# Patient Record
Sex: FEMALE | Race: WHITE | Employment: OTHER | URBAN - METROPOLITAN AREA
[De-identification: names, ages, dates, MRNs, and addresses within clinical notes are randomized per-mention and may not be internally consistent; named-entity substitution may affect disease eponyms.]

---

## 2018-10-09 ENCOUNTER — HOSPITAL ENCOUNTER (OUTPATIENT)
Dept: OPERATING ROOM | Age: 83
Setting detail: OUTPATIENT SURGERY
Discharge: HOME | End: 2018-10-09
Payer: MEDICARE

## 2018-10-09 ENCOUNTER — HOSPITAL ENCOUNTER (OUTPATIENT)
Dept: CT IMAGING | Age: 83
Discharge: HOME | End: 2018-10-09
Payer: MEDICARE

## 2018-10-09 VITALS
SYSTOLIC BLOOD PRESSURE: 195 MMHG | BODY MASS INDEX: 23.05 KG/M2 | WEIGHT: 135 LBS | DIASTOLIC BLOOD PRESSURE: 84 MMHG | HEIGHT: 64 IN | RESPIRATION RATE: 16 BRPM | HEART RATE: 60 BPM | OXYGEN SATURATION: 100 %

## 2018-10-09 DIAGNOSIS — F06.30 MOOD DISORDER DUE TO KNOWN PHYSIOLOGICAL CONDITION: ICD-10-CM

## 2018-10-09 PROCEDURE — 66821 AFTER CATARACT LASER SURGERY: CPT

## 2018-10-09 PROCEDURE — 70491 CT SOFT TISSUE NECK W/DYE: CPT

## 2018-10-09 PROCEDURE — 6360000004 HC RX CONTRAST MEDICATION: Performed by: FAMILY MEDICINE

## 2018-10-09 PROCEDURE — 70460 CT HEAD/BRAIN W/DYE: CPT

## 2018-10-09 PROCEDURE — 6370000000 HC RX 637 (ALT 250 FOR IP): Performed by: OPHTHALMOLOGY

## 2018-10-09 RX ORDER — ARIPIPRAZOLE 5 MG/1
TABLET ORAL
Status: ON HOLD | COMMUNITY
Start: 2018-07-25 | End: 2019-01-12 | Stop reason: HOSPADM

## 2018-10-09 RX ORDER — FERROUS SULFATE 325(65) MG
325 TABLET ORAL
COMMUNITY

## 2018-10-09 RX ORDER — PROPARACAINE HYDROCHLORIDE 5 MG/ML
1 SOLUTION/ DROPS OPHTHALMIC
Status: ACTIVE | OUTPATIENT
Start: 2018-10-09 | End: 2018-10-09

## 2018-10-09 RX ORDER — TROPICAMIDE 10 MG/ML
1 SOLUTION/ DROPS OPHTHALMIC
Status: COMPLETED | OUTPATIENT
Start: 2018-10-09 | End: 2018-10-09

## 2018-10-09 RX ORDER — DOCUSATE SODIUM 100 MG/1
100 CAPSULE, LIQUID FILLED ORAL DAILY
COMMUNITY

## 2018-10-09 RX ORDER — DIVALPROEX SODIUM 125 MG/1
125 CAPSULE, COATED PELLETS ORAL DAILY
Status: ON HOLD | COMMUNITY
End: 2019-01-12 | Stop reason: HOSPADM

## 2018-10-09 RX ORDER — CYANOCOBALAMIN (VITAMIN B-12) 1000 MCG
1 TABLET, EXTENDED RELEASE ORAL
COMMUNITY

## 2018-10-09 RX ORDER — POLYETHYLENE GLYCOL 3350 17 G
2 POWDER IN PACKET (EA) ORAL PRN
COMMUNITY

## 2018-10-09 RX ORDER — DESVENLAFAXINE 100 MG/1
TABLET, EXTENDED RELEASE ORAL EVERY OTHER DAY
COMMUNITY

## 2018-10-09 RX ORDER — LACTULOSE 10 G/15ML
20 SOLUTION ORAL DAILY
COMMUNITY

## 2018-10-09 RX ORDER — LANOLIN ALCOHOL/MO/W.PET/CERES
3 CREAM (GRAM) TOPICAL DAILY
COMMUNITY

## 2018-10-09 RX ORDER — FLUTICASONE PROPIONATE 50 MCG
1 SPRAY, SUSPENSION (ML) NASAL DAILY PRN
COMMUNITY

## 2018-10-09 RX ORDER — ROSUVASTATIN CALCIUM 5 MG/1
5 TABLET, COATED ORAL NIGHTLY
COMMUNITY
End: 2019-07-09

## 2018-10-09 RX ORDER — POLYETHYLENE GLYCOL 3350 17 G/17G
17 POWDER, FOR SOLUTION ORAL DAILY PRN
COMMUNITY

## 2018-10-09 RX ORDER — IBUPROFEN 400 MG/1
400 TABLET ORAL EVERY 4 HOURS PRN
Status: ON HOLD | COMMUNITY
End: 2019-09-16 | Stop reason: HOSPADM

## 2018-10-09 RX ADMIN — TROPICAMIDE 1 DROP: 10 SOLUTION/ DROPS OPHTHALMIC at 06:32

## 2018-10-09 RX ADMIN — IOPAMIDOL 80 ML: 755 INJECTION, SOLUTION INTRAVENOUS at 16:03

## 2018-11-30 ENCOUNTER — HOSPITAL ENCOUNTER (EMERGENCY)
Age: 83
Discharge: HOME | End: 2018-11-30
Attending: EMERGENCY MEDICINE
Payer: MEDICARE

## 2018-11-30 VITALS
HEIGHT: 64 IN | BODY MASS INDEX: 23.05 KG/M2 | RESPIRATION RATE: 21 BRPM | TEMPERATURE: 99.4 F | HEART RATE: 63 BPM | WEIGHT: 135 LBS | OXYGEN SATURATION: 97 % | DIASTOLIC BLOOD PRESSURE: 71 MMHG | SYSTOLIC BLOOD PRESSURE: 189 MMHG

## 2018-11-30 DIAGNOSIS — I10 HYPERTENSION, UNSPECIFIED TYPE: Primary | ICD-10-CM

## 2018-11-30 LAB
A/G RATIO: 1.2 (ref 1.1–2.2)
ALBUMIN SERPL-MCNC: 3.6 G/DL (ref 3.4–5)
ALP BLD-CCNC: 57 U/L (ref 40–129)
ALT SERPL-CCNC: 9 U/L (ref 10–40)
ANION GAP SERPL CALCULATED.3IONS-SCNC: 10 MMOL/L (ref 3–16)
AST SERPL-CCNC: 13 U/L (ref 15–37)
BACTERIA: ABNORMAL /HPF
BASOPHILS ABSOLUTE: 0 K/UL (ref 0–0.2)
BASOPHILS RELATIVE PERCENT: 0.3 %
BILIRUB SERPL-MCNC: <0.2 MG/DL (ref 0–1)
BILIRUBIN URINE: NEGATIVE
BLOOD, URINE: ABNORMAL
BUN BLDV-MCNC: 21 MG/DL (ref 7–20)
CALCIUM SERPL-MCNC: 9.2 MG/DL (ref 8.3–10.6)
CHLORIDE BLD-SCNC: 100 MMOL/L (ref 99–110)
CLARITY: CLEAR
CO2: 27 MMOL/L (ref 21–32)
COLOR: YELLOW
CREAT SERPL-MCNC: 0.7 MG/DL (ref 0.6–1.2)
EOSINOPHILS ABSOLUTE: 0.2 K/UL (ref 0–0.6)
EOSINOPHILS RELATIVE PERCENT: 2 %
EPITHELIAL CELLS, UA: ABNORMAL /HPF
GFR AFRICAN AMERICAN: >60
GFR NON-AFRICAN AMERICAN: >60
GLOBULIN: 3 G/DL
GLUCOSE BLD-MCNC: 136 MG/DL (ref 70–99)
GLUCOSE BLD-MCNC: 142 MG/DL (ref 70–99)
GLUCOSE URINE: NEGATIVE MG/DL
HCT VFR BLD CALC: 36.1 % (ref 36–48)
HEMOGLOBIN: 12 G/DL (ref 12–16)
KETONES, URINE: NEGATIVE MG/DL
LEUKOCYTE ESTERASE, URINE: ABNORMAL
LYMPHOCYTES ABSOLUTE: 2 K/UL (ref 1–5.1)
LYMPHOCYTES RELATIVE PERCENT: 23.7 %
MCH RBC QN AUTO: 32.5 PG (ref 26–34)
MCHC RBC AUTO-ENTMCNC: 33.3 G/DL (ref 31–36)
MCV RBC AUTO: 97.4 FL (ref 80–100)
MICROSCOPIC EXAMINATION: YES
MONOCYTES ABSOLUTE: 0.6 K/UL (ref 0–1.3)
MONOCYTES RELATIVE PERCENT: 7.7 %
MUCUS: ABNORMAL /LPF
NEUTROPHILS ABSOLUTE: 5.5 K/UL (ref 1.7–7.7)
NEUTROPHILS RELATIVE PERCENT: 66.3 %
NITRITE, URINE: NEGATIVE
PDW BLD-RTO: 13 % (ref 12.4–15.4)
PERFORMED ON: ABNORMAL
PH UA: 6
PLATELET # BLD: 217 K/UL (ref 135–450)
PMV BLD AUTO: 9.1 FL (ref 5–10.5)
POTASSIUM SERPL-SCNC: 4.3 MMOL/L (ref 3.5–5.1)
PROTEIN UA: NEGATIVE MG/DL
RBC # BLD: 3.7 M/UL (ref 4–5.2)
RBC UA: ABNORMAL /HPF (ref 0–2)
RENAL EPITHELIAL, UA: ABNORMAL /HPF
SODIUM BLD-SCNC: 137 MMOL/L (ref 136–145)
SPECIFIC GRAVITY UA: 1.01
TOTAL PROTEIN: 6.6 G/DL (ref 6.4–8.2)
TROPONIN: <0.01 NG/ML
URINE TYPE: ABNORMAL
UROBILINOGEN, URINE: 0.2 E.U./DL
WBC # BLD: 8.4 K/UL (ref 4–11)
WBC UA: ABNORMAL /HPF (ref 0–5)

## 2018-11-30 PROCEDURE — 81001 URINALYSIS AUTO W/SCOPE: CPT

## 2018-11-30 PROCEDURE — 99285 EMERGENCY DEPT VISIT HI MDM: CPT

## 2018-11-30 PROCEDURE — 93005 ELECTROCARDIOGRAM TRACING: CPT | Performed by: EMERGENCY MEDICINE

## 2018-11-30 PROCEDURE — 85025 COMPLETE CBC W/AUTO DIFF WBC: CPT

## 2018-11-30 PROCEDURE — 84484 ASSAY OF TROPONIN QUANT: CPT

## 2018-11-30 PROCEDURE — 80053 COMPREHEN METABOLIC PANEL: CPT

## 2018-12-01 LAB
EKG ATRIAL RATE: 63 BPM
EKG DIAGNOSIS: NORMAL
EKG P AXIS: 84 DEGREES
EKG P-R INTERVAL: 174 MS
EKG Q-T INTERVAL: 420 MS
EKG QRS DURATION: 78 MS
EKG QTC CALCULATION (BAZETT): 429 MS
EKG R AXIS: 22 DEGREES
EKG T AXIS: 54 DEGREES
EKG VENTRICULAR RATE: 63 BPM

## 2018-12-01 PROCEDURE — 93010 ELECTROCARDIOGRAM REPORT: CPT | Performed by: INTERNAL MEDICINE

## 2019-01-08 ENCOUNTER — APPOINTMENT (OUTPATIENT)
Dept: GENERAL RADIOLOGY | Age: 84
DRG: 871 | End: 2019-01-08
Payer: MEDICARE

## 2019-01-08 ENCOUNTER — HOSPITAL ENCOUNTER (INPATIENT)
Age: 84
LOS: 4 days | Discharge: NURSING, REHAB OR LONG-TERM CARE | DRG: 871 | End: 2019-01-12
Attending: EMERGENCY MEDICINE | Admitting: INTERNAL MEDICINE
Payer: MEDICARE

## 2019-01-08 DIAGNOSIS — R06.02 SHORTNESS OF BREATH: Primary | ICD-10-CM

## 2019-01-08 DIAGNOSIS — R09.02 HYPOXIA: ICD-10-CM

## 2019-01-08 DIAGNOSIS — J18.9 PNEUMONIA DUE TO ORGANISM: ICD-10-CM

## 2019-01-08 PROBLEM — R65.20 SEVERE SEPSIS (HCC): Status: ACTIVE | Noted: 2019-01-08

## 2019-01-08 PROBLEM — F32.A DEPRESSION: Status: ACTIVE | Noted: 2019-01-08

## 2019-01-08 PROBLEM — A41.9 SEVERE SEPSIS (HCC): Status: ACTIVE | Noted: 2019-01-08

## 2019-01-08 PROBLEM — E11.9 DMII (DIABETES MELLITUS, TYPE 2) (HCC): Status: ACTIVE | Noted: 2019-01-08

## 2019-01-08 LAB
A/G RATIO: 1.1 (ref 1.1–2.2)
ALBUMIN SERPL-MCNC: 3.9 G/DL (ref 3.4–5)
ALP BLD-CCNC: 63 U/L (ref 40–129)
ALT SERPL-CCNC: 9 U/L (ref 10–40)
ANION GAP SERPL CALCULATED.3IONS-SCNC: 19 MMOL/L (ref 3–16)
APTT: 27.6 SEC (ref 26–36)
AST SERPL-CCNC: 17 U/L (ref 15–37)
BASE EXCESS VENOUS: -1.9 MMOL/L (ref -3–3)
BASOPHILS ABSOLUTE: 0 K/UL (ref 0–0.2)
BASOPHILS RELATIVE PERCENT: 0.3 %
BILIRUB SERPL-MCNC: <0.2 MG/DL (ref 0–1)
BUN BLDV-MCNC: 34 MG/DL (ref 7–20)
CALCIUM SERPL-MCNC: 9.4 MG/DL (ref 8.3–10.6)
CARBOXYHEMOGLOBIN: 1.1 % (ref 0–1.5)
CHLORIDE BLD-SCNC: 93 MMOL/L (ref 99–110)
CO2: 24 MMOL/L (ref 21–32)
CREAT SERPL-MCNC: 1.2 MG/DL (ref 0.6–1.2)
EKG ATRIAL RATE: 87 BPM
EKG DIAGNOSIS: NORMAL
EKG Q-T INTERVAL: 382 MS
EKG QRS DURATION: 76 MS
EKG QTC CALCULATION (BAZETT): 462 MS
EKG R AXIS: 12 DEGREES
EKG T AXIS: 56 DEGREES
EKG VENTRICULAR RATE: 88 BPM
EOSINOPHILS ABSOLUTE: 0 K/UL (ref 0–0.6)
EOSINOPHILS RELATIVE PERCENT: 0 %
GFR AFRICAN AMERICAN: 51
GFR NON-AFRICAN AMERICAN: 42
GLOBULIN: 3.6 G/DL
GLUCOSE BLD-MCNC: 181 MG/DL (ref 70–99)
GLUCOSE BLD-MCNC: 207 MG/DL (ref 70–99)
GLUCOSE BLD-MCNC: 213 MG/DL (ref 70–99)
HCO3 VENOUS: 24.2 MMOL/L (ref 23–29)
HCT VFR BLD CALC: 35.3 % (ref 36–48)
HEMOGLOBIN: 11.9 G/DL (ref 12–16)
INR BLD: 1.04 (ref 0.86–1.14)
LACTIC ACID: 2.8 MMOL/L (ref 0.4–2)
LACTIC ACID: 4.6 MMOL/L (ref 0.4–2)
LYMPHOCYTES ABSOLUTE: 0.4 K/UL (ref 1–5.1)
LYMPHOCYTES RELATIVE PERCENT: 3.6 %
MCH RBC QN AUTO: 32.7 PG (ref 26–34)
MCHC RBC AUTO-ENTMCNC: 33.6 G/DL (ref 31–36)
MCV RBC AUTO: 97.4 FL (ref 80–100)
METHEMOGLOBIN VENOUS: 0.6 %
MONOCYTES ABSOLUTE: 0.4 K/UL (ref 0–1.3)
MONOCYTES RELATIVE PERCENT: 3.6 %
NEUTROPHILS ABSOLUTE: 10.8 K/UL (ref 1.7–7.7)
NEUTROPHILS RELATIVE PERCENT: 92.5 %
O2 CONTENT, VEN: 13 VOL %
O2 SAT, VEN: 72 %
O2 THERAPY: ABNORMAL
PCO2, VEN: 46.7 MMHG (ref 40–50)
PDW BLD-RTO: 13 % (ref 12.4–15.4)
PERFORMED ON: ABNORMAL
PERFORMED ON: ABNORMAL
PH VENOUS: 7.33 (ref 7.35–7.45)
PLATELET # BLD: 240 K/UL (ref 135–450)
PMV BLD AUTO: 8.8 FL (ref 5–10.5)
PO2, VEN: 41.8 MMHG (ref 25–40)
POTASSIUM SERPL-SCNC: 4.4 MMOL/L (ref 3.5–5.1)
PROTHROMBIN TIME: 11.8 SEC (ref 9.8–13)
RAPID INFLUENZA  B AGN: NEGATIVE
RAPID INFLUENZA A AGN: NEGATIVE
RBC # BLD: 3.63 M/UL (ref 4–5.2)
SODIUM BLD-SCNC: 136 MMOL/L (ref 136–145)
TCO2 CALC VENOUS: 26 MMOL/L
TOTAL PROTEIN: 7.5 G/DL (ref 6.4–8.2)
WBC # BLD: 11.7 K/UL (ref 4–11)

## 2019-01-08 PROCEDURE — 6370000000 HC RX 637 (ALT 250 FOR IP): Performed by: INTERNAL MEDICINE

## 2019-01-08 PROCEDURE — 36415 COLL VENOUS BLD VENIPUNCTURE: CPT

## 2019-01-08 PROCEDURE — 71045 X-RAY EXAM CHEST 1 VIEW: CPT

## 2019-01-08 PROCEDURE — 99285 EMERGENCY DEPT VISIT HI MDM: CPT

## 2019-01-08 PROCEDURE — 6370000000 HC RX 637 (ALT 250 FOR IP): Performed by: EMERGENCY MEDICINE

## 2019-01-08 PROCEDURE — 87040 BLOOD CULTURE FOR BACTERIA: CPT

## 2019-01-08 PROCEDURE — 83605 ASSAY OF LACTIC ACID: CPT

## 2019-01-08 PROCEDURE — 80053 COMPREHEN METABOLIC PANEL: CPT

## 2019-01-08 PROCEDURE — 96368 THER/DIAG CONCURRENT INF: CPT

## 2019-01-08 PROCEDURE — 2580000003 HC RX 258: Performed by: EMERGENCY MEDICINE

## 2019-01-08 PROCEDURE — 87186 SC STD MICRODIL/AGAR DIL: CPT

## 2019-01-08 PROCEDURE — 94664 DEMO&/EVAL PT USE INHALER: CPT

## 2019-01-08 PROCEDURE — 87077 CULTURE AEROBIC IDENTIFY: CPT

## 2019-01-08 PROCEDURE — 87804 INFLUENZA ASSAY W/OPTIC: CPT

## 2019-01-08 PROCEDURE — 82803 BLOOD GASES ANY COMBINATION: CPT

## 2019-01-08 PROCEDURE — 6360000002 HC RX W HCPCS: Performed by: EMERGENCY MEDICINE

## 2019-01-08 PROCEDURE — 2700000000 HC OXYGEN THERAPY PER DAY

## 2019-01-08 PROCEDURE — 94761 N-INVAS EAR/PLS OXIMETRY MLT: CPT

## 2019-01-08 PROCEDURE — 1200000000 HC SEMI PRIVATE

## 2019-01-08 PROCEDURE — 6360000002 HC RX W HCPCS: Performed by: INTERNAL MEDICINE

## 2019-01-08 PROCEDURE — 2580000003 HC RX 258: Performed by: INTERNAL MEDICINE

## 2019-01-08 PROCEDURE — 96365 THER/PROPH/DIAG IV INF INIT: CPT

## 2019-01-08 PROCEDURE — 85730 THROMBOPLASTIN TIME PARTIAL: CPT

## 2019-01-08 PROCEDURE — 85610 PROTHROMBIN TIME: CPT

## 2019-01-08 PROCEDURE — 93005 ELECTROCARDIOGRAM TRACING: CPT | Performed by: EMERGENCY MEDICINE

## 2019-01-08 PROCEDURE — 93010 ELECTROCARDIOGRAM REPORT: CPT | Performed by: INTERNAL MEDICINE

## 2019-01-08 PROCEDURE — 94640 AIRWAY INHALATION TREATMENT: CPT

## 2019-01-08 PROCEDURE — 87086 URINE CULTURE/COLONY COUNT: CPT

## 2019-01-08 PROCEDURE — 96366 THER/PROPH/DIAG IV INF ADDON: CPT

## 2019-01-08 PROCEDURE — 85025 COMPLETE CBC W/AUTO DIFF WBC: CPT

## 2019-01-08 RX ORDER — DESVENLAFAXINE 50 MG/1
100 TABLET, EXTENDED RELEASE ORAL EVERY OTHER DAY
Status: DISCONTINUED | OUTPATIENT
Start: 2019-01-09 | End: 2019-01-12 | Stop reason: HOSPADM

## 2019-01-08 RX ORDER — FERROUS SULFATE 325(65) MG
325 TABLET ORAL
Status: DISCONTINUED | OUTPATIENT
Start: 2019-01-09 | End: 2019-01-12 | Stop reason: HOSPADM

## 2019-01-08 RX ORDER — POLYETHYLENE GLYCOL 3350 17 G/17G
17 POWDER, FOR SOLUTION ORAL DAILY PRN
Status: DISCONTINUED | OUTPATIENT
Start: 2019-01-08 | End: 2019-01-12 | Stop reason: HOSPADM

## 2019-01-08 RX ORDER — ASPIRIN 81 MG/1
81 TABLET, CHEWABLE ORAL DAILY
Status: DISCONTINUED | OUTPATIENT
Start: 2019-01-09 | End: 2019-01-12 | Stop reason: HOSPADM

## 2019-01-08 RX ORDER — SODIUM CHLORIDE 9 MG/ML
INJECTION, SOLUTION INTRAVENOUS CONTINUOUS
Status: DISCONTINUED | OUTPATIENT
Start: 2019-01-08 | End: 2019-01-10

## 2019-01-08 RX ORDER — UREA 10 %
3 LOTION (ML) TOPICAL NIGHTLY
Status: DISCONTINUED | OUTPATIENT
Start: 2019-01-08 | End: 2019-01-12 | Stop reason: HOSPADM

## 2019-01-08 RX ORDER — IPRATROPIUM BROMIDE AND ALBUTEROL SULFATE 2.5; .5 MG/3ML; MG/3ML
1 SOLUTION RESPIRATORY (INHALATION) ONCE
Status: COMPLETED | OUTPATIENT
Start: 2019-01-08 | End: 2019-01-08

## 2019-01-08 RX ORDER — SODIUM CHLORIDE 0.9 % (FLUSH) 0.9 %
10 SYRINGE (ML) INJECTION PRN
Status: DISCONTINUED | OUTPATIENT
Start: 2019-01-08 | End: 2019-01-12 | Stop reason: HOSPADM

## 2019-01-08 RX ORDER — POLYETHYLENE GLYCOL 3350 17 G/17G
17 POWDER, FOR SOLUTION ORAL DAILY PRN
Status: DISCONTINUED | OUTPATIENT
Start: 2019-01-08 | End: 2019-01-08 | Stop reason: CLARIF

## 2019-01-08 RX ORDER — LACTULOSE 10 G/15ML
20 SOLUTION ORAL DAILY
Status: DISCONTINUED | OUTPATIENT
Start: 2019-01-08 | End: 2019-01-12 | Stop reason: HOSPADM

## 2019-01-08 RX ORDER — DOCUSATE SODIUM 100 MG/1
100 CAPSULE, LIQUID FILLED ORAL DAILY
Status: DISCONTINUED | OUTPATIENT
Start: 2019-01-09 | End: 2019-01-12 | Stop reason: HOSPADM

## 2019-01-08 RX ORDER — MAGNESIUM SULFATE IN WATER 40 MG/ML
2 INJECTION, SOLUTION INTRAVENOUS ONCE
Status: COMPLETED | OUTPATIENT
Start: 2019-01-08 | End: 2019-01-08

## 2019-01-08 RX ORDER — SODIUM CHLORIDE 0.9 % (FLUSH) 0.9 %
10 SYRINGE (ML) INJECTION EVERY 12 HOURS SCHEDULED
Status: DISCONTINUED | OUTPATIENT
Start: 2019-01-08 | End: 2019-01-12 | Stop reason: HOSPADM

## 2019-01-08 RX ORDER — 0.9 % SODIUM CHLORIDE 0.9 %
1000 INTRAVENOUS SOLUTION INTRAVENOUS ONCE
Status: COMPLETED | OUTPATIENT
Start: 2019-01-08 | End: 2019-01-08

## 2019-01-08 RX ORDER — IPRATROPIUM BROMIDE AND ALBUTEROL SULFATE 2.5; .5 MG/3ML; MG/3ML
1 SOLUTION RESPIRATORY (INHALATION)
Status: DISCONTINUED | OUTPATIENT
Start: 2019-01-08 | End: 2019-01-10

## 2019-01-08 RX ORDER — ONDANSETRON 2 MG/ML
4 INJECTION INTRAMUSCULAR; INTRAVENOUS EVERY 6 HOURS PRN
Status: DISCONTINUED | OUTPATIENT
Start: 2019-01-08 | End: 2019-01-12 | Stop reason: HOSPADM

## 2019-01-08 RX ORDER — NICOTINE POLACRILEX 4 MG
15 LOZENGE BUCCAL PRN
Status: DISCONTINUED | OUTPATIENT
Start: 2019-01-08 | End: 2019-01-12 | Stop reason: HOSPADM

## 2019-01-08 RX ORDER — ROSUVASTATIN CALCIUM 10 MG/1
5 TABLET, COATED ORAL NIGHTLY
Status: DISCONTINUED | OUTPATIENT
Start: 2019-01-09 | End: 2019-01-12 | Stop reason: HOSPADM

## 2019-01-08 RX ORDER — DEXTROSE MONOHYDRATE 25 G/50ML
12.5 INJECTION, SOLUTION INTRAVENOUS PRN
Status: DISCONTINUED | OUTPATIENT
Start: 2019-01-08 | End: 2019-01-12 | Stop reason: HOSPADM

## 2019-01-08 RX ORDER — PREDNISONE 20 MG/1
60 TABLET ORAL ONCE
Status: COMPLETED | OUTPATIENT
Start: 2019-01-08 | End: 2019-01-08

## 2019-01-08 RX ORDER — DEXTROSE MONOHYDRATE 50 MG/ML
100 INJECTION, SOLUTION INTRAVENOUS PRN
Status: DISCONTINUED | OUTPATIENT
Start: 2019-01-08 | End: 2019-01-12 | Stop reason: HOSPADM

## 2019-01-08 RX ADMIN — Medication 10 ML: at 20:57

## 2019-01-08 RX ADMIN — IPRATROPIUM BROMIDE AND ALBUTEROL SULFATE 1 AMPULE: .5; 3 SOLUTION RESPIRATORY (INHALATION) at 16:58

## 2019-01-08 RX ADMIN — INSULIN LISPRO 1 UNITS: 100 INJECTION, SOLUTION INTRAVENOUS; SUBCUTANEOUS at 20:45

## 2019-01-08 RX ADMIN — INSULIN LISPRO 2 UNITS: 100 INJECTION, SOLUTION INTRAVENOUS; SUBCUTANEOUS at 18:06

## 2019-01-08 RX ADMIN — Medication 3 MG: at 20:57

## 2019-01-08 RX ADMIN — AZITHROMYCIN MONOHYDRATE 500 MG: 500 INJECTION, POWDER, LYOPHILIZED, FOR SOLUTION INTRAVENOUS at 20:10

## 2019-01-08 RX ADMIN — LACTULOSE 20 G: 20 SOLUTION ORAL at 18:01

## 2019-01-08 RX ADMIN — SODIUM CHLORIDE: 9 INJECTION, SOLUTION INTRAVENOUS at 18:01

## 2019-01-08 RX ADMIN — CEFTRIAXONE SODIUM 1 G: 1 INJECTION, POWDER, FOR SOLUTION INTRAMUSCULAR; INTRAVENOUS at 14:55

## 2019-01-08 RX ADMIN — PREDNISONE 60 MG: 20 TABLET ORAL at 13:34

## 2019-01-08 RX ADMIN — SODIUM CHLORIDE 1000 ML: 9 INJECTION, SOLUTION INTRAVENOUS at 13:34

## 2019-01-08 RX ADMIN — IPRATROPIUM BROMIDE AND ALBUTEROL SULFATE 1 AMPULE: .5; 3 SOLUTION RESPIRATORY (INHALATION) at 12:29

## 2019-01-08 RX ADMIN — MAGNESIUM SULFATE HEPTAHYDRATE 2 G: 40 INJECTION, SOLUTION INTRAVENOUS at 13:34

## 2019-01-08 RX ADMIN — IPRATROPIUM BROMIDE AND ALBUTEROL SULFATE 1 AMPULE: .5; 3 SOLUTION RESPIRATORY (INHALATION) at 19:45

## 2019-01-08 ASSESSMENT — PAIN SCALES - GENERAL
PAINLEVEL_OUTOF10: 0

## 2019-01-09 PROBLEM — J96.01 ACUTE RESPIRATORY FAILURE WITH HYPOXIA (HCC): Status: ACTIVE | Noted: 2019-01-09

## 2019-01-09 LAB
ANION GAP SERPL CALCULATED.3IONS-SCNC: 11 MMOL/L (ref 3–16)
BUN BLDV-MCNC: 21 MG/DL (ref 7–20)
CALCIUM SERPL-MCNC: 8.6 MG/DL (ref 8.3–10.6)
CHLORIDE BLD-SCNC: 106 MMOL/L (ref 99–110)
CO2: 23 MMOL/L (ref 21–32)
CREAT SERPL-MCNC: 0.6 MG/DL (ref 0.6–1.2)
GFR AFRICAN AMERICAN: >60
GFR NON-AFRICAN AMERICAN: >60
GLUCOSE BLD-MCNC: 107 MG/DL (ref 70–99)
GLUCOSE BLD-MCNC: 130 MG/DL (ref 70–99)
GLUCOSE BLD-MCNC: 135 MG/DL (ref 70–99)
GLUCOSE BLD-MCNC: 152 MG/DL (ref 70–99)
GLUCOSE BLD-MCNC: 95 MG/DL (ref 70–99)
HCT VFR BLD CALC: 32.7 % (ref 36–48)
HEMOGLOBIN: 11 G/DL (ref 12–16)
LACTIC ACID: 2.5 MMOL/L (ref 0.4–2)
MCH RBC QN AUTO: 32.8 PG (ref 26–34)
MCHC RBC AUTO-ENTMCNC: 33.6 G/DL (ref 31–36)
MCV RBC AUTO: 97.7 FL (ref 80–100)
PDW BLD-RTO: 13.2 % (ref 12.4–15.4)
PERFORMED ON: ABNORMAL
PLATELET # BLD: 246 K/UL (ref 135–450)
PMV BLD AUTO: 8.8 FL (ref 5–10.5)
POTASSIUM REFLEX MAGNESIUM: 4 MMOL/L (ref 3.5–5.1)
RBC # BLD: 3.35 M/UL (ref 4–5.2)
SODIUM BLD-SCNC: 140 MMOL/L (ref 136–145)
WBC # BLD: 8.1 K/UL (ref 4–11)

## 2019-01-09 PROCEDURE — 6370000000 HC RX 637 (ALT 250 FOR IP): Performed by: INTERNAL MEDICINE

## 2019-01-09 PROCEDURE — 6360000002 HC RX W HCPCS: Performed by: INTERNAL MEDICINE

## 2019-01-09 PROCEDURE — 36415 COLL VENOUS BLD VENIPUNCTURE: CPT

## 2019-01-09 PROCEDURE — 2580000003 HC RX 258: Performed by: INTERNAL MEDICINE

## 2019-01-09 PROCEDURE — 97116 GAIT TRAINING THERAPY: CPT

## 2019-01-09 PROCEDURE — 94761 N-INVAS EAR/PLS OXIMETRY MLT: CPT

## 2019-01-09 PROCEDURE — 2700000000 HC OXYGEN THERAPY PER DAY

## 2019-01-09 PROCEDURE — 97162 PT EVAL MOD COMPLEX 30 MIN: CPT

## 2019-01-09 PROCEDURE — 85027 COMPLETE CBC AUTOMATED: CPT

## 2019-01-09 PROCEDURE — 80048 BASIC METABOLIC PNL TOTAL CA: CPT

## 2019-01-09 PROCEDURE — 97166 OT EVAL MOD COMPLEX 45 MIN: CPT

## 2019-01-09 PROCEDURE — 1200000000 HC SEMI PRIVATE

## 2019-01-09 PROCEDURE — 92610 EVALUATE SWALLOWING FUNCTION: CPT

## 2019-01-09 PROCEDURE — 83605 ASSAY OF LACTIC ACID: CPT

## 2019-01-09 PROCEDURE — 94640 AIRWAY INHALATION TREATMENT: CPT

## 2019-01-09 PROCEDURE — 97535 SELF CARE MNGMENT TRAINING: CPT

## 2019-01-09 RX ORDER — AMLODIPINE BESYLATE 5 MG/1
5 TABLET ORAL DAILY
Status: DISCONTINUED | OUTPATIENT
Start: 2019-01-09 | End: 2019-01-10

## 2019-01-09 RX ORDER — HYDRALAZINE HYDROCHLORIDE 20 MG/ML
5 INJECTION INTRAMUSCULAR; INTRAVENOUS EVERY 6 HOURS PRN
Status: DISCONTINUED | OUTPATIENT
Start: 2019-01-09 | End: 2019-01-12 | Stop reason: HOSPADM

## 2019-01-09 RX ADMIN — DESVENLAFAXINE SUCCINATE 100 MG: 50 TABLET, FILM COATED, EXTENDED RELEASE ORAL at 09:21

## 2019-01-09 RX ADMIN — IPRATROPIUM BROMIDE AND ALBUTEROL SULFATE 1 AMPULE: .5; 3 SOLUTION RESPIRATORY (INHALATION) at 19:57

## 2019-01-09 RX ADMIN — ASPIRIN 81 MG 81 MG: 81 TABLET ORAL at 09:21

## 2019-01-09 RX ADMIN — AMLODIPINE BESYLATE 5 MG: 5 TABLET ORAL at 09:21

## 2019-01-09 RX ADMIN — Medication 10 ML: at 22:21

## 2019-01-09 RX ADMIN — SODIUM CHLORIDE: 9 INJECTION, SOLUTION INTRAVENOUS at 22:21

## 2019-01-09 RX ADMIN — AZITHROMYCIN MONOHYDRATE 500 MG: 500 INJECTION, POWDER, LYOPHILIZED, FOR SOLUTION INTRAVENOUS at 16:42

## 2019-01-09 RX ADMIN — Medication 10 ML: at 09:21

## 2019-01-09 RX ADMIN — CEFTRIAXONE SODIUM 1 G: 1 INJECTION, POWDER, FOR SOLUTION INTRAMUSCULAR; INTRAVENOUS at 14:00

## 2019-01-09 RX ADMIN — FERROUS SULFATE TAB 325 MG (65 MG ELEMENTAL FE) 325 MG: 325 (65 FE) TAB at 09:21

## 2019-01-09 RX ADMIN — ROSUVASTATIN CALCIUM 5 MG: 10 TABLET, FILM COATED ORAL at 22:19

## 2019-01-09 RX ADMIN — Medication 3 MG: at 22:19

## 2019-01-09 RX ADMIN — IPRATROPIUM BROMIDE AND ALBUTEROL SULFATE 1 AMPULE: .5; 3 SOLUTION RESPIRATORY (INHALATION) at 15:50

## 2019-01-09 RX ADMIN — ENOXAPARIN SODIUM 40 MG: 40 INJECTION SUBCUTANEOUS at 09:21

## 2019-01-09 RX ADMIN — LACTULOSE 20 G: 20 SOLUTION ORAL at 09:34

## 2019-01-09 RX ADMIN — HYDRALAZINE HYDROCHLORIDE 5 MG: 20 INJECTION INTRAMUSCULAR; INTRAVENOUS at 09:21

## 2019-01-09 RX ADMIN — INSULIN LISPRO 1 UNITS: 100 INJECTION, SOLUTION INTRAVENOUS; SUBCUTANEOUS at 14:06

## 2019-01-09 RX ADMIN — IPRATROPIUM BROMIDE AND ALBUTEROL SULFATE 1 AMPULE: .5; 3 SOLUTION RESPIRATORY (INHALATION) at 11:36

## 2019-01-09 RX ADMIN — IPRATROPIUM BROMIDE AND ALBUTEROL SULFATE 1 AMPULE: .5; 3 SOLUTION RESPIRATORY (INHALATION) at 08:00

## 2019-01-09 RX ADMIN — DOCUSATE SODIUM 100 MG: 100 CAPSULE, LIQUID FILLED ORAL at 09:21

## 2019-01-09 ASSESSMENT — PAIN SCALES - GENERAL
PAINLEVEL_OUTOF10: 0
PAINLEVEL_OUTOF10: 0

## 2019-01-10 LAB
GLUCOSE BLD-MCNC: 132 MG/DL (ref 70–99)
GLUCOSE BLD-MCNC: 136 MG/DL (ref 70–99)
GLUCOSE BLD-MCNC: 99 MG/DL (ref 70–99)
ORGANISM: ABNORMAL
ORGANISM: ABNORMAL
PERFORMED ON: ABNORMAL
PERFORMED ON: ABNORMAL
PERFORMED ON: NORMAL
URINE CULTURE, ROUTINE: ABNORMAL

## 2019-01-10 PROCEDURE — 2580000003 HC RX 258: Performed by: INTERNAL MEDICINE

## 2019-01-10 PROCEDURE — 97110 THERAPEUTIC EXERCISES: CPT

## 2019-01-10 PROCEDURE — 6370000000 HC RX 637 (ALT 250 FOR IP): Performed by: INTERNAL MEDICINE

## 2019-01-10 PROCEDURE — 1200000000 HC SEMI PRIVATE

## 2019-01-10 PROCEDURE — 94640 AIRWAY INHALATION TREATMENT: CPT

## 2019-01-10 PROCEDURE — 6370000000 HC RX 637 (ALT 250 FOR IP): Performed by: NURSE PRACTITIONER

## 2019-01-10 PROCEDURE — 97116 GAIT TRAINING THERAPY: CPT

## 2019-01-10 PROCEDURE — 94761 N-INVAS EAR/PLS OXIMETRY MLT: CPT

## 2019-01-10 PROCEDURE — 2700000000 HC OXYGEN THERAPY PER DAY

## 2019-01-10 PROCEDURE — 6360000002 HC RX W HCPCS: Performed by: INTERNAL MEDICINE

## 2019-01-10 RX ORDER — IPRATROPIUM BROMIDE AND ALBUTEROL SULFATE 2.5; .5 MG/3ML; MG/3ML
1 SOLUTION RESPIRATORY (INHALATION) EVERY 4 HOURS
Status: DISCONTINUED | OUTPATIENT
Start: 2019-01-10 | End: 2019-01-12 | Stop reason: HOSPADM

## 2019-01-10 RX ORDER — LORAZEPAM 0.5 MG/1
0.5 TABLET ORAL ONCE
Status: COMPLETED | OUTPATIENT
Start: 2019-01-10 | End: 2019-01-10

## 2019-01-10 RX ORDER — AMLODIPINE BESYLATE 5 MG/1
10 TABLET ORAL DAILY
Status: DISCONTINUED | OUTPATIENT
Start: 2019-01-10 | End: 2019-01-12 | Stop reason: HOSPADM

## 2019-01-10 RX ADMIN — HYDRALAZINE HYDROCHLORIDE 5 MG: 20 INJECTION INTRAMUSCULAR; INTRAVENOUS at 14:03

## 2019-01-10 RX ADMIN — HYDRALAZINE HYDROCHLORIDE 5 MG: 20 INJECTION INTRAMUSCULAR; INTRAVENOUS at 04:09

## 2019-01-10 RX ADMIN — ENOXAPARIN SODIUM 40 MG: 40 INJECTION SUBCUTANEOUS at 09:06

## 2019-01-10 RX ADMIN — FERROUS SULFATE TAB 325 MG (65 MG ELEMENTAL FE) 325 MG: 325 (65 FE) TAB at 09:07

## 2019-01-10 RX ADMIN — IPRATROPIUM BROMIDE AND ALBUTEROL SULFATE 1 AMPULE: .5; 3 SOLUTION RESPIRATORY (INHALATION) at 08:10

## 2019-01-10 RX ADMIN — IPRATROPIUM BROMIDE AND ALBUTEROL SULFATE 1 AMPULE: .5; 3 SOLUTION RESPIRATORY (INHALATION) at 20:34

## 2019-01-10 RX ADMIN — SODIUM CHLORIDE, PRESERVATIVE FREE 10 ML: 5 INJECTION INTRAVENOUS at 17:57

## 2019-01-10 RX ADMIN — ASPIRIN 81 MG 81 MG: 81 TABLET ORAL at 09:07

## 2019-01-10 RX ADMIN — IPRATROPIUM BROMIDE AND ALBUTEROL SULFATE 1 AMPULE: .5; 3 SOLUTION RESPIRATORY (INHALATION) at 04:37

## 2019-01-10 RX ADMIN — DOCUSATE SODIUM 100 MG: 100 CAPSULE, LIQUID FILLED ORAL at 09:07

## 2019-01-10 RX ADMIN — AMLODIPINE BESYLATE 10 MG: 5 TABLET ORAL at 09:07

## 2019-01-10 RX ADMIN — AZITHROMYCIN MONOHYDRATE 500 MG: 500 INJECTION, POWDER, LYOPHILIZED, FOR SOLUTION INTRAVENOUS at 17:57

## 2019-01-10 RX ADMIN — LACTULOSE 20 G: 20 SOLUTION ORAL at 09:07

## 2019-01-10 RX ADMIN — IPRATROPIUM BROMIDE AND ALBUTEROL SULFATE 1 AMPULE: .5; 3 SOLUTION RESPIRATORY (INHALATION) at 12:20

## 2019-01-10 RX ADMIN — CEFTRIAXONE SODIUM 1 G: 1 INJECTION, POWDER, FOR SOLUTION INTRAMUSCULAR; INTRAVENOUS at 14:02

## 2019-01-10 RX ADMIN — LORAZEPAM 0.5 MG: 0.5 TABLET ORAL at 19:35

## 2019-01-10 RX ADMIN — IPRATROPIUM BROMIDE AND ALBUTEROL SULFATE 1 AMPULE: .5; 3 SOLUTION RESPIRATORY (INHALATION) at 16:18

## 2019-01-10 ASSESSMENT — PAIN SCALES - GENERAL
PAINLEVEL_OUTOF10: 0

## 2019-01-11 LAB
ANION GAP SERPL CALCULATED.3IONS-SCNC: 12 MMOL/L (ref 3–16)
BUN BLDV-MCNC: 12 MG/DL (ref 7–20)
CALCIUM SERPL-MCNC: 8.6 MG/DL (ref 8.3–10.6)
CHLORIDE BLD-SCNC: 99 MMOL/L (ref 99–110)
CO2: 26 MMOL/L (ref 21–32)
CREAT SERPL-MCNC: 0.6 MG/DL (ref 0.6–1.2)
GFR AFRICAN AMERICAN: >60
GFR NON-AFRICAN AMERICAN: >60
GLUCOSE BLD-MCNC: 114 MG/DL (ref 70–99)
GLUCOSE BLD-MCNC: 95 MG/DL (ref 70–99)
HCT VFR BLD CALC: 33.5 % (ref 36–48)
HEMOGLOBIN: 11.2 G/DL (ref 12–16)
MCH RBC QN AUTO: 32.3 PG (ref 26–34)
MCHC RBC AUTO-ENTMCNC: 33.3 G/DL (ref 31–36)
MCV RBC AUTO: 96.9 FL (ref 80–100)
PDW BLD-RTO: 12.8 % (ref 12.4–15.4)
PERFORMED ON: NORMAL
PLATELET # BLD: 269 K/UL (ref 135–450)
PMV BLD AUTO: 8.4 FL (ref 5–10.5)
POTASSIUM SERPL-SCNC: 3.9 MMOL/L (ref 3.5–5.1)
RBC # BLD: 3.46 M/UL (ref 4–5.2)
SODIUM BLD-SCNC: 137 MMOL/L (ref 136–145)
WBC # BLD: 9.4 K/UL (ref 4–11)

## 2019-01-11 PROCEDURE — 94761 N-INVAS EAR/PLS OXIMETRY MLT: CPT

## 2019-01-11 PROCEDURE — 36415 COLL VENOUS BLD VENIPUNCTURE: CPT

## 2019-01-11 PROCEDURE — 2700000000 HC OXYGEN THERAPY PER DAY

## 2019-01-11 PROCEDURE — 80048 BASIC METABOLIC PNL TOTAL CA: CPT

## 2019-01-11 PROCEDURE — 6370000000 HC RX 637 (ALT 250 FOR IP): Performed by: INTERNAL MEDICINE

## 2019-01-11 PROCEDURE — 85027 COMPLETE CBC AUTOMATED: CPT

## 2019-01-11 PROCEDURE — 97530 THERAPEUTIC ACTIVITIES: CPT

## 2019-01-11 PROCEDURE — 94640 AIRWAY INHALATION TREATMENT: CPT

## 2019-01-11 PROCEDURE — 2580000003 HC RX 258: Performed by: INTERNAL MEDICINE

## 2019-01-11 PROCEDURE — 1200000000 HC SEMI PRIVATE

## 2019-01-11 PROCEDURE — 6360000002 HC RX W HCPCS: Performed by: INTERNAL MEDICINE

## 2019-01-11 RX ORDER — HYDRALAZINE HYDROCHLORIDE 25 MG/1
25 TABLET, FILM COATED ORAL EVERY 8 HOURS SCHEDULED
Status: DISCONTINUED | OUTPATIENT
Start: 2019-01-11 | End: 2019-01-12

## 2019-01-11 RX ADMIN — HYDRALAZINE HYDROCHLORIDE 5 MG: 20 INJECTION INTRAMUSCULAR; INTRAVENOUS at 00:10

## 2019-01-11 RX ADMIN — IPRATROPIUM BROMIDE AND ALBUTEROL SULFATE 1 AMPULE: .5; 3 SOLUTION RESPIRATORY (INHALATION) at 16:07

## 2019-01-11 RX ADMIN — IPRATROPIUM BROMIDE AND ALBUTEROL SULFATE 1 AMPULE: .5; 3 SOLUTION RESPIRATORY (INHALATION) at 20:01

## 2019-01-11 RX ADMIN — Medication 10 ML: at 21:27

## 2019-01-11 RX ADMIN — ENOXAPARIN SODIUM 40 MG: 40 INJECTION SUBCUTANEOUS at 13:10

## 2019-01-11 RX ADMIN — IPRATROPIUM BROMIDE AND ALBUTEROL SULFATE 1 AMPULE: .5; 3 SOLUTION RESPIRATORY (INHALATION) at 08:49

## 2019-01-11 RX ADMIN — IPRATROPIUM BROMIDE AND ALBUTEROL SULFATE 1 AMPULE: .5; 3 SOLUTION RESPIRATORY (INHALATION) at 12:30

## 2019-01-11 RX ADMIN — CEFTRIAXONE SODIUM 1 G: 1 INJECTION, POWDER, FOR SOLUTION INTRAMUSCULAR; INTRAVENOUS at 13:09

## 2019-01-11 RX ADMIN — AZITHROMYCIN MONOHYDRATE 500 MG: 500 INJECTION, POWDER, LYOPHILIZED, FOR SOLUTION INTRAVENOUS at 14:26

## 2019-01-11 RX ADMIN — HYDRALAZINE HYDROCHLORIDE 5 MG: 20 INJECTION INTRAMUSCULAR; INTRAVENOUS at 14:24

## 2019-01-11 RX ADMIN — Medication 10 ML: at 13:09

## 2019-01-11 ASSESSMENT — PAIN SCALES - GENERAL
PAINLEVEL_OUTOF10: 0

## 2019-01-11 ASSESSMENT — PAIN SCALES - WONG BAKER: WONGBAKER_NUMERICALRESPONSE: 6

## 2019-01-12 VITALS
RESPIRATION RATE: 18 BRPM | WEIGHT: 136.69 LBS | TEMPERATURE: 99 F | OXYGEN SATURATION: 91 % | SYSTOLIC BLOOD PRESSURE: 143 MMHG | HEART RATE: 89 BPM | DIASTOLIC BLOOD PRESSURE: 78 MMHG | HEIGHT: 64 IN | BODY MASS INDEX: 23.34 KG/M2

## 2019-01-12 LAB
GLUCOSE BLD-MCNC: 119 MG/DL (ref 70–99)
GLUCOSE BLD-MCNC: 97 MG/DL (ref 70–99)
PERFORMED ON: ABNORMAL
PERFORMED ON: NORMAL

## 2019-01-12 PROCEDURE — 94761 N-INVAS EAR/PLS OXIMETRY MLT: CPT

## 2019-01-12 PROCEDURE — 94640 AIRWAY INHALATION TREATMENT: CPT

## 2019-01-12 PROCEDURE — 6370000000 HC RX 637 (ALT 250 FOR IP): Performed by: INTERNAL MEDICINE

## 2019-01-12 PROCEDURE — 94664 DEMO&/EVAL PT USE INHALER: CPT

## 2019-01-12 PROCEDURE — 2700000000 HC OXYGEN THERAPY PER DAY

## 2019-01-12 PROCEDURE — 2580000003 HC RX 258

## 2019-01-12 PROCEDURE — 6360000002 HC RX W HCPCS: Performed by: INTERNAL MEDICINE

## 2019-01-12 PROCEDURE — 2580000003 HC RX 258: Performed by: INTERNAL MEDICINE

## 2019-01-12 RX ORDER — CARVEDILOL 3.12 MG/1
3.12 TABLET ORAL 2 TIMES DAILY WITH MEALS
Qty: 60 TABLET | Refills: 0
Start: 2019-01-12

## 2019-01-12 RX ORDER — IPRATROPIUM BROMIDE AND ALBUTEROL SULFATE 2.5; .5 MG/3ML; MG/3ML
3 SOLUTION RESPIRATORY (INHALATION) EVERY 4 HOURS
Qty: 360 ML | Refills: 0
Start: 2019-01-12

## 2019-01-12 RX ORDER — SODIUM CHLORIDE 9 MG/ML
INJECTION, SOLUTION INTRAVENOUS
Status: COMPLETED
Start: 2019-01-12 | End: 2019-01-12

## 2019-01-12 RX ORDER — CETIRIZINE HYDROCHLORIDE 5 MG/1
5 TABLET ORAL DAILY
Status: DISCONTINUED | OUTPATIENT
Start: 2019-01-12 | End: 2019-01-12 | Stop reason: HOSPADM

## 2019-01-12 RX ORDER — AMLODIPINE BESYLATE 10 MG/1
10 TABLET ORAL DAILY
Qty: 30 TABLET | Refills: 3 | Status: ON HOLD
Start: 2019-01-13 | End: 2019-09-16 | Stop reason: HOSPADM

## 2019-01-12 RX ORDER — CARVEDILOL 3.12 MG/1
3.12 TABLET ORAL 2 TIMES DAILY WITH MEALS
Status: DISCONTINUED | OUTPATIENT
Start: 2019-01-12 | End: 2019-01-12 | Stop reason: HOSPADM

## 2019-01-12 RX ORDER — CEFDINIR 300 MG/1
300 CAPSULE ORAL 2 TIMES DAILY
Qty: 6 CAPSULE | Refills: 0
Start: 2019-01-12 | End: 2019-01-15

## 2019-01-12 RX ADMIN — IPRATROPIUM BROMIDE AND ALBUTEROL SULFATE 1 AMPULE: .5; 3 SOLUTION RESPIRATORY (INHALATION) at 11:40

## 2019-01-12 RX ADMIN — LACTULOSE 20 G: 20 SOLUTION ORAL at 12:09

## 2019-01-12 RX ADMIN — Medication 10 ML: at 10:19

## 2019-01-12 RX ADMIN — DOCUSATE SODIUM 100 MG: 100 CAPSULE, LIQUID FILLED ORAL at 10:19

## 2019-01-12 RX ADMIN — CARVEDILOL 3.12 MG: 3.12 TABLET, FILM COATED ORAL at 10:19

## 2019-01-12 RX ADMIN — IPRATROPIUM BROMIDE AND ALBUTEROL SULFATE 1 AMPULE: .5; 3 SOLUTION RESPIRATORY (INHALATION) at 00:02

## 2019-01-12 RX ADMIN — CEFTRIAXONE SODIUM 1 G: 1 INJECTION, POWDER, FOR SOLUTION INTRAMUSCULAR; INTRAVENOUS at 12:10

## 2019-01-12 RX ADMIN — CETIRIZINE HYDROCHLORIDE 5 MG: 5 TABLET ORAL at 12:09

## 2019-01-12 RX ADMIN — IPRATROPIUM BROMIDE AND ALBUTEROL SULFATE 1 AMPULE: .5; 3 SOLUTION RESPIRATORY (INHALATION) at 07:54

## 2019-01-12 RX ADMIN — FERROUS SULFATE TAB 325 MG (65 MG ELEMENTAL FE) 325 MG: 325 (65 FE) TAB at 10:19

## 2019-01-12 RX ADMIN — ENOXAPARIN SODIUM 40 MG: 40 INJECTION SUBCUTANEOUS at 10:19

## 2019-01-12 RX ADMIN — AMLODIPINE BESYLATE 10 MG: 5 TABLET ORAL at 10:20

## 2019-01-12 RX ADMIN — ASPIRIN 81 MG 81 MG: 81 TABLET ORAL at 10:19

## 2019-01-12 RX ADMIN — SODIUM CHLORIDE 250 ML: 9 INJECTION, SOLUTION INTRAVENOUS at 12:11

## 2019-01-12 ASSESSMENT — PAIN SCALES - GENERAL
PAINLEVEL_OUTOF10: 0

## 2019-01-13 LAB
BLOOD CULTURE, ROUTINE: NORMAL
BLOOD CULTURE, ROUTINE: NORMAL

## 2019-04-23 ENCOUNTER — APPOINTMENT (OUTPATIENT)
Dept: GENERAL RADIOLOGY | Age: 84
End: 2019-04-23
Payer: MEDICARE

## 2019-04-23 ENCOUNTER — HOSPITAL ENCOUNTER (EMERGENCY)
Age: 84
Discharge: HOME | End: 2019-04-24
Attending: EMERGENCY MEDICINE
Payer: MEDICARE

## 2019-04-23 ENCOUNTER — APPOINTMENT (OUTPATIENT)
Dept: CT IMAGING | Age: 84
End: 2019-04-23
Payer: MEDICARE

## 2019-04-23 VITALS
BODY MASS INDEX: 21.63 KG/M2 | HEART RATE: 59 BPM | SYSTOLIC BLOOD PRESSURE: 152 MMHG | RESPIRATION RATE: 16 BRPM | DIASTOLIC BLOOD PRESSURE: 56 MMHG | WEIGHT: 126 LBS | OXYGEN SATURATION: 98 % | TEMPERATURE: 98.3 F

## 2019-04-23 DIAGNOSIS — S09.90XA INJURY OF HEAD, INITIAL ENCOUNTER: ICD-10-CM

## 2019-04-23 DIAGNOSIS — S40.012A CONTUSION OF LEFT SHOULDER, INITIAL ENCOUNTER: ICD-10-CM

## 2019-04-23 DIAGNOSIS — N28.9 RENAL INSUFFICIENCY: ICD-10-CM

## 2019-04-23 DIAGNOSIS — S82.002A CLOSED NONDISPLACED FRACTURE OF LEFT PATELLA, UNSPECIFIED FRACTURE MORPHOLOGY, INITIAL ENCOUNTER: ICD-10-CM
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
## 2019-04-23 LAB
ANION GAP SERPL CALCULATED.3IONS-SCNC: 11 MMOL/L (ref 3–16)
BASOPHILS ABSOLUTE: 0 K/UL (ref 0–0.2)
BASOPHILS RELATIVE PERCENT: 0.5 %
BUN BLDV-MCNC: 35 MG/DL (ref 7–20)
CALCIUM SERPL-MCNC: 8.8 MG/DL (ref 8.3–10.6)
CHLORIDE BLD-SCNC: 101 MMOL/L (ref 99–110)
CO2: 26 MMOL/L (ref 21–32)
CREAT SERPL-MCNC: 1.3 MG/DL (ref 0.6–1.2)
EOSINOPHILS ABSOLUTE: 0.2 K/UL (ref 0–0.6)
EOSINOPHILS RELATIVE PERCENT: 2.5 %
GFR AFRICAN AMERICAN: 47
GFR NON-AFRICAN AMERICAN: 39
GLUCOSE BLD-MCNC: 186 MG/DL (ref 70–99)
HCT VFR BLD CALC: 31.9 % (ref 36–48)
HEMOGLOBIN: 10.9 G/DL (ref 12–16)
LYMPHOCYTES ABSOLUTE: 2.1 K/UL (ref 1–5.1)
LYMPHOCYTES RELATIVE PERCENT: 23.9 %
MCH RBC QN AUTO: 33.6 PG (ref 26–34)
MCHC RBC AUTO-ENTMCNC: 34.1 G/DL (ref 31–36)
MCV RBC AUTO: 98.4 FL (ref 80–100)
MONOCYTES ABSOLUTE: 0.8 K/UL (ref 0–1.3)
MONOCYTES RELATIVE PERCENT: 8.8 %
NEUTROPHILS ABSOLUTE: 5.5 K/UL (ref 1.7–7.7)
NEUTROPHILS RELATIVE PERCENT: 64.3 %
PDW BLD-RTO: 13.2 % (ref 12.4–15.4)
PLATELET # BLD: 177 K/UL (ref 135–450)
PMV BLD AUTO: 9.3 FL (ref 5–10.5)
POTASSIUM REFLEX MAGNESIUM: 4 MMOL/L (ref 3.5–5.1)
RBC # BLD: 3.24 M/UL (ref 4–5.2)
SODIUM BLD-SCNC: 138 MMOL/L (ref 136–145)
WBC # BLD: 8.6 K/UL (ref 4–11)

## 2019-04-23 PROCEDURE — 73560 X-RAY EXAM OF KNEE 1 OR 2: CPT

## 2019-04-23 PROCEDURE — 85025 COMPLETE CBC W/AUTO DIFF WBC: CPT

## 2019-04-23 PROCEDURE — 72125 CT NECK SPINE W/O DYE: CPT

## 2019-04-23 PROCEDURE — 99284 EMERGENCY DEPT VISIT MOD MDM: CPT

## 2019-04-23 PROCEDURE — 73030 X-RAY EXAM OF SHOULDER: CPT

## 2019-04-23 PROCEDURE — 2580000003 HC RX 258: Performed by: PHYSICIAN ASSISTANT

## 2019-04-23 PROCEDURE — 80048 BASIC METABOLIC PNL TOTAL CA: CPT

## 2019-04-23 PROCEDURE — 96360 HYDRATION IV INFUSION INIT: CPT

## 2019-04-23 PROCEDURE — 70450 CT HEAD/BRAIN W/O DYE: CPT

## 2019-04-23 RX ORDER — 0.9 % SODIUM CHLORIDE 0.9 %
1000 INTRAVENOUS SOLUTION INTRAVENOUS ONCE
Status: COMPLETED | OUTPATIENT
Start: 2019-04-23 | End: 2019-04-23

## 2019-04-23 RX ADMIN — SODIUM CHLORIDE 1000 ML: 9 INJECTION, SOLUTION INTRAVENOUS at 22:22

## 2019-04-23 ASSESSMENT — PAIN DESCRIPTION - PAIN TYPE: TYPE: ACUTE PAIN

## 2019-04-23 ASSESSMENT — PAIN DESCRIPTION - LOCATION: LOCATION: KNEE

## 2019-04-23 ASSESSMENT — PAIN SCALES - GENERAL: PAINLEVEL_OUTOF10: 3

## 2019-04-23 ASSESSMENT — PAIN DESCRIPTION - ORIENTATION: ORIENTATION: LEFT

## 2019-04-24 ASSESSMENT — ENCOUNTER SYMPTOMS
ABDOMINAL PAIN: 0
VOMITING: 0
BACK PAIN: 0
COUGH: 0
COLOR CHANGE: 1
SHORTNESS OF BREATH: 0
NAUSEA: 0
FACIAL SWELLING: 0

## 2019-04-24 NOTE — ED NOTES
Patient being discharge to [facility] with son providing transport. Discharge instructions given to [facility] and son for patient. Verbalized understanding.  Patient is alert and has knee immobilizer in place     [facility], [place]  04/24/19 0003

## 2019-04-24 NOTE — ED NOTES
Patient ambulated after knee brace applied. Patient ambulated with walker and two person assist. Gait steady once up and walking. Patient required constant verbal instructions from RN while ambulating. Son at bedside reports that was baseline for patient. Conception Luis BOATENG notified.       Catalina Gold RN  04/23/19 0056

## 2019-04-24 NOTE — ED NOTES
Called report to [facility], spoke with care provider Jcarlos Aguirre at this time. Gave her recommendations for follow up care.       [facility]  04/24/19 0002

## 2019-04-24 NOTE — ED PROVIDER NOTES
Emergency Department Provider Note     Location: [facility]  ED  4/23/2019    I independently performed a history and physical on Lorraine Haddad. All diagnostic, treatment, and disposition decisions were made by myself in conjunction with the mid-level provider. Briefly, this is a 80 y.o. female here for chief complaint of fall. Patient has presents today from her nursing home where she had a mechanical fall, landing on her left side. She is complained primarily of left knee pain as well as left shoulder pain. Denies hitting her head or loss of consciousness. Denies any other significant symptoms such as chest pains, shortness of breath, abdominal pain, changes in bowel movements or urinary symptoms, neck pain, numbness or tingling. .      ED Triage Vitals   BP Temp Temp Source Pulse Resp SpO2 Height Weight   04/23/19 2059 04/23/19 2058 04/23/19 2058 04/23/19 2058 04/23/19 2058 04/23/19 2058 -- 04/23/19 2057   (!) 146/55 98.3 °F (36.8 °C) Oral 55 16 100 %  126 lb (57.2 kg)        Patient is a 80 y.o. female laying in bed, talking normally appropriate. She does not appear to be in acute discomfort or distress. Patient does have some bruising to her left shoulder as well as tenderness to palpation of her left patella. There is no high riding patella or signs of tendon rupture that I can see on exam.  She is able to still ambulate, however with some discomfort. Workup here shows patella fracture and negative CT scans of her head and neck. Patient will be placed in a knee immobilizer with plan to follow up closely with orthopedics as an outpatient. She was able to ambulate with a knee immobilizer in place and her walker that she typically uses at home. At this point we do feel that she is safe for discharge back to her nursing home however we did discuss strict return precautions and close PCP follow-up.   Patient agrees with the plan at this time as no further concerns or questions. Xr Knee Left (1-2 Views)    Result Date: 4/23/2019  EXAMINATION: 2 XRAY VIEWS OF THE LEFT KNEE 4/23/2019 9:13 pm COMPARISON: None. HISTORY: ORDERING SYSTEM PROVIDED HISTORY: fall, pain TECHNOLOGIST PROVIDED HISTORY: Reason for exam:->fall, pain Ordering Physician Provided Reason for Exam: Fall (fall last night at [facility], xray done today stating fracture of L patella, bruising also to pt L shoulder ) Acuity: Acute Type of Exam: Initial FINDINGS: A vascular stent projects over posterior soft tissues left thigh. Arterial vascular calcifications are present within the soft tissues. There is mild medial patellofemoral compartmental osteoarthrosis with joint space loss and articular sclerosis. There is an acute transverse fracture the midpole of the patella. Anterior soft tissue swelling is identified. Fracture of the patella. Ct Head Wo Contrast    Result Date: 4/23/2019  EXAMINATION: CT OF THE HEAD WITHOUT CONTRAST  4/23/2019 9:31 pm TECHNIQUE: CT of the head was performed without the administration of intravenous contrast. Dose modulation, iterative reconstruction, and/or weight based adjustment of the mA/kV was utilized to reduce the radiation dose to as low as reasonably achievable. COMPARISON: October 9, 2018 HISTORY: ORDERING SYSTEM PROVIDED HISTORY: fall TECHNOLOGIST PROVIDED HISTORY: Has a \"code stroke\" or \"stroke alert\" been called? ->No Ordering Physician Provided Reason for Exam: Fall yesterday, hit head but did not experience LOC Acuity: Acute Type of Exam: Initial FINDINGS: BRAIN/VENTRICLES: Ventricles and sulci are stable. Multiple vascular calcifications are noted. A large amount of low-density in the periventricular and subcortical white matter is noted bilaterally. More focal low density is noted in the left posterior frontal parasagittal region just above the ventricle margin. Small prior lacunar infarct in the right lentiform nucleus is noted.   Multiple perivascular C4-5 and C5-6 due to posterior spurring. Multilevel neural foraminal narrowing is noted. SOFT TISSUES: No acute abnormality of the paraspinal soft tissues. Arterial vascular calcifications are present within the soft tissues. Biapical pleural and parenchymal scarring is seen. No acute abnormality of the cervical spine. Xr Shoulder Left (min 2 Views)    Result Date: 4/23/2019  EXAMINATION: 3 XRAY VIEWS OF THE LEFT SHOULDER 4/23/2019 9:13 pm COMPARISON: None. HISTORY: ORDERING SYSTEM PROVIDED HISTORY: pain s/p fall TECHNOLOGIST PROVIDED HISTORY: Reason for exam:->pain s/p fall Ordering Physician Provided Reason for Exam: Fall (fall last night at the [facility], xray done today stating fracture of L patella, bruising also to pt L shoulder ) Acuity: Acute Type of Exam: Initial FINDINGS: Mild left acromioclavicular and glenohumeral joint osteoarthrosis is identified with articular sclerosis and spurring. No acute fracture or dislocation is identified. Calcific densities adjacent to the greater tuberosity suggests rotator cuff calcific tendinosis. Moderate degenerative changes of the visualized lower left cervical spine are present. The visualized lungs are clear. No acute bony abnormality. For further details of Nataly Dominguez's emergency department encounter, please see Rafy Castano's documentation. This chart was generated in part by using Dragon Dictation system and may contain errors related to that system including errors in grammar, punctuation, and spelling, as well as words and phrases that may be inappropriate. If there are any questions or concerns please feel free to contact the dictating provider for clarification.              Lacy Harada, DO  04/24/19 0030

## 2019-04-24 NOTE — ED PROVIDER NOTES
[address] [facility]  ED  eMERGENCY dEPARTMENT eNCOUnter        Pt Name: Arnaldo Johnson  MRN: [id number]  [place] 11/30/1930  Date of evaluation: 4/23/2019  Provider: Tang Bedoya PA-C  PCP: King Graves  ED Attending: Ulysses Nap, DO    History provided by the patient    CHIEF COMPLAINT:     Chief Complaint   Patient presents with    Fall     fall last night at the [address] [place] [address], xray done today stating fracture of L patella, bruising also to pt L shoulder        HISTORY OF PRESENT ILLNESS:      Arnaldo Johnson is a 80 y.o. female who arrives to the ED by private vehicle. Her son brought her. The patient is a resident at [facility]. She suffered a mechanical fall last night. Upon falling she injured her left knee, shoulder and bumped her head. She complained of left knee pain today and had an outpatient x-ray done ultimately revealed a left patellar fracture. Given the positive finding, she was sent to the emergency department for evaluation. In talking to her she also describes her left shoulder pain and has some bruising over the proximal humerus and over the left clavicle. The patient bumped her head she states it is a small bump and she is not drinking any headache as a result. She denies neck pain or back pain. She has had pain with ambulation secondary to her left knee injury. She admitted to the walker at baseline. Outside of this the patient is without complaints. She has had a good appetite. She is not been sick otherwise. Nursing Notes were reviewed     REVIEW OF SYSTEMS:     Review of Systems   Constitutional: Negative for appetite change and fever. HENT: Negative for facial swelling. Eyes: Negative for visual disturbance. Respiratory: Negative for cough and shortness of breath. Cardiovascular: Negative for chest pain. Gastrointestinal: Negative for abdominal pain, nausea and vomiting. Genitourinary: Negative.     Musculoskeletal: Positive for arthralgias (left knee, left shoulder) and gait problem. Negative for back pain and neck pain. Skin: Positive for color change (bruising left shoulder). Neurological: Negative for dizziness, weakness and numbness. All other systems reviewed and are negative. Exceptas noted above in the ROS, all other systems were reviewed and negative.          PAST MEDICAL HISTORY:     Past Medical History:   Diagnosis Date    CAD (coronary artery disease)     COPD (chronic obstructive pulmonary disease) ([email or web address]     Depression     Diabetes mellitus (HCC)     GERD (gastroesophageal reflux disease)     Hyperlipidemia     Hypertension     Mood disorder ([facility].)     Osteoarthritis     Osteoporosis     Spondylolysis          SURGICAL HISTORY:      Past Surgical History:   Procedure Laterality Date    BLEPHAROPLASTY Bilateral     EYE SURGERY Bilateral     cataracts         CURRENT MEDICATIONS:       Discharge Medication List as of 4/23/2019 11:51 PM      CONTINUE these medications which have NOT CHANGED    Details   ipratropium-albuterol (DUONEB) 0.5-2.5 (3) MG/3ML SOLN nebulizer solution Inhale 3 mLs into the lungs every 4 hours, Disp-360 mL, R-0NO PRINT      carvedilol (COREG) 3.125 MG tablet Take 1 tablet by mouth 2 times daily (with meals), Disp-60 tablet, R-0NO PRINT      amLODIPine (NORVASC) 10 MG tablet Take 1 tablet by mouth daily, Disp-30 tablet, R-3NO PRINT      aspirin 81 MG tablet Take 81 mg by mouth dailyHistorical Med      calcium citrate-vitamin D (CITRICAL + D) 315-250 MG-UNIT TABS per tablet Take 1 tablet by mouth daily (with breakfast)Historical Med      docusate sodium (COLACE) 100 MG capsule Take 100 mg by mouth dailyHistorical Med      ferrous sulfate 325 (65 Fe) MG tablet Take 325 mg by mouth daily (with breakfast)Historical Med      fluticasone (FLONASE) 50 MCG/ACT nasal spray 1 spray by Each Nare route daily as needed for RhinitisHistorical Med      ibuprofen (ADVIL;MOTRIN) 400 MG tablet Take 400 mg by mouth every 4 hours as needed for PainHistorical Med      lactulose (CHRONULAC) 10 GM/15ML solution Take 20 g by mouth dailyHistorical Med      melatonin 3 MG TABS tablet Take 3 mg by mouth daily Give 1 and 1/2 tabletHistorical Med      polyethylene glycol (GLYCOLAX) powder Take 17 g by mouth daily as neededHistorical Med      nicotine polacrilex (NICORETTE) 2 MG lozenge Take 2 mg by mouth as needed for Smoking cessationHistorical Med      Multiple Vitamins-Minerals (PRESERVISION AREDS PO) Take by mouth dailyHistorical Med      desvenlafaxine succinate (PRISTIQ) 100 MG TB24 extended release tablet Take by mouth every other dayHistorical Med      rosuvastatin (CRESTOR) 5 MG tablet Take 5 mg by mouth nightlyHistorical Med               ALLERGIES:    Amoxicillin-pot clavulanate    FAMILY HISTORY:       Family History   Problem Relation Age of Onset    Diabetes Mother     Elevated Lipids Father     Diabetes Son           SOCIAL HISTORY:       Social History     Socioeconomic History    Marital status:       Spouse name: None    Number of children: None    Years of education: None    Highest education level: None   Occupational History    None   Social Needs    Financial resource strain: None    Food insecurity:     Worry: None     Inability: None    Transportation needs:     Medical: None     Non-medical: None   Tobacco Use    Smoking status: Former Smoker   Substance and Sexual Activity    Alcohol use: No    Drug use: None    Sexual activity: None   Lifestyle    Physical activity:     Days per week: None     Minutes per session: None    Stress: None   Relationships    Social connections:     Talks on phone: None     Gets together: None     Attends Gnosticism service: None     Active member of club or organization: None     Attends meetings of clubs or organizations: None     Relationship status: None    Intimate partner violence:     Fear of current or ex partner: None     Emotionally abused: None Physically abused: None     Forced sexual activity: None   Other Topics Concern    None   Social History Narrative    None       SCREENINGS:    Loren Coma Scale  Eye Opening: Spontaneous  Best Verbal Response: Oriented  Best Motor Response: Obeys commands  [place] Coma Scale Score: 15        PHYSICAL EXAM:       ED Triage Vitals   BP Temp Temp Source Pulse Resp SpO2 Height Weight   04/23/19 2059 04/23/19 2058 04/23/19 2058 04/23/19 2058 04/23/19 2058 04/23/19 2058 -- 04/23/19 2057   (!) 146/55 98.3 °F (36.8 °C) Oral 55 16 100 %  126 lb (57.2 kg)       Physical Exam    CONSTITUTIONAL: Awake and alert. Cooperative. Well-developed. Well-nourished. Non-toxic. No acute distress. HENT: Normocephalic. Atraumatic--no palpable contusion or swelling. External ears normal, without discharge. No nasal discharge. Oropharynx clear. Mucous membranes moist.  EYES: Conjunctiva non-injected. No scleral icterus. PERRL. EOM's grossly intact. NECK: Supple. Normal ROM. No pain to palpate the posterior cervical spine. CARDIOVASCULAR: RRR. No Murmer. Intact distal pulses. PULMONARY/CHEST WALL: Effort normal. No tachypnea. Lungs clear to ausculation. ABDOMEN: Soft. Nondistended. No tenderness to palpate. No guarding. /ANORECTAL: Not assessed  MUSKULOSKELETAL: Left knee: Mild swelling and bruising anteriorly with superficial abrasion. Tenderness to palpation anteriorly. Decreased ROM. No acute deformities. No edema. Left shoulder: Point tenderness to the proximal humerus with associated bruising. Mild pain over the anterior aspect of the joint and clavicle with subtle bruising. No bony step-off. No deformity. Slight decreased ROM of joint. SKIN: Warm and dry. No rash. NEUROLOGICAL: Alert and oriented x 3. GCS 15. CN II-XII grossly intact. Strength is 5/5 in all extremities and sensation is intact. Slow gait. Patient walks with the assistance of a walker.   PSYCHIATRIC: Normal affect        DIAGNOSTICRESULTS: LABS:    Results for orders placed or performed during the hospital encounter of 04/23/19   CBC Auto Differential   Result Value Ref Range    WBC 8.6 4.0 - 11.0 K/uL    RBC 3.24 (L) 4.00 - 5.20 M/uL    Hemoglobin 10.9 (L) 12.0 - 16.0 g/dL    Hematocrit 31.9 (L) 36.0 - 48.0 %    MCV 98.4 80.0 - 100.0 fL    MCH 33.6 26.0 - 34.0 pg    MCHC 34.1 31.0 - 36.0 g/dL    RDW 13.2 12.4 - 15.4 %    Platelets 565 460 - 028 K/uL    MPV 9.3 5.0 - 10.5 fL    Neutrophils % 64.3 %    Lymphocytes % 23.9 %    Monocytes % 8.8 %    Eosinophils % 2.5 %    Basophils % 0.5 %    Neutrophils # 5.5 1.7 - 7.7 K/uL    Lymphocytes # 2.1 1.0 - 5.1 K/uL    Monocytes # 0.8 0.0 - 1.3 K/uL    Eosinophils # 0.2 0.0 - 0.6 K/uL    Basophils # 0.0 0.0 - 0.2 K/uL   Basic Metabolic Panel w/ Reflex to MG   Result Value Ref Range    Sodium 138 136 - 145 mmol/L    Potassium reflex Magnesium 4.0 3.5 - 5.1 mmol/L    Chloride 101 99 - 110 mmol/L    CO2 26 21 - 32 mmol/L    Anion Gap 11 3 - 16    Glucose 186 (H) 70 - 99 mg/dL    BUN 35 (H) 7 - 20 mg/dL    CREATININE 1.3 (H) 0.6 - 1.2 mg/dL    GFR Non-African American 39 (A) >60    GFR  47 (A) >60    Calcium 8.8 8.3 - 10.6 mg/dL         RADIOLOGY:  All x-ray studies areviewed/reviewed by me. Formal interpretations per the radiologist are as follows:      Xr Knee Left (1-2 Views)    Result Date: 4/23/2019  EXAMINATION: 2 XRAY VIEWS OF THE LEFT KNEE 4/23/2019 9:13 pm COMPARISON: None. HISTORY: ORDERING SYSTEM PROVIDED HISTORY: fall, pain TECHNOLOGIST PROVIDED HISTORY: Reason for exam:->fall, pain Ordering Physician Provided Reason for Exam: Fall (fall last night at the [facility], xray done today stating fracture of L patella, bruising also to pt L shoulder ) Acuity: Acute Type of Exam: Initial FINDINGS: A vascular stent projects over posterior soft tissues left thigh. Arterial vascular calcifications are present within the soft tissues.  There is mild medial patellofemoral compartmental osteoarthrosis with joint space loss and articular sclerosis. There is an acute transverse fracture the midpole of the patella. Anterior soft tissue swelling is identified. Fracture of the patella. Ct Head Wo Contrast    Result Date: 4/23/2019  EXAMINATION: CT OF THE HEAD WITHOUT CONTRAST  4/23/2019 9:31 pm TECHNIQUE: CT of the head was performed without the administration of intravenous contrast. Dose modulation, iterative reconstruction, and/or weight based adjustment of the mA/kV was utilized to reduce the radiation dose to as low as reasonably achievable. COMPARISON: October 9, 2018 HISTORY: ORDERING SYSTEM PROVIDED HISTORY: fall TECHNOLOGIST PROVIDED HISTORY: Has a \"code stroke\" or \"stroke alert\" been called? ->No Ordering Physician Provided Reason for Exam: Fall yesterday, hit head but did not experience LOC Acuity: Acute Type of Exam: Initial FINDINGS: BRAIN/VENTRICLES: Ventricles and sulci are stable. Multiple vascular calcifications are noted. A large amount of low-density in the periventricular and subcortical white matter is noted bilaterally. More focal low density is noted in the left posterior frontal parasagittal region just above the ventricle margin. Small prior lacunar infarct in the right lentiform nucleus is noted. Multiple perivascular spaces are suggested. There is no parenchymal or extra-axial hematoma. ORBITS: Curvilinear calcifications along the posterior aspect of the right globe are again noted. Correlate with ophthalmologic exam. SINUSES: A small amount of fluid in the sphenoid sinus is noted. Otherwise, minimal paranasal sinus mucosal thickening is noted. Focal high density near the right sided ossicles is noted raising the question of prior surgery. SOFT TISSUES/SKULL:  No acute abnormality of the visualized skull or soft tissues. No acute hemorrhage Multifocal small-vessel ischemic change and small prior infarcts as described.  Calcifications along the posterior aspect of the right globe are again noted. Correlate with ophthalmologic exam     Ct Cervical Spine Wo Contrast    Result Date: 4/23/2019  EXAMINATION: CT OF THE CERVICAL SPINE WITHOUT CONTRAST 4/23/2019 9:31 pm TECHNIQUE: CT of the cervical spine was performed without the administration of intravenous contrast. Multiplanar reformatted images are provided for review. Dose modulation, iterative reconstruction, and/or weight based adjustment of the mA/kV was utilized to reduce the radiation dose to as low as reasonably achievable. COMPARISON: October 2018 neck CT HISTORY: ORDERING SYSTEM PROVIDED HISTORY: fall TECHNOLOGIST PROVIDED HISTORY: Ordering Physician Provided Reason for Exam: Fall yesterday, hit head but did not experience LOC Acuity: Acute Type of Exam: Initial FINDINGS: BONES/ALIGNMENT: There is no evidence of an acute cervical spine fracture. There is mild 3 mm anterolisthesis at C7-T1, likely degenerative; the posterior elements remain aligned. No acute fracture. . DEGENERATIVE CHANGES: Moderate multilevel degenerative disc disease is present, primarily from C3-4 through C6-7, with disc space narrowing, endplate sclerosis and spurring. Moderate facet hypertrophy changes are also present throughout the cervical spine. Mild central canal narrowing is present at C4-5 and C5-6 due to posterior spurring. Multilevel neural foraminal narrowing is noted. SOFT TISSUES: No acute abnormality of the paraspinal soft tissues. Arterial vascular calcifications are present within the soft tissues. Biapical pleural and parenchymal scarring is seen. No acute abnormality of the cervical spine. Xr Shoulder Left (min 2 Views)    Result Date: 4/23/2019  EXAMINATION: 3 XRAY VIEWS OF THE LEFT SHOULDER 4/23/2019 9:13 pm COMPARISON: None.  HISTORY: ORDERING SYSTEM PROVIDED HISTORY: pain s/p fall TECHNOLOGIST PROVIDED HISTORY: Reason for exam:->pain s/p fall Ordering Physician Provided Reason for Exam: Fall (fall last night at the [address] [place] [address], xray done today stating fracture of L patella, bruising also to pt L shoulder ) Acuity: Acute Type of Exam: Initial FINDINGS: Mild left acromioclavicular and glenohumeral joint osteoarthrosis is identified with articular sclerosis and spurring. No acute fracture or dislocation is identified. Calcific densities adjacent to the greater tuberosity suggests rotator cuff calcific tendinosis. Moderate degenerative changes of the visualized lower left cervical spine are present. The visualized lungs are clear. No acute bony abnormality. PROCEDURES:   N/A    CRITICAL CARE TIME:       None    CONSULTS:  None      EMERGENCY DEPARTMENT COURSE and DIFFERENTIAL DIAGNOSIS/MDM:   Vitals:    Vitals:    04/23/19 2224 04/23/19 2245 04/23/19 2311 04/23/19 2330   BP: (!) 96/53 (!) 98/45 (!) 98/57 (!) 152/56   Pulse: 52 55 54 59   Resp: 14 16 14 16   Temp:       TempSrc:       SpO2: 96% 96% 97% 98%   Weight:           Patient was given the following medications:  Medications   0.9 % sodium chloride bolus (0 mLs Intravenous Stopped 4/23/19 2329)         Patient was evaluated by both myself and Renetta Buenrostro DO. The patient suffered a mechanical fall at her skilled nursing facility last night. She complained of left knee pain since then and had an outpatient x-ray today revealed a left patella fracture. Talking with the patient she has a left knee pain at worst complaint/injury. However, she additionally has some left shoulder pain and reports bumping her head. Given that history and x-ray of the left shoulder, left nasal CT imaging of the head and neck are done. The only acute trauma or injury noted is a nondisplaced fracture through the left patella. While the patient was here in the ED she was found to be borderline hypotensive with multiple blood pressure readings in the 93O (systolic). Given that she was ordered 1 L of normal saline IV and screening CBC and BMP. CBC reveals hemoglobin of 10.9 and hematocrit 31.9. MD Prabhakar    Schedule an appointment as soon as possible for a visit         DISCHARGE MEDICATIONS:  Discharge Medication List as of 4/23/2019 11:51 PM                     (Please note thatportions of this note were completed with a voice recognition program.  Efforts were made to edit the dictations, but occasionally words are mis-transcribed.)    Bob Rodriguez PA-C (electronicallysigned)              KILO Romero  04/24/19 0100

## 2019-04-30 DIAGNOSIS — M25.562 LEFT KNEE PAIN, UNSPECIFIED CHRONICITY: Primary | ICD-10-CM

## 2019-05-11 NOTE — ED PROVIDER NOTES
Range    Sodium 137 136 - 145 mmol/L    Potassium 4.3 3.5 - 5.1 mmol/L    Chloride 100 99 - 110 mmol/L    CO2 27 21 - 32 mmol/L    Anion Gap 10 3 - 16    Glucose 136 (H) 70 - 99 mg/dL    BUN 21 (H) 7 - 20 mg/dL    CREATININE 0.7 0.6 - 1.2 mg/dL    GFR Non-African American >60 >60    GFR African American >60 >60    Calcium 9.2 8.3 - 10.6 mg/dL    Total Protein 6.6 6.4 - 8.2 g/dL    Alb 3.6 3.4 - 5.0 g/dL    Albumin/Globulin Ratio 1.2 1.1 - 2.2    Total Bilirubin <0.2 0.0 - 1.0 mg/dL    Alkaline Phosphatase 57 40 - 129 U/L    ALT 9 (L) 10 - 40 U/L    AST 13 (L) 15 - 37 U/L    Globulin 3.0 g/dL   Troponin   Result Value Ref Range    Troponin <0.01 <0.01 ng/mL   Urinalysis   Result Value Ref Range    Color, UA Yellow Straw/Yellow    Clarity, UA Clear Clear    Glucose, Ur Negative Negative mg/dL    Bilirubin Urine Negative Negative    Ketones, Urine Negative Negative mg/dL    Specific Gravity, UA 1.015 1.005 - 1.030    Blood, Urine SMALL (A) Negative    pH, UA 6.0 5.0 - 8.0    Protein, UA Negative Negative mg/dL    Urobilinogen, Urine 0.2 <2.0 E.U./dL    Nitrite, Urine Negative Negative    Leukocyte Esterase, Urine SMALL (A) Negative    Microscopic Examination YES     Urine Type Not Specified    Microscopic Urinalysis   Result Value Ref Range    Mucus, UA 1+ (A) /LPF    WBC, UA 6-10 (A) 0 - 5 /HPF    RBC, UA 3-5 (A) 0 - 2 /HPF    Epi Cells 5-10 /HPF    Renal Epithelial, Urine 3-5 (A) /HPF    Bacteria, UA 4+ (A) /HPF   POCT Glucose   Result Value Ref Range    POC Glucose 142 (H) 70 - 99 mg/dl    Performed on [facility] [employer]    EKG 12 Lead   Result Value Ref Range    Ventricular Rate 63 BPM    Atrial Rate 63 BPM    P-R Interval 174 ms    QRS Duration 78 ms    Q-T Interval 420 ms    QTc Calculation (Bazett) 429 ms    P Axis 84 degrees    R Axis 22 degrees    T Axis 54 degrees    Diagnosis       Normal sinus rhythmBaseline wander obscures Septal infarct , age undetermined  possiblyBorderline ECGNo previous ECGs availableConfirmed
76yoM w/ PMHx DM type 1 (for 48 yrs), HTN, HLD, ESRD on HD MWF p/w left foot pain. Found to be in septic shock on admission, and taken to OR for debridement of necrotizing fasciitis of left foot to bone. Pt monitored in the SICU, intubated and on pressors support. Renal called for pts ESRD.
performed and interpreted by Dr. Roxi Rosales. Patient denied any pain therefore no medication was administered during ED course of treatment. CBC revealed no leukocytosis. CMP unremarkable. Troponin negative at<0.01. Microscopic urinalysis revealed 6-10 WBCs, 3-5 RBCs, 5-10 epithelial cells, and negative for nitrites. Glucose within normal ranges. Patient remained and symptomatic ED course of treatment and son denies any new symptoms as well. All ED findings were discussed in detail with ER attending Dr. Roxi Rosales prior to disposition. A discussion was had with Mrs. Rad Chavez regarding ED findings. All questions were answered. Patient will follow up with  PCP in 1-2 days for further evaluation/treatment. Patient will return to ED for new/worsening symptoms. Patient comfortable upon discharge, strict return precautions discussed in detail with patient and son. Patient agreeable with plan of care, treatment, and discharge back to [facility] at this time.      MDM  Results for orders placed or performed during the hospital encounter of 11/30/18   CBC Auto Differential   Result Value Ref Range    WBC 8.4 4.0 - 11.0 K/uL    RBC 3.70 (L) 4.00 - 5.20 M/uL    Hemoglobin 12.0 12.0 - 16.0 g/dL    Hematocrit 36.1 36.0 - 48.0 %    MCV 97.4 80.0 - 100.0 fL    MCH 32.5 26.0 - 34.0 pg    MCHC 33.3 31.0 - 36.0 g/dL    RDW 13.0 12.4 - 15.4 %    Platelets 844 096 - 704 K/uL    MPV 9.1 5.0 - 10.5 fL    Neutrophils % 66.3 %    Lymphocytes % 23.7 %    Monocytes % 7.7 %    Eosinophils % 2.0 %    Basophils % 0.3 %    Neutrophils # 5.5 1.7 - 7.7 K/uL    Lymphocytes # 2.0 1.0 - 5.1 K/uL    Monocytes # 0.6 0.0 - 1.3 K/uL    Eosinophils # 0.2 0.0 - 0.6 K/uL    Basophils # 0.0 0.0 - 0.2 K/uL   Comprehensive Metabolic Panel   Result Value Ref Range    Sodium 137 136 - 145 mmol/L    Potassium 4.3 3.5 - 5.1 mmol/L    Chloride 100 99 - 110 mmol/L    CO2 27 21 - 32 mmol/L    Anion Gap 10 3 - 16    Glucose 136 (H) 70 - 99 mg/dL    BUN 21 (H) 7 - 20 mg/dL

## 2019-05-14 DIAGNOSIS — M25.562 LEFT KNEE PAIN, UNSPECIFIED CHRONICITY: Primary | ICD-10-CM

## 2019-05-28 DIAGNOSIS — M25.562 LEFT KNEE PAIN, UNSPECIFIED CHRONICITY: Primary | ICD-10-CM

## 2019-06-03 ENCOUNTER — APPOINTMENT (OUTPATIENT)
Dept: GENERAL RADIOLOGY | Age: 84
DRG: 291 | End: 2019-06-03
Payer: MEDICARE

## 2019-06-03 ENCOUNTER — HOSPITAL ENCOUNTER (INPATIENT)
Age: 84
LOS: 2 days | Discharge: NURSING, REHAB OR LONG-TERM CARE | DRG: 291 | End: 2019-06-07
Attending: EMERGENCY MEDICINE | Admitting: INTERNAL MEDICINE
Payer: MEDICARE

## 2019-06-03 ENCOUNTER — APPOINTMENT (OUTPATIENT)
Dept: CT IMAGING | Age: 84
DRG: 291 | End: 2019-06-03
Payer: MEDICARE
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
## 2019-06-03 DIAGNOSIS — R06.02 SHORTNESS OF BREATH: ICD-10-CM

## 2019-06-03 DIAGNOSIS — J18.9 PNEUMONIA OF RIGHT LOWER LOBE DUE TO INFECTIOUS ORGANISM: ICD-10-CM

## 2019-06-03 DIAGNOSIS — J18.9 HOSPITAL-ACQUIRED PNEUMONIA: ICD-10-CM

## 2019-06-03 DIAGNOSIS — I50.9 CONGESTIVE HEART FAILURE, UNSPECIFIED HF CHRONICITY, UNSPECIFIED HEART FAILURE TYPE (HCC): Primary | ICD-10-CM

## 2019-06-03 DIAGNOSIS — R05.9 COUGH: ICD-10-CM

## 2019-06-03 PROBLEM — I50.43 CHF (CONGESTIVE HEART FAILURE), NYHA CLASS I, ACUTE ON CHRONIC, COMBINED (HCC): Status: ACTIVE | Noted: 2019-06-03

## 2019-06-03 LAB
A/G RATIO: 0.9 (ref 1.1–2.2)
ALBUMIN SERPL-MCNC: 3.4 G/DL (ref 3.4–5)
ALP BLD-CCNC: 77 U/L (ref 40–129)
ALT SERPL-CCNC: 15 U/L (ref 10–40)
ANION GAP SERPL CALCULATED.3IONS-SCNC: 13 MMOL/L (ref 3–16)
AST SERPL-CCNC: 17 U/L (ref 15–37)
BASOPHILS ABSOLUTE: 0 K/UL (ref 0–0.2)
BASOPHILS RELATIVE PERCENT: 0.3 %
BILIRUB SERPL-MCNC: 0.3 MG/DL (ref 0–1)
BILIRUBIN URINE: NEGATIVE
BLOOD, URINE: NEGATIVE
BUN BLDV-MCNC: 34 MG/DL (ref 7–20)
CALCIUM SERPL-MCNC: 9.2 MG/DL (ref 8.3–10.6)
CHLORIDE BLD-SCNC: 99 MMOL/L (ref 99–110)
CLARITY: CLEAR
CO2: 25 MMOL/L (ref 21–32)
COLOR: YELLOW
CREAT SERPL-MCNC: 0.9 MG/DL (ref 0.6–1.2)
EKG ATRIAL RATE: 150 BPM
EKG DIAGNOSIS: NORMAL
EKG Q-T INTERVAL: 338 MS
EKG QRS DURATION: 82 MS
EKG QTC CALCULATION (BAZETT): 433 MS
EKG R AXIS: 34 DEGREES
EKG T AXIS: 74 DEGREES
EKG VENTRICULAR RATE: 99 BPM
EOSINOPHILS ABSOLUTE: 0.1 K/UL (ref 0–0.6)
EOSINOPHILS RELATIVE PERCENT: 0.6 %
GFR AFRICAN AMERICAN: >60
GFR NON-AFRICAN AMERICAN: 59
GLOBULIN: 3.7 G/DL
GLUCOSE BLD-MCNC: 147 MG/DL (ref 70–99)
GLUCOSE URINE: NEGATIVE MG/DL
HCT VFR BLD CALC: 33.1 % (ref 36–48)
HEMOGLOBIN: 11.1 G/DL (ref 12–16)
KETONES, URINE: ABNORMAL MG/DL
LACTIC ACID: 1.3 MMOL/L (ref 0.4–2)
LEUKOCYTE ESTERASE, URINE: NEGATIVE
LYMPHOCYTES ABSOLUTE: 1.1 K/UL (ref 1–5.1)
LYMPHOCYTES RELATIVE PERCENT: 11 %
MCH RBC QN AUTO: 33.3 PG (ref 26–34)
MCHC RBC AUTO-ENTMCNC: 33.5 G/DL (ref 31–36)
MCV RBC AUTO: 99.5 FL (ref 80–100)
MICROSCOPIC EXAMINATION: ABNORMAL
MONOCYTES ABSOLUTE: 1.4 K/UL (ref 0–1.3)
MONOCYTES RELATIVE PERCENT: 14.1 %
NEUTROPHILS ABSOLUTE: 7.1 K/UL (ref 1.7–7.7)
NEUTROPHILS RELATIVE PERCENT: 74 %
NITRITE, URINE: NEGATIVE
PDW BLD-RTO: 13.5 % (ref 12.4–15.4)
PH UA: 6 (ref 5–8)
PLATELET # BLD: 248 K/UL (ref 135–450)
PMV BLD AUTO: 8.8 FL (ref 5–10.5)
POTASSIUM SERPL-SCNC: 4 MMOL/L (ref 3.5–5.1)
PRO-BNP: 4336 PG/ML (ref 0–449)
PROCALCITONIN: 0.31 NG/ML (ref 0–0.15)
PROTEIN UA: NEGATIVE MG/DL
RBC # BLD: 3.32 M/UL (ref 4–5.2)
SODIUM BLD-SCNC: 137 MMOL/L (ref 136–145)
SPECIFIC GRAVITY UA: <=1.005 (ref 1–1.03)
TOTAL PROTEIN: 7.1 G/DL (ref 6.4–8.2)
TROPONIN: <0.01 NG/ML
URINE REFLEX TO CULTURE: ABNORMAL
URINE TYPE: ABNORMAL
UROBILINOGEN, URINE: 1 E.U./DL
WBC # BLD: 9.6 K/UL (ref 4–11)

## 2019-06-03 PROCEDURE — 96375 TX/PRO/DX INJ NEW DRUG ADDON: CPT

## 2019-06-03 PROCEDURE — 6360000002 HC RX W HCPCS: Performed by: NURSE PRACTITIONER

## 2019-06-03 PROCEDURE — 85025 COMPLETE CBC W/AUTO DIFF WBC: CPT

## 2019-06-03 PROCEDURE — 71260 CT THORAX DX C+: CPT

## 2019-06-03 PROCEDURE — 6360000004 HC RX CONTRAST MEDICATION: Performed by: EMERGENCY MEDICINE

## 2019-06-03 PROCEDURE — 2580000003 HC RX 258: Performed by: INTERNAL MEDICINE

## 2019-06-03 PROCEDURE — 94761 N-INVAS EAR/PLS OXIMETRY MLT: CPT

## 2019-06-03 PROCEDURE — 71045 X-RAY EXAM CHEST 1 VIEW: CPT

## 2019-06-03 PROCEDURE — 2580000003 HC RX 258

## 2019-06-03 PROCEDURE — 84145 PROCALCITONIN (PCT): CPT

## 2019-06-03 PROCEDURE — 93005 ELECTROCARDIOGRAM TRACING: CPT | Performed by: NURSE PRACTITIONER

## 2019-06-03 PROCEDURE — 6370000000 HC RX 637 (ALT 250 FOR IP): Performed by: EMERGENCY MEDICINE

## 2019-06-03 PROCEDURE — 2580000003 HC RX 258: Performed by: NURSE PRACTITIONER

## 2019-06-03 PROCEDURE — 83880 ASSAY OF NATRIURETIC PEPTIDE: CPT

## 2019-06-03 PROCEDURE — 94640 AIRWAY INHALATION TREATMENT: CPT

## 2019-06-03 PROCEDURE — 6370000000 HC RX 637 (ALT 250 FOR IP): Performed by: INTERNAL MEDICINE

## 2019-06-03 PROCEDURE — 83605 ASSAY OF LACTIC ACID: CPT

## 2019-06-03 PROCEDURE — 93010 ELECTROCARDIOGRAM REPORT: CPT | Performed by: INTERNAL MEDICINE

## 2019-06-03 PROCEDURE — 81003 URINALYSIS AUTO W/O SCOPE: CPT

## 2019-06-03 PROCEDURE — G0378 HOSPITAL OBSERVATION PER HR: HCPCS

## 2019-06-03 PROCEDURE — 96367 TX/PROPH/DG ADDL SEQ IV INF: CPT

## 2019-06-03 PROCEDURE — 87040 BLOOD CULTURE FOR BACTERIA: CPT

## 2019-06-03 PROCEDURE — 99285 EMERGENCY DEPT VISIT HI MDM: CPT

## 2019-06-03 PROCEDURE — 80053 COMPREHEN METABOLIC PANEL: CPT

## 2019-06-03 PROCEDURE — 84484 ASSAY OF TROPONIN QUANT: CPT

## 2019-06-03 PROCEDURE — 6370000000 HC RX 637 (ALT 250 FOR IP): Performed by: NURSE PRACTITIONER

## 2019-06-03 PROCEDURE — 51702 INSERT TEMP BLADDER CATH: CPT

## 2019-06-03 PROCEDURE — 6360000002 HC RX W HCPCS: Performed by: EMERGENCY MEDICINE

## 2019-06-03 PROCEDURE — 2700000000 HC OXYGEN THERAPY PER DAY

## 2019-06-03 PROCEDURE — 96365 THER/PROPH/DIAG IV INF INIT: CPT

## 2019-06-03 RX ORDER — ACETAMINOPHEN 325 MG/1
650 TABLET ORAL EVERY 4 HOURS PRN
Status: DISCONTINUED | OUTPATIENT
Start: 2019-06-03 | End: 2019-06-07 | Stop reason: HOSPADM

## 2019-06-03 RX ORDER — IPRATROPIUM BROMIDE AND ALBUTEROL SULFATE 2.5; .5 MG/3ML; MG/3ML
3 SOLUTION RESPIRATORY (INHALATION) EVERY 4 HOURS PRN
Status: DISCONTINUED | OUTPATIENT
Start: 2019-06-03 | End: 2019-06-03

## 2019-06-03 RX ORDER — FUROSEMIDE 10 MG/ML
40 INJECTION INTRAMUSCULAR; INTRAVENOUS ONCE
Status: COMPLETED | OUTPATIENT
Start: 2019-06-03 | End: 2019-06-03

## 2019-06-03 RX ORDER — ONDANSETRON 2 MG/ML
4 INJECTION INTRAMUSCULAR; INTRAVENOUS EVERY 6 HOURS PRN
Status: DISCONTINUED | OUTPATIENT
Start: 2019-06-03 | End: 2019-06-07 | Stop reason: HOSPADM

## 2019-06-03 RX ORDER — SODIUM CHLORIDE 0.9 % (FLUSH) 0.9 %
10 SYRINGE (ML) INJECTION EVERY 12 HOURS SCHEDULED
Status: DISCONTINUED | OUTPATIENT
Start: 2019-06-03 | End: 2019-06-07 | Stop reason: HOSPADM

## 2019-06-03 RX ORDER — SODIUM CHLORIDE 0.9 % (FLUSH) 0.9 %
10 SYRINGE (ML) INJECTION PRN
Status: DISCONTINUED | OUTPATIENT
Start: 2019-06-03 | End: 2019-06-07 | Stop reason: HOSPADM

## 2019-06-03 RX ORDER — CARVEDILOL 3.12 MG/1
3.12 TABLET ORAL 2 TIMES DAILY WITH MEALS
Status: DISCONTINUED | OUTPATIENT
Start: 2019-06-04 | End: 2019-06-07 | Stop reason: HOSPADM

## 2019-06-03 RX ORDER — 0.9 % SODIUM CHLORIDE 0.9 %
1000 INTRAVENOUS SOLUTION INTRAVENOUS ONCE
Status: COMPLETED | OUTPATIENT
Start: 2019-06-03 | End: 2019-06-03

## 2019-06-03 RX ORDER — FUROSEMIDE 10 MG/ML
40 INJECTION INTRAMUSCULAR; INTRAVENOUS 2 TIMES DAILY
Status: DISCONTINUED | OUTPATIENT
Start: 2019-06-04 | End: 2019-06-05

## 2019-06-03 RX ORDER — IPRATROPIUM BROMIDE AND ALBUTEROL SULFATE 2.5; .5 MG/3ML; MG/3ML
1 SOLUTION RESPIRATORY (INHALATION) ONCE
Status: COMPLETED | OUTPATIENT
Start: 2019-06-03 | End: 2019-06-03

## 2019-06-03 RX ORDER — METHYLPREDNISOLONE SODIUM SUCCINATE 125 MG/2ML
80 INJECTION, POWDER, LYOPHILIZED, FOR SOLUTION INTRAMUSCULAR; INTRAVENOUS ONCE
Status: COMPLETED | OUTPATIENT
Start: 2019-06-03 | End: 2019-06-03

## 2019-06-03 RX ORDER — IPRATROPIUM BROMIDE AND ALBUTEROL SULFATE 2.5; .5 MG/3ML; MG/3ML
3 SOLUTION RESPIRATORY (INHALATION)
Status: DISCONTINUED | OUTPATIENT
Start: 2019-06-04 | End: 2019-06-06

## 2019-06-03 RX ORDER — SODIUM CHLORIDE 9 MG/ML
INJECTION, SOLUTION INTRAVENOUS
Status: COMPLETED
Start: 2019-06-03 | End: 2019-06-03

## 2019-06-03 RX ORDER — ASPIRIN 81 MG/1
81 TABLET ORAL DAILY
Status: DISCONTINUED | OUTPATIENT
Start: 2019-06-04 | End: 2019-06-07 | Stop reason: HOSPADM

## 2019-06-03 RX ADMIN — Medication 10 ML: at 23:28

## 2019-06-03 RX ADMIN — CEFTRIAXONE SODIUM 1 G: 1 INJECTION, POWDER, FOR SOLUTION INTRAMUSCULAR; INTRAVENOUS at 21:10

## 2019-06-03 RX ADMIN — IOPAMIDOL 75 ML: 755 INJECTION, SOLUTION INTRAVENOUS at 19:24

## 2019-06-03 RX ADMIN — IPRATROPIUM BROMIDE AND ALBUTEROL SULFATE 1 AMPULE: .5; 3 SOLUTION RESPIRATORY (INHALATION) at 19:33

## 2019-06-03 RX ADMIN — SODIUM CHLORIDE 1000 ML: 9 INJECTION, SOLUTION INTRAVENOUS at 18:57

## 2019-06-03 RX ADMIN — NITROGLYCERIN 0.5 INCH: 20 OINTMENT TOPICAL at 21:11

## 2019-06-03 RX ADMIN — VANCOMYCIN HYDROCHLORIDE 750 MG: 10 INJECTION, POWDER, LYOPHILIZED, FOR SOLUTION INTRAVENOUS at 23:20

## 2019-06-03 RX ADMIN — FUROSEMIDE 40 MG: 10 INJECTION, SOLUTION INTRAMUSCULAR; INTRAVENOUS at 21:10

## 2019-06-03 RX ADMIN — SODIUM CHLORIDE 250 ML: 9 INJECTION, SOLUTION INTRAVENOUS at 23:27

## 2019-06-03 RX ADMIN — ACETAMINOPHEN 650 MG: 325 TABLET ORAL at 23:28

## 2019-06-03 RX ADMIN — METHYLPREDNISOLONE SODIUM SUCCINATE 80 MG: 125 INJECTION, POWDER, FOR SOLUTION INTRAMUSCULAR; INTRAVENOUS at 19:00

## 2019-06-03 ASSESSMENT — ENCOUNTER SYMPTOMS
SHORTNESS OF BREATH: 1
ABDOMINAL PAIN: 0
BACK PAIN: 0
WHEEZING: 0
NAUSEA: 0
VOMITING: 0
COUGH: 1
DIARRHEA: 0
COLOR CHANGE: 0

## 2019-06-03 ASSESSMENT — PAIN SCALES - GENERAL
PAINLEVEL_OUTOF10: 5
PAINLEVEL_OUTOF10: 0

## 2019-06-03 NOTE — ED PROVIDER NOTES
complains of cough, congestion and shortness of breath for the past week. Cardiovascular: Negative for chest pain. Gastrointestinal: Negative for abdominal pain, diarrhea, nausea and vomiting. Genitourinary: Negative for difficulty urinating and dysuria. Musculoskeletal: Negative for back pain. Skin: Negative for color change. Neurological: Negative for weakness, numbness and headaches. Positives and Pertinent negatives as per HPI. Except as noted abovein the ROS, all other systems were reviewed and negative.        PAST MEDICAL HISTORY     Past Medical History:   Diagnosis Date    CAD (coronary artery disease)     [facility]     Depression     [facility]     GERD (gastroesophageal reflux disease)     Hyperlipidemia     Hypertension     Mood disorder [facility]     Osteoarthritis     Osteoporosis     Spondylolysis          SURGICAL HISTORY     Past Surgical History:   Procedure Laterality Date    BLEPHAROPLASTY Bilateral     EYE SURGERY Bilateral     cataracts         CURRENTMEDICATIONS       Previous Medications    AMLODIPINE (NORVASC) 10 MG TABLET    Take 1 tablet by mouth daily    ASPIRIN 81 MG TABLET    Take 81 mg by mouth daily    CALCIUM CITRATE-VITAMIN D (CITRICAL + D) 315-250 MG-UNIT TABS PER TABLET    Take 1 tablet by mouth daily (with breakfast)    CARVEDILOL (COREG) 3.125 MG TABLET    Take 1 tablet by mouth 2 times daily (with meals)    DESVENLAFAXINE SUCCINATE (PRISTIQ) 100 MG TB24 EXTENDED RELEASE TABLET    Take by mouth every other day    DOCUSATE SODIUM (COLACE) 100 MG CAPSULE    Take 100 mg by mouth daily    FERROUS SULFATE 325 (65 FE) MG TABLET    Take 325 mg by mouth daily (with breakfast)    FLUTICASONE (FLONASE) 50 MCG/ACT NASAL SPRAY    1 spray by Each Nare route daily as needed for Rhinitis    IBUPROFEN (ADVIL;MOTRIN) 400 MG TABLET    Take 400 mg by mouth every 4 hours as needed for Pain    IPRATROPIUM-ALBUTEROL (DUONEB) 0.5-2.5 (3) MG/3ML SOLN NEBULIZER SOLUTION    Inhale 3 mLs into the lungs every 4 hours    LACTULOSE (CHRONULAC) 10 GM/15ML SOLUTION    Take 20 g by mouth daily    MELATONIN 3 MG TABS TABLET    Take 3 mg by mouth daily Give 1 and 1/2 tablet    MULTIPLE VITAMINS-MINERALS (PRESERVISION AREDS PO)    Take by mouth daily    NICOTINE POLACRILEX (NICORETTE) 2 MG LOZENGE    Take 2 mg by mouth as needed for Smoking cessation    POLYETHYLENE GLYCOL (GLYCOLAX) POWDER    Take 17 g by mouth daily as needed    ROSUVASTATIN (CRESTOR) 5 MG TABLET    Take 5 mg by mouth nightly         ALLERGIES     Amoxicillin-pot clavulanate    FAMILYHISTORY       Family History   Problem Relation Age of Onset    Diabetes Mother     Elevated Lipids Father     Diabetes Son           SOCIAL HISTORY       Social History     Socioeconomic History    Marital status:       Spouse name: None    Number of children: None    Years of education: None    Highest education level: None   Occupational History    None   Social Needs    Financial resource strain: None    Food insecurity:     Worry: None     Inability: None    Transportation needs:     Medical: None     Non-medical: None   Tobacco Use    Smoking status: Former Smoker    Smokeless tobacco: Never Used   Substance and Sexual Activity    Alcohol use: No    Drug use: None    Sexual activity: None   Lifestyle    Physical activity:     Days per week: None     Minutes per session: None    Stress: None   Relationships    Social connections:     Talks on phone: None     Gets together: None     Attends Anglican service: None     Active member of club or organization: None     Attends meetings of clubs or organizations: None     Relationship status: None    Intimate partner violence:     Fear of current or ex partner: None     Emotionally abused: None     Physically abused: None     Forced sexual activity: None   Other Topics Concern    None   Social History Narrative    None       SCREENINGS             PHYSICAL EXAM    (up to 7 for level 4, 8 or more for level 5)     ED Triage Vitals [06/03/19 1626]   BP Temp Temp Source Pulse Resp SpO2 Height Weight   101/63 98.2 °F (36.8 °C) Oral 100 18 96 % 5' 4\" (1.626 m) 126 lb (57.2 kg)       Physical Exam   Constitutional: She is oriented to person, place, and time. She appears well-developed and well-nourished. HENT:   Head: Normocephalic. Right Ear: External ear normal.   Left Ear: External ear normal.   Mouth/Throat: Oropharynx is clear and moist.   Eyes: Right eye exhibits no discharge. Left eye exhibits no discharge. No scleral icterus. Neck: Normal range of motion. Neck supple. Pulmonary/Chest: Effort normal. No respiratory distress. She has wheezes. Airway patent with symmetric rise and fall chest, lungs are clear anteriorly, posteriorly the patient has inspiratory wheezing and diminished in the bases. She is not tachypneic or dyspneic, saturations are in the 90s, she was placed on nasal cannula oxygen improved to 94%. Abdominal: Soft. Bowel sounds are normal. There is no tenderness. Musculoskeletal: Normal range of motion. Neurological: She is alert and oriented to person, place, and time. GCS eye subscore is 4. GCS verbal subscore is 5. GCS motor subscore is 6. Skin: Skin is warm. Capillary refill takes less than 2 seconds. She is not diaphoretic. No pallor. Psychiatric: She has a normal mood and affect. Her behavior is normal.   Nursing note and vitals reviewed.       DIAGNOSTIC RESULTS   LABS:    Labs Reviewed   CBC WITH AUTO DIFFERENTIAL - Abnormal; Notable for the following components:       Result Value    RBC 3.32 (*)     Hemoglobin 11.1 (*)     Hematocrit 33.1 (*)     Monocytes # 1.4 (*)     All other components within normal limits    Narrative:     Performed at:  [address],  [place], [address] [place] [address]   Phone [phone]   COMPREHENSIVE METABOLIC PANEL - Abnormal; Notable for the following components:    Glucose 147 (*)     BUN 34 (*)     GFR Non-African American 59 (*)     Albumin/Globulin Ratio 0.9 (*)     All other components within normal limits    Narrative:     Performed at:  [place] [address] [email or web address] Ã¨â€¹Â±Ã¨â€¦Â¾Ã§Â½â€˜   Phone [phone] PEPTIDE - Abnormal; Notable for the following components:    Pro-BNP 4,336 (*)     All other components within normal limits    Narrative:     Performed at:  [place] [address] [email or web address] Ã¨â€¹Â±Ã¨â€¦Â¾Ã§Â½â€˜   Phone [phone]   CULTURE BLOOD #2   CULTURE BLOOD #1   TROPONIN    Narrative:     Performed at:  [place] [email or web address] Ã¨â€¹Â±Ã¨â€¦Â¾Ã§Â½â€˜   Phone [phone]   LACTIC ACID, PLASMA    Narrative:     Performed at:  [place] [address] [email or web address] Ã¨â€¹Â±Ã¨â€¦Â¾Ã§Â½â€˜   Phone [phone]   URINE RT REFLEX TO CULTURE       All other labs were within normal range or not returned as of this dictation. EKG: All EKG's are interpreted by the Emergency Department Physician who either signs orCo-signs this chart in the absence of a cardiologist.  Please see their note for interpretation of EKG. RADIOLOGY:   Non-plain film images such as CT, Ultrasound and MRI are read by the radiologist. Plain radiographic images are visualized andpreliminarily interpreted by the  ED Provider with the below findings:        Interpretation perthe Radiologist below, if available at the time of this note:    CT CHEST PULMONARY EMBOLISM W CONTRAST   Final Result   1. Negative CT angiogram for acute pulmonary embolism. 2. Bronchial wall thickening within the right lower lobe bronchi which could   indicate a bronchitis or sequelae of aspiration. There is however no   pneumonia evident. 3. Small bilateral pleural effusions and atelectasis within the lower lobes. 4. Age indeterminate but likely late subacute/early chronic T7 compression   fracture. XR CHEST PORTABLE   Final Result   Findings suggest congestive heart failure               PROCEDURES   Unless otherwise noted below, none     Procedures    CRITICAL CARE TIME   N/A    CONSULTS:  IP CONSULT TO HOSPITALIST  IP CONSULT TO DIETITIAN      EMERGENCY DEPARTMENT COURSE and DIFFERENTIALDIAGNOSIS/MDM:   Vitals:    Vitals:    06/03/19 1626 06/03/19 1814 06/03/19 1935 06/03/19 1936   BP: 101/63 139/71     Pulse: 100 108     Resp: 18 24  21   Temp: 98.2 °F (36.8 °C) 99.4 °F (37.4 °C)     TempSrc: Oral Oral     SpO2: 96% 96% 96%    Weight: 126 lb (57.2 kg)      Height: 5' 4\" (1.626 m)          Patient was given thefollowing medications:  Medications   azithromycin (ZITHROMAX) 500 mg in D5W 250ml addavial (has no administration in time range)     And   cefTRIAXone (ROCEPHIN) 1 g IVPB in 50 mL D5W minibag (1 g Intravenous New Bag 6/3/19 2110)   vancomycin (VANCOCIN) 750 mg in dextrose 5 % 250 mL IVPB (has no administration in time range)   0.9 % sodium chloride bolus (1,000 mLs Intravenous New Bag 6/3/19 1857)   ipratropium-albuterol (DUONEB) nebulizer solution 1 ampule (1 ampule Inhalation Given 6/3/19 1933)   methylPREDNISolone sodium (SOLU-MEDROL) injection 80 mg (80 mg Intravenous Given 6/3/19 1900)   iopamidol (ISOVUE-370) 76 % injection 75 mL (75 mLs Intravenous Given 6/3/19 1924)   furosemide (LASIX) injection 40 mg (40 mg Intravenous Given 6/3/19 2110)   nitroglycerin (NITRO-BID) 2 % ointment 0.5 inch (0.5 inches Topical Given 6/3/19 2111)       Patient presents today with shortness of breath. She is from [facility], was at the facility when the [facility] physician saw her today and was concerned about an arrhythmia and difficulty breathing.   However, the son is at the bedside, informs the patient has a cough, congestion and shortness of breath for the past week, he does not recall the patient having any fever, chills, nausea, vomiting or diarrhea. She is not tachypneic or dyspneic, saturations are in the 90s, she was placed on nasal cannula oxygen improved to 94%. After evaluation and examination patient IV access, blood work, chest x-ray, EKG were obtained, patient had an outpatient chest x-ray per the son and it was negative, I ordered a CT scan of the chest along with DuoNeb nebulizers. CBC shows no significant sepsis or acute anemia requiring blood transfusion. Metabolic panel shows no electrolyte disturbances or renal insufficiency. Liver functions are normal.  Troponin is negative. BNP is 4336. Lactic acid is 1.3. Initial chest x-ray shows consistent of congestive heart failure. The attending physician or the patient's Lasix and nitro. However, CT of the chest shows a negative angiogram for acute pulmonary emboli however the patient does have bronchial wall thickening within the right lower lobe concerning for aspiration but they are stating no pneumonia, because the patient's history of pneumonia and shortness of breath with cough and congestion I ordered empiric antibiotic treatment for hospital-acquired pneumonia as the patient resides in a nursing facility. However, because the patient's concerning for pneumonia, shortness of breath and CHF exacerbation I spoke with the attending physician about admitting the patient to hospital.  Hospitalist paged for admission. The attending physician spoke with the hospitalist on call, they discussed the patient's case at length and he agreed to accept the patient for admission. Patient to be admitted to hospital for further evaluation management of care. FINAL IMPRESSION      1. Congestive heart failure, unspecified HF chronicity, unspecified heart failure type ([address]    2. Hospital-acquired pneumonia    3. Pneumonia of right lower lobe due to infectious organism ([address]    4. Shortness of breath    5.  Cough          DISPOSITION/PLAN   DISPOSITION

## 2019-06-03 NOTE — ED PROVIDER NOTES
I independently performed a history and physical on Nataly Dominguez. All diagnostic, treatment, and disposition decisions were made by myself in conjunction with the advanced practice provider. For further details of Nataly Dominguez's emergency department encounter, please see Cari Carolina NP's documentation. Patient is an 59-year-old female presenting today due to concern for possible pneumonia from a nursing home. No history of heart failure per her son. She does not normally wear oxygen. She also had hypotension along with tachycardia at the nursing home which prompted that physician to send her to the emergency department. She denies any headache or abdominal pain. No chest pain. She did recently break her left patella and has been in a wheelchair for the last 7 weeks per her son. No other concerns at this time. Physical:   Gen: Mild acute distress. AOx3. Pysch: Depressed mood and affect  HEENT: NCAT, PERRL, MMM  Neck: supple  Cardiac: regular rate, Irregularly irregular rhythm, pulses 2+ in upper extremities  Lungs: Diminished lung sounds throughout all lung fields with scattered rhonchi throughout and decreased more at the bases   Abdomen: soft and nontender with no R/D/G  Neuro: no focal neuro deficits with strength 5/5 in all 4 extremities    The Ekg interpreted by me shows  atrial fibrillation with a rate of 99  Axis is   Normal  QTc is  normal  Intervals and Durations are unremarkable. ST Segments: nonspecific changes  Significant change from prior EKG dated - 1/8/19  No STEMI, now in afib       MDM:  Patient was evaluated due to concern for worsening shortness of breath for the past few days along with hypotension. CT scan was concerning for congestive heart failure and therefore she was treated with nitro and Lasix. No pneumonia on CT but possible infectious bronchitis or aspiration. She was treated with antibiotics for this.   Otherwise, she will need further evaluation the hospital with cardiology consultation. She is stable for the floor with telemetry. Troponin was negative and story not suggestive of acute coronary syndrome. No sign of pulmonary embolism on CT scan. Liliana Rodarte MD  06/03/19 2035    Spoke with Dr. Jazmine Chua at 2055 for admission.         Liliana Rodarte MD  06/03/19 2141

## 2019-06-03 NOTE — ED NOTES
Bed: 16  Expected date:   Expected time:   Means of arrival:   Comments:  lynnette Lisa Mc, RN  06/03/19 6942

## 2019-06-03 NOTE — LETTER
Beneficiary Notification Letter     This Elias Marcos Provider is Participating in an Innovative Payment and [address] [place] [address] [place] from Medicare     Greetings:   [address] Elias Callahan is participating in a Medicare initiative called the [facility] for [facility]. You are receiving this letter because your health care provider has identified you as a patient who is receiving care through this initiative. Health care providers participating in the [facility], including Zeke Callahan, will work with Medicare to improve care for patients. Your Medicare rights have not been changed. You still have all the same Medicare rights and protections, including the right to choose which hospital, doctor, or other health care provider you see. However, because Zeke Callahan chose to participate in the [address] [place] [address], all Medicare beneficiaries who meet the eligibility criteria of this initiative will receive care under the initiative. If you do not wish to receive care under the [employer] [facility], you must choose a health care provider that does not participate in this initiative for your care. Regardless of which health care provider you see, Medicare will continue to cover all of your medically necessary services. Bundled Payments for Care Improvement Advanced aims to help improve your care     The [employer] [facility] [address] [facility] is an innovative Medicare initiative that encourages your doctors, hospitals, and other health care providers to work more closely together so you get better care during and following certain hospital stays.  In this initiative, doctors and hospitals may work closely with certain health care providers and suppliers that help patients recover after discharge from the hospital, · To find a different doctor, visit Medicare's Physician Compare website, HD[employer], or call 1-800-MEDICARE (983 0041). TTY users should call 2-121.842.9008. · To find a different skilled nursing facility, visit [facility] Medico website, https://www.[employer]/, or call 1-800-MEDICARE (1- 738.775.1401). TTY users should call 3-815.849.4764. · To find a different long term care hospital, visit [facility] Compare website, [employer], or call 1-800- MEDICARE (285 8340). TTY users should call 9-586.611.4133. · To find a different inpatient rehabilitation facility, visit [facility] Compare website, www.medicare.gov/ inpatientrehabilitation facilitycompare, or call 1-800-MEDICARE (1-695.852.6952). TTY users should call 2- 613.896.4980. · To find a different home health agency, visit [address] [facility] website, www.medicare.gov/homehealthcompare, or call 1-800-MEDICARE (7-646- 325-5873). TTY users should call 8-291.418.5059.

## 2019-06-04 LAB
ANION GAP SERPL CALCULATED.3IONS-SCNC: 12 MMOL/L (ref 3–16)
BUN BLDV-MCNC: 29 MG/DL (ref 7–20)
CALCIUM SERPL-MCNC: 8.7 MG/DL (ref 8.3–10.6)
CHLORIDE BLD-SCNC: 99 MMOL/L (ref 99–110)
CHOLESTEROL, TOTAL: 109 MG/DL (ref 0–199)
CO2: 26 MMOL/L (ref 21–32)
CREAT SERPL-MCNC: 0.8 MG/DL (ref 0.6–1.2)
GFR AFRICAN AMERICAN: >60
GFR NON-AFRICAN AMERICAN: >60
GLUCOSE BLD-MCNC: 259 MG/DL (ref 70–99)
HDLC SERPL-MCNC: 42 MG/DL (ref 40–60)
LDL CHOLESTEROL CALCULATED: 53 MG/DL
MAGNESIUM: 2.2 MG/DL (ref 1.8–2.4)
POTASSIUM SERPL-SCNC: 3.9 MMOL/L (ref 3.5–5.1)
SODIUM BLD-SCNC: 137 MMOL/L (ref 136–145)
TRIGL SERPL-MCNC: 70 MG/DL (ref 0–150)
TSH REFLEX: 1.59 UIU/ML (ref 0.27–4.2)
VLDLC SERPL CALC-MCNC: 14 MG/DL

## 2019-06-04 PROCEDURE — 97535 SELF CARE MNGMENT TRAINING: CPT

## 2019-06-04 PROCEDURE — 6370000000 HC RX 637 (ALT 250 FOR IP): Performed by: INTERNAL MEDICINE

## 2019-06-04 PROCEDURE — 6360000002 HC RX W HCPCS: Performed by: INTERNAL MEDICINE

## 2019-06-04 PROCEDURE — 97530 THERAPEUTIC ACTIVITIES: CPT

## 2019-06-04 PROCEDURE — 92610 EVALUATE SWALLOWING FUNCTION: CPT

## 2019-06-04 PROCEDURE — 84443 ASSAY THYROID STIM HORMONE: CPT

## 2019-06-04 PROCEDURE — 80048 BASIC METABOLIC PNL TOTAL CA: CPT

## 2019-06-04 PROCEDURE — 94640 AIRWAY INHALATION TREATMENT: CPT

## 2019-06-04 PROCEDURE — 6360000002 HC RX W HCPCS: Performed by: NURSE PRACTITIONER

## 2019-06-04 PROCEDURE — 80061 LIPID PANEL: CPT

## 2019-06-04 PROCEDURE — 2700000000 HC OXYGEN THERAPY PER DAY

## 2019-06-04 PROCEDURE — 97162 PT EVAL MOD COMPLEX 30 MIN: CPT

## 2019-06-04 PROCEDURE — 2580000003 HC RX 258: Performed by: INTERNAL MEDICINE

## 2019-06-04 PROCEDURE — 96376 TX/PRO/DX INJ SAME DRUG ADON: CPT

## 2019-06-04 PROCEDURE — 92526 ORAL FUNCTION THERAPY: CPT

## 2019-06-04 PROCEDURE — 96367 TX/PROPH/DG ADDL SEQ IV INF: CPT

## 2019-06-04 PROCEDURE — 97166 OT EVAL MOD COMPLEX 45 MIN: CPT

## 2019-06-04 PROCEDURE — G0378 HOSPITAL OBSERVATION PER HR: HCPCS

## 2019-06-04 PROCEDURE — 36415 COLL VENOUS BLD VENIPUNCTURE: CPT

## 2019-06-04 PROCEDURE — 96372 THER/PROPH/DIAG INJ SC/IM: CPT

## 2019-06-04 PROCEDURE — 94761 N-INVAS EAR/PLS OXIMETRY MLT: CPT

## 2019-06-04 PROCEDURE — 2580000003 HC RX 258: Performed by: NURSE PRACTITIONER

## 2019-06-04 PROCEDURE — 83735 ASSAY OF MAGNESIUM: CPT

## 2019-06-04 RX ORDER — HALOPERIDOL 5 MG/ML
5 INJECTION INTRAMUSCULAR ONCE
Status: COMPLETED | OUTPATIENT
Start: 2019-06-04 | End: 2019-06-04

## 2019-06-04 RX ADMIN — IPRATROPIUM BROMIDE AND ALBUTEROL SULFATE 3 ML: .5; 3 SOLUTION RESPIRATORY (INHALATION) at 07:19

## 2019-06-04 RX ADMIN — Medication 10 ML: at 21:20

## 2019-06-04 RX ADMIN — CARVEDILOL 3.12 MG: 3.12 TABLET, FILM COATED ORAL at 17:53

## 2019-06-04 RX ADMIN — AZITHROMYCIN MONOHYDRATE 500 MG: 500 INJECTION, POWDER, LYOPHILIZED, FOR SOLUTION INTRAVENOUS at 00:36

## 2019-06-04 RX ADMIN — IPRATROPIUM BROMIDE AND ALBUTEROL SULFATE 3 ML: .5; 3 SOLUTION RESPIRATORY (INHALATION) at 15:22

## 2019-06-04 RX ADMIN — FUROSEMIDE 40 MG: 10 INJECTION, SOLUTION INTRAMUSCULAR; INTRAVENOUS at 21:20

## 2019-06-04 RX ADMIN — IPRATROPIUM BROMIDE AND ALBUTEROL SULFATE 3 ML: .5; 3 SOLUTION RESPIRATORY (INHALATION) at 20:27

## 2019-06-04 RX ADMIN — ENOXAPARIN SODIUM 40 MG: 40 INJECTION SUBCUTANEOUS at 08:38

## 2019-06-04 RX ADMIN — Medication 10 ML: at 08:39

## 2019-06-04 RX ADMIN — IPRATROPIUM BROMIDE AND ALBUTEROL SULFATE 3 ML: .5; 3 SOLUTION RESPIRATORY (INHALATION) at 11:37

## 2019-06-04 RX ADMIN — ASPIRIN 81 MG: 81 TABLET, COATED ORAL at 09:52

## 2019-06-04 RX ADMIN — CARVEDILOL 3.12 MG: 3.12 TABLET, FILM COATED ORAL at 09:52

## 2019-06-04 RX ADMIN — Medication 6 MG: at 00:37

## 2019-06-04 RX ADMIN — HALOPERIDOL LACTATE 5 MG: 5 INJECTION INTRAMUSCULAR at 22:36

## 2019-06-04 RX ADMIN — FUROSEMIDE 40 MG: 10 INJECTION, SOLUTION INTRAMUSCULAR; INTRAVENOUS at 08:38

## 2019-06-04 NOTE — H&P
Hospital Medicine History & Physical      PCP: Mitzy Larson    Date of Admission: 6/3/2019    Date of Service: Pt seen/examined on 6.4.19 and Placed in Observation. Chief Complaint:  SOB      History Of Present Illness:    80 y.o. female who presented to [facility] with shortness of breath. She is a poor historian. PMHx sig for blindness, COPD, HTN, CAD. She comes from [facility][note 3]. She was sent for acute dyspnea, but she says she has been short of breath for months and her son says she has been short of breath for a week. She endorses a cough, chest pain, abdominal pain, and nausea, but no vomiting. She fractured her patella earlier this spring and has been in a wheelchair for several weeks. In the ED, she was reported to be hypoxic on admission, and was placed on supplemental O2. CT PE was negative for PE and negative for pneumonia, but suggested possible bronchitis or an aspiration event. Past Medical History:          Diagnosis Date    CAD (coronary artery disease)     [facility]     Depression     Diabetes mellitus ([facility]     GERD (gastroesophageal reflux disease)     Hyperlipidemia     Hypertension     Mood disorder ([facility]     Osteoarthritis     Osteoporosis     Spondylolysis        Past Surgical History:          Procedure Laterality Date    BLEPHAROPLASTY Bilateral     EYE SURGERY Bilateral     cataracts       Medications Prior to Admission:      Prior to Admission medications    Medication Sig Start Date End Date Taking?  Authorizing Provider   ipratropium-albuterol (DUONEB) 0.5-2.5 (3) MG/3ML SOLN nebulizer solution Inhale 3 mLs into the lungs every 4 hours 1/12/19  Yes Geremias Kitchen MD   aspirin 81 MG tablet Take 81 mg by mouth daily   Yes Historical Provider, MD   ibuprofen (ADVIL;MOTRIN) 400 MG tablet Take 400 mg by mouth every 4 hours as needed for Pain   Yes Historical Provider, MD   melatonin 3 MG TABS tablet Take 3 mg by mouth daily Give 1 and 1/2 tablet   Yes Historical Provider, MD   Multiple Vitamins-Minerals (PRESERVISION AREDS PO) Take by mouth daily   Yes Historical Provider, MD   carvedilol (COREG) 3.125 MG tablet Take 1 tablet by mouth 2 times daily (with meals) 1/12/19   Beryle Hickman, MD   amLODIPine (NORVASC) 10 MG tablet Take 1 tablet by mouth daily 1/13/19   Beryle Hickman, MD   calcium citrate-vitamin D (CITRICAL + D) 315-250 MG-UNIT TABS per tablet Take 1 tablet by mouth daily (with breakfast)    Historical Provider, MD   docusate sodium (COLACE) 100 MG capsule Take 100 mg by mouth daily    Historical Provider, MD   ferrous sulfate 325 (65 Fe) MG tablet Take 325 mg by mouth daily (with breakfast)    Historical Provider, MD   fluticasone (FLONASE) 50 MCG/ACT nasal spray 1 spray by Each Nare route daily as needed for Rhinitis    Historical Provider, MD   lactulose (CHRONULAC) 10 GM/15ML solution Take 20 g by mouth daily    Historical Provider, MD   polyethylene glycol (GLYCOLAX) powder Take 17 g by mouth daily as needed    Historical Provider, MD   nicotine polacrilex (NICORETTE) 2 MG lozenge Take 2 mg by mouth as needed for Smoking cessation    Historical Provider, MD   desvenlafaxine succinate (PRISTIQ) 100 MG TB24 extended release tablet Take by mouth every other day    Historical Provider, MD   rosuvastatin (CRESTOR) 5 MG tablet Take 5 mg by mouth nightly    Historical Provider, MD       Allergies:  Amoxicillin-pot clavulanate    Social History:      The patient currently lives in an [facility]    TOBACCO:   reports that she has quit smoking. She has never used smokeless tobacco.  ETOH:   reports that she does not drink alcohol. Family History:       Positive as follows: \"heart problems\"        Problem Relation Age of Onset    Diabetes Mother     Elevated Lipids Father     Diabetes Son        REVIEW OF SYSTEMS:   Pertinent positives as noted in the HPI. All other systems reviewed and negative.     PHYSICAL EXAM PERFORMED:    /87   Pulse 104   Temp 98.1 °F (36.7 °C) (Oral)   Resp 20   Ht 5' 4\" (1.626 m)   Wt 130 lb 8.2 oz (59.2 kg)   SpO2 95%   BMI 22.40 kg/m²     General appearance:  No apparent distress, resting, cooperative. HEENT:  Normal cephalic, atraumatic without obvious deformity. Conjunctivae/corneas clear. Respiratory:  Normal respiratory effort. Clear to auscultation, bilaterally without Rales/Wheezes/Rhonchi. Upper airway noises present. Cardiovascular:  Regular rate and rhythm with normal S1/S2 without murmurs, rubs or gallops. Abdomen: Soft, non-tender, non-distended with normal bowel sounds. Musculoskeletal:  +1 pitting edema BLLE  Skin: Skin color, texture, turgor normal.  Warm and dry  Neurologic:  Neurovascularly intact without any focal sensory/motor deficits. Cranial nerves: II-XII intact, grossly non-focal.  Psychiatric:  Alert, answers most questions appropriately. Somewhat confused about where she is and timelines. Capillary Refill: Brisk,< 3 seconds   Peripheral Pulses: +2 palpable, equal bilaterally       Labs:     Recent Labs     06/03/19  1750   WBC 9.6   HGB 11.1*   HCT 33.1*        Recent Labs     06/03/19  1750 06/04/19  0429    137   K 4.0 3.9   CL 99 99   CO2 25 26   BUN 34* 29*   CREATININE 0.9 0.8   CALCIUM 9.2 8.7     Recent Labs     06/03/19  1750   AST 17   ALT 15   BILITOT 0.3   ALKPHOS 77     No results for input(s): INR in the last 72 hours. Recent Labs     06/03/19  1750   TROPONINI <0.01       Urinalysis:      Lab Results   Component Value Date    NITRU Negative 06/03/2019    WBCUA 6-10 11/30/2018    BACTERIA 4+ 11/30/2018    RBCUA 3-5 11/30/2018    BLOODU Negative 06/03/2019    SPECGRAV <=1.005 06/03/2019    GLUCOSEU Negative 06/03/2019       Radiology:   EKG:  I have reviewed the EKG with the following interpretation: HR 99, Irregular rhythm with some evidence of p waves. CT CHEST PULMONARY EMBOLISM W CONTRAST   Final Result   1.  Negative CT angiogram for acute pulmonary embolism. 2. Bronchial wall thickening within the right lower lobe bronchi which could   indicate a bronchitis or sequelae of aspiration. There is however no   pneumonia evident. 3. Small bilateral pleural effusions and atelectasis within the lower lobes. 4. Age indeterminate but likely late subacute/early chronic T7 compression   fracture. XR CHEST PORTABLE   Final Result   Findings suggest congestive heart failure             ASSESSMENT/PLAN:    Active Hospital Problems    Diagnosis Date Noted    CHF (congestive heart failure), NYHA class I, acute on chronic, combined ([facility] [I50.43] 06/03/2019       Suspected CHF  -lasix BID  -Ins and Out, daily weights  -check echocardiogram  -cardiac diet when cleared by SLP    Possible aspiration, no evidence of PNA  -keep NPO  -SLP eval    Hx COPD  -continue duonebs     Hx HTN  -BP soft on admission, home meds held, continue lasix      DVT Prophylaxis: lovenox  Diet: Diet NPO Effective Now Exceptions are: Sips with Meds, Ice Chips  Code Status: Full Code    PT/OT Eval Status: pending    Dispo - anticipate DC 6/6       Joshua Sepulveda MD    Thank you Crystal Newman for the opportunity to be involved in this patient's care. If you have any questions or concerns please feel free to contact me at [phone].

## 2019-06-04 NOTE — CARE COORDINATION
CASE MANAGEMENT INITIAL ASSESSMENT      Reviewed chart and met with patient today, re: Triggered by patient lives in assisted living section of [facility], age and diagnosis  Explained Case Management role/services. To patient, although  poor historian and confused therefore spoke with son Aaron Thibodeaux over the phone    Family present: None  Primary contact information: [place]    Admit date/status: observation 6/3/19  Diagnosis: CHF    Insurance: Medicare [address] required for SNF - N        3 night stay required - Y    Living arrangements, Adls, care needs, prior to admission: Lives in assisted living section of [facility] it is not an ECF. Transportation: tbd    [employer] at home: Defer to assisted living at [facility] in the home and/or outpatient, prior to admission: Lives in assisted living    PT/OT recs: ECF with PT/OT    Hospital Exemption Notification (HEN): n/a    Barriers to discharge: Needs 3 nights as an inpatient to return to [facility] skilled but if she does not convert to in patient they will still take her back    Plan/comments: To return to [facility] assisted living verses [facility]. Will monitor and assist with getting patient back to [facility] when medically stable.      ECOC on chart for MD signature

## 2019-06-04 NOTE — PROGRESS NOTES
Speech Language Pathology  Attempt Evaluation Note    SLP reviewed pt's chart and spoke with RN in attempt to see pt for dysphagia evaluation. PCA's just entering room to change/clean pt. SLP will continue to follow and will re-attempt later this morning. RN informed. Thank you. Panda TURPIN [facility]-SLP  Speech-Language Pathologist  [phone]

## 2019-06-04 NOTE — PROGRESS NOTES
Nutrition Assessment    Type and Reason for Visit: Initial, Consult    Nutrition Recommendations:   1. Dental soft  2. Ensure at breakfast, Magic cup with lunch and dinner  3. Will monitor nutritional adequacy, nutrition-related labs, weights, BMs, and clinical progress     Nutrition Assessment: Nutritional status at risk for decline due to mild oral and pharyngeal dysphagia post swallow evaluation. Will add Ensure at breakfast and Magic cup with lunch and dinner (great for medications since required in puree). Will continue to monitor for any nutritional interventions. Malnutrition Assessment:  · Malnutrition Status: At risk for malnutrition    Nutrition Risk Level: Moderate    Nutrient Needs:  · Estimated Daily Total Kcal: 5744-3429  · Estimated Daily Protein (g): 71-82  · Estimated Daily Total Fluid (ml/day): 1 kcal/ml    Nutrition Diagnosis:   · Problem: Increased nutrient needs  · Etiology: related to Increased demand for energy/nutrients, Difficulty swallowing     Signs and symptoms:  as evidenced by Swallow study results(at risk for skin breakdown)    Objective Information:  · Nutrition-Focused Physical Findings: total assist needed with meals per speech therapy  · Wound Type: (bruising on coccyx )  · Current Nutrition Therapies:  · Oral Diet Orders: Dental Soft   · Oral Diet intake: Unable to assess  · Oral Nutrition Supplement (ONS) Orders: None  · ONS intake: Unable to assess  · Anthropometric Measures:  · Ht: 5' 4\" (162.6 cm)   · Current Body Wt: 130 lb 8 oz (59.2 kg)  · Ideal Body Wt: 120 lb (54.4 kg), % Ideal Body    · BMI Classification: BMI 35.0 - 39.9 Obese Class II    Nutrition Interventions:   Continue current diet, Start ONS  Education not appropriate at this time    Nutrition Evaluation:   · Evaluation: Goals set   · Goals: Patient will eat 50% or greater of meals and supplements without signs of aspiration.       · Monitoring: Meal Intake, Supplement Intake, Pertinent Labs, Chewing/Swallowing, Mental Status/Confusion      Electronically signed by Tha Connolly, [address] [place] [address],  on 6/4/19 at 11:56 AM    Contact Number: Office: [phone]; [address]: [phone]

## 2019-06-04 NOTE — PROGRESS NOTES
RESPIRATORY THERAPY ASSESSMENT    Name:  Russell Seip  Medical Record Number:  [id number]  Age: 80 y.o. Gender: female  : 1930  Today's Date:  6/3/2019  Room:  [address] [place] [address]-    Assessment     Is the patient being admitted for a COPD or Asthma exacerbation? No   (If yes the patient will be seen every 4 hours for the first 24 hours and then reassessed)    Patient Admission Diagnosis      Allergies  Allergies   Allergen Reactions    Amoxicillin-Pot Clavulanate        Minimum Predicted Vital Capacity:     821          Actual Vital Capacity:      750              Pulmonary History:COPD and former smoker   Home Oxygen Therapy:  room air  Home Respiratory Therapy:Albuterol/Ipratropium Bromide HHN/pt unsure of home reg, duoneb per chart  Current Respiratory Therapy:  duoneb q4prn  Treatment Type: IS  Medications: Albuterol/Ipratropium    Respiratory Severity Index(RSI)   Patients with orders for inhalation medications, oxygen, or any therapeutic treatment modality will be placed on Respiratory Protocol. They will be assessed with the first treatment and at least every 72 hours thereafter. The following severity scale will be used to determine frequency of treatment intervention.     Smoking History: Pulmonary Disease or Smoking History, Greater than 15 pack year = 2    Social History  Social History     Tobacco Use    Smoking status: Former Smoker    Smokeless tobacco: Never Used   Substance Use Topics    Alcohol use: No    Drug use: Not on file       Recent Surgical History: None = 0  Past Surgical History  Past Surgical History:   Procedure Laterality Date    BLEPHAROPLASTY Bilateral     EYE SURGERY Bilateral     cataracts       Level of Consciousness: Alert, Follows Commands but Disoriented = 1    Level of Activity: Mostly sedentary, minimal walking = 2    Respiratory Pattern: Dyspnea with exertion;Irregular pattern;or RR less than 6 = 2    Breath Sounds: Diminshed bilaterally and/or crackles = 2    Sputum   ,  ,    Cough: Strong, productive = 1    Vital Signs   /71   Pulse 108   Temp 99.4 °F (37.4 °C) (Oral)   Resp 21   Ht 5' 4\" (1.626 m)   Wt 126 lb (57.2 kg)   SpO2 96%   BMI 21.63 kg/m²   SPO2 (COPD values may differ): 86-87% on room air or greater than 92% on FiO2 35- 50% = 3    Peak Flow (asthma only): not applicable = 0    RSI: 90-54 = Q6H or QID and Q4HPRN for dyspnea        Plan       Goals: medication delivery    Patient/caregiver was educated on the proper method of use for Respiratory Care Devices:  Yes      Level of patient/caregiver understanding able to:   ? Verbalize understanding   ? Demonstrate understanding       ? Teach back        ? Needs reinforcement       ? No available caregiver               ? Other:     Response to education:  Sorin Her     Is patient being placed on Home Treatment Regimen? No     Does the patient have everything they need prior to discharge? NA     Comments: pt seen and chart reviewed    Plan of Care: duoneb q4w/a    Electronically signed by Stephanie Og RCP on 6/3/2019 at 11:50 PM    Respiratory Protocol Guidelines     1. Assessment and treatment by Respiratory Therapy will be initiated for medication and therapeutic interventions upon initiation of aerosolized medication. 2. Physician will be contacted for respiratory rate (RR) greater than 35 breaths per minute. Therapy will be held for heart rate (HR) greater than 140 beats per minute, pending direction from physician. 3. Bronchodilators will be administered via Metered Dose Inhaler (MDI) with spacer when the following criteria are met:  a. Alert and cooperative     b. HR < 140 bpm  c. RR < 30 bpm                d. Can demonstrate a 2-3 second inspiratory hold  4. Bronchodilators will be administered via Hand Held Nebulizer [facility]) to patients when ANY of the following criteria are met  a. Incognizant or uncooperative          b. Patients treated with HHN at Home        c.  Unable to demonstrate proper use of MDI with spacer     d. RR > 30 bpm   5. Bronchodilators will be delivered via Metered Dose Inhaler (MDI), HHN, Aerogen to intubated patients on mechanical ventilation. 6. Inhalation medication orders will be delivered and/or substituted as outlined below. Aerosolized Medications Ordering and Administration Guidelines:    1. All Medications will be ordered by a physician, and their frequency and/or modality will be adjusted as defined by the patients Respiratory Severity Index (RSI) score. 2. If the patient does not have documented COPD, consider discontinuing anticholinergics when RSI is less than 9.  3. If the bronchospasm worsens (increased RSI), then the bronchodilator frequency can be increased to a maximum of every 4 hours. If greater than every 4 hours is required, the physician will be contacted. 4. If the bronchospasm improves, the frequency of the bronchodilator can be decreased, based on the patient's RSI, but not less than home treatment regimen frequency. 5. Bronchodilator(s) will be discontinued if patient has a RSI less than 9 and has received no scheduled or as needed treatment for 72  Hrs. Patients Ordered on a Mucolytic Agent:    1. Must always be administered with a bronchodilator. 2. Discontinue if patient experiences worsened bronchospasm, or secretions have lessened to the point that the patient is able to clear them with a cough. Anti-inflammatory and Combination Medications:    1. If the patient lacks prior history of lung disease, is not using inhaled anti-inflammatory medication at home, and lacks wheezing by examination or by history for at least 24 hours, contact physician for possible discontinuation.

## 2019-06-04 NOTE — PROGRESS NOTES
Patient admitted to room 422 from  The ER. Patient oriented to room, call light, bed rails, phone, lights and bathroom. Patient instructed about the schedule of the day including: vital sign frequency, lab draws, possible tests, frequency of MD and staff rounds, including RN/MD rounding together at bedside, daily weights, and I &O's. Patient instructed about prescribed diet, how to use 8MENU, and television. Bed alarm in place, patient aware of placement and reason. Telemetry box # 25 in place, patient aware of placement and reason. Bed locked, in lowest position, side rails up 2/4, call light within reach. Pt is blind according to pt and son. Pt combative with the nurse down in the ER. Pt also noted with dementia and will yell out \"Help Me\". Unable to complete full assessment due to patient being a poor historian and will become agitated easily. Will continue to monitor.

## 2019-06-04 NOTE — PROGRESS NOTES
Physical Therapy    Facility/Department: [facility] C4 PCU  Initial Assessment    NAME: Luis Alberto Key  : 1930  MRN: [id number]    Date of Service: 2019    Discharge Recommendations:  ECF with PT   PT Equipment Recommendations  Equipment Needed: No    Assessment   Body structures, Functions, Activity limitations: Decreased functional mobility ; Decreased strength;Decreased balance  Assessment: The pt is an 80year old female who presented to the ED yesterday 6[facility]3/19 [facility] with shortness of breath and diagnosed with exacerbation of CHF. Pt is a poor historian and not interactive with staff, unable to provide prior level of function. Per chart review, pt is a long-term resident at [facility] and has been using w/c for majority of mobility since sustaining L patellar fx on 19. She currently requires moderate assistance x2 for bed mobility, sit<>stand transfers, and bed<>chair transfer using RW. Pt is limited by impaired cognition, generalized weakness and fatigue, impaired balance, and low activity tolerance. She will benefit from continued PT services for increased safety and independence with functional mobility. Treatment Diagnosis: Generalized weaknes R53.1  Prognosis: Fair  Decision Making: Medium Complexity  Patient Education: Role and goals of PT, techniques for bed mobility, transfer techniques, POC  Barriers to Learning: Impaired cognition  REQUIRES PT FOLLOW UP: Yes  Activity Tolerance: Patient Tolerated treatment well;Patient limited by cognitive status  Activity Tolerance: SpO2 94% on 3L O2 per nc, HR 88bpm       Patient Diagnosis(es): The primary encounter diagnosis was Congestive heart failure, unspecified HF chronicity, unspecified heart failure type ([facility] [address] Diagnoses of Hospital-acquired pneumonia, Pneumonia of right lower lobe due to infectious organism [facility]), Shortness of breath, and Cough were also pertinent to this visit.    has a past medical history of CAD (coronary artery disease), COPD (chronic obstructive pulmonary disease) ([facility] [address] Depression, Diabetes mellitus ([address] GERD (gastroesophageal reflux disease), Hyperlipidemia, Hypertension, Mood disorder ([address] Osteoarthritis, Osteoporosis, and Spondylolysis. has a past surgical history that includes eye surgery (Bilateral) and Blepharoplasty (Bilateral). Restrictions  Restrictions/Precautions  Restrictions/Precautions: Fall Risk, General Precautions  Position Activity Restriction  Other position/activity restrictions: Up with assistance, jim, 3L O2 per nc     Vision/Hearing  Vision: Impaired(Per chart review, pt is nearly blind)  Hearing: Exceptions to [facility]  Hearing Exceptions: Bilateral hearing aid;Hard of hearing/hearing concerns       Subjective  General  Chart Reviewed: Yes  Patient assessed for rehabilitation services?: Yes  Additional Pertinent Hx: Per chart review, patellar fx noted on XR on 4/23/19 after fall and presentation to ED on same day, pt was placed in knee brace and ambulated with assistance from staff before being sent back to [facility] from ED. During current admission, CT PE was negative for PE and negative for pneumonia, but suggested possible bronchitis or an aspiration event. Response To Previous Treatment: Not applicable  Family / Caregiver Present: No  Referring Practitioner: Mennie Blizzard, MD  Referral Date : 06/04/19  Diagnosis: CHF  Follows Commands: Impaired(Pt is responsive to questions ~ 40% of the time. Pt is rather flat, does not make eye contact with therapist, per chart review she is nearly blind)  General Comment  Comments: PT eval and treatment cleared by RN. Pt presents resting in bed, provided verbal consent to PT eval and treatment  Pain Screening  Patient Currently in Pain: Denies     Orientation  Orientation  Overall Orientation Status: Impaired  Orientation Level: Disoriented to place; Disoriented to time;Disoriented to situation     Social/Functional History  Social/Functional History  Type of Home: Facility([facility])  Additional Comments: Pt is poor historian and not very responsive to questions. Per chart review, pt resides at the [facility] and has been using a w/c for the past several weeks since patellar fx on 4/23. Prior to patellar fx, it appears pt was able to ambulate short distances with assist x1-2 using walker. Objective  AROM RLE (degrees)  RLE AROM: WFL  AROM LLE (degrees)  LLE AROM : WFL  LLE General AROM: Knee ROM not assessed, pt in KI on arrival which was left donned throughout eval and treatment  Strength RLE  Comment: Unable to formally assessed d/t impaired command following; based on mobility anticipate at least 3+/5 grossly  Strength LLE  Comment: Unable to formally assessed d/t impaired command following; based on mobility anticipate at least 3+/5 grossly     Sensation  Overall Sensation Status: (Unable to assess d/t impaired cognition)    Bed mobility  Supine to Sit: Dependent/Total(Mod Ax2 to right with HOB elevated, instruction for sequencing of LE's and use of UE's and bed rails to promote independence)  Scooting: Minimal assistance(Toward EOB in sitting)    Transfers  Sit to Stand: Dependent/Total(Mod Ax2)  From EOB to standing with RW, instruction with min assist for proper hand placement of R hand on bed rail to allow pushing through UE to promote safety and independence  Stand to sit: Dependent/Total(Mod Ax2)  From standing with RW to sitting in chair, instruction and cues provided for pt to position herself until she feels surface of chair on posterior aspect of BLE's.   Bed to Chair: Dependent/Total(Mod Ax2)  Transfer completed via ~8 small shuffling steps   Assist via gait belt for balance + assist for properly maneuvering walker throughout transfer    Ambulation  Ambulation?: No    Plan   Plan  Times per week: 2-4x/wk  Current Treatment Recommendations: Strengthening, Balance Training, Functional Mobility Training, Endurance Training, Gait Training, Safety Education & Training, Patient/Caregiver Education & Training, Equipment Evaluation, Education, & procurement, Positioning  Safety Devices  Type of devices: Left in chair, Call light within reach, Nurse notified, Chair alarm in place, Gait belt, Patient at risk for falls    AM-PAC Score  [id number] [address] [place] without Stair Climbing Raw Score : 9  AM-PAC Inpatient without Stair Climbing T-Scale Score : 32.44  Mobility Inpatient CMS 0-100% Score: 76.07  Mobility Inpatient without Stair CMS G-Code Modifier : CL     Goals  Short term goals  Time Frame for Short term goals: 6/11/19  Short term goal 1: Pt will complete supine<>sit with moderate assistance  Short term goal 2: Pt will transfer sit<>stand with moderate assistance  Short term goal 3: Pt will transfer bed<>chair with moderate assistance using RW  Short term goal 4: Pt will ambulate 13' with RW with moderate assistance  Short term goal 5: Pt will participate in 12 reps of supine and seated LE exercise for LE strengthening and ROM       Therapy Time   Individual Concurrent Group Co-treatment   Time In 1002         Time Out 1030         Minutes 28         Timed Code Treatment Minutes: 9 Minutes   10 minute evaluation + 9 minute treatment + 7 minutes of OT evaluation       Cece Quiles, PT

## 2019-06-04 NOTE — PROGRESS NOTES
Occupational Therapy   Occupational Therapy Initial Assessment and Treatment  Date: 2019   Patient Name: Maude Mccallum  MRN: [id number]     : 1930    Date of Service: 2019    Discharge Recommendations:  ECF with OT  OT Equipment Recommendations  Equipment Needed: No    Assessment   Performance deficits / Impairments: Decreased functional mobility ; Decreased strength;Decreased endurance;Decreased ADL status; Decreased safe awareness;Decreased cognition;Decreased balance  After evaluation and treatment, pt found to be presenting with the above mentioned deficits. Pt would benefit from continued skilled occupational therapy to address these deficits, increasing safety and independence with ADL and functional mobility. Pt needing encouragement to participate in session and easily fatigued. She requires mod A x2 currently for stand step t/fs, TA for LB ADL, and set-up for seated UB ADL. Prognosis: Fair  Decision Making: Medium Complexity  REQUIRES OT FOLLOW UP: Yes  Activity Tolerance  Activity Tolerance: Patient Tolerated treatment well;Treatment limited secondary to decreased cognition  Safety Devices  Safety Devices in place: Yes  Type of devices: Call light within reach; Left in chair;Chair alarm in place;Gait belt;Nurse notified         Patient Diagnosis(es): The primary encounter diagnosis was Congestive heart failure, unspecified HF chronicity, unspecified heart failure type ([facility] [address] Diagnoses of Hospital-acquired pneumonia, Pneumonia of right lower lobe due to infectious organism [facility]), Shortness of breath, and Cough were also pertinent to this visit. has a past medical history of CAD (coronary artery disease), COPD (chronic obstructive pulmonary disease) ([facility] [address] Depression, Diabetes mellitus ([facility] [address] GERD (gastroesophageal reflux disease), Hyperlipidemia, Hypertension, Mood disorder ([facility] [address] Osteoarthritis, Osteoporosis, and Spondylolysis.    has a past surgical history that includes eye surgery (Bilateral) and Blepharoplasty (Bilateral). Restrictions  Restrictions/Precautions  Restrictions/Precautions: Fall Risk, General Precautions  Position Activity Restriction  Other position/activity restrictions: Up with assistance    Subjective   General  Chart Reviewed: Yes  Patient assessed for rehabilitation services?: Yes  Family / Caregiver Present: No  Referring Practitioner: Justice Canas MD  Diagnosis: SOB 2/2 CHF  Subjective  Subjective: RN cleared pt for tx. Pt supine at session start. Minimally verbal with delayed responses. No pain indicated. Social/Functional History  Social/Functional History  Type of Home: Facility([facility])  Additional Comments: Pt is poor historian and not very responsive to questions. Per chart review, pt resides at the [facility] and has been using a w/c for the past several weeks since patellar fx on 4/23. Prior to patellar fx, it appears pt was able to ambulate short distances with assist x1-2 using walker. Objective   Vision: Impaired(Per chart, pt is nearly blind)  Hearing: Exceptions to [facility]  Hearing Exceptions: Bilateral hearing aid;Hard of hearing/hearing concerns      Orientation  Overall Orientation Status: Impaired  Orientation Level: Oriented to person;Disoriented to situation;Disoriented to time;Disoriented to place        Balance  Sitting Balance: Stand by assistance(static EOB, dynamic with back supported)  Standing Balance: Dependent/Total(mod A x2 with RW)  Functional Mobility  Functional Mobility Comments: Pt walked ~3ft from bed to adjacent chair with gait belt, RW, and mod A x2 with increased time. Max VCs for sequencing and safety.      ADL  Grooming: Supervision;Setup(to comb hair and wash face seated)  LE Dressing: Dependent/Total(socks)  Toileting: Dependent/Total(jim)     Tone RUE  RUE Tone: Normotonic  Tone LUE  LUE Tone: Normotonic  Coordination  Movements Are Fluid And Coordinated: Yes        Bed mobility  Supine to Sit: Dependent/Total(mod A x2, HOB raised, use of bed rail, increased time)  Scooting: Minimal assistance(to EOB and back in chair with increased time)     Transfers  Sit to stand: Dependent/Total(mod A x2)  Stand to sit: Dependent/Total(mod A x2)  Transfer Comments: VCs for safety and technique        Cognition  Overall Cognitive Status: Exceptions  Following Commands: Follows one step commands with repetition  Attention Span: Attends with cues to redirect; Difficulty attending to directions; Difficulty dividing attention  Memory: Decreased recall of recent events;Decreased short term memory  Safety Judgement: Decreased awareness of need for safety;Decreased awareness of need for assistance  Problem Solving: Assistance required to generate solutions;Assistance required to implement solutions;Assistance required to identify errors made;Assistance required to correct errors made;Decreased awareness of errors  Insights: Decreased awareness of deficits  Initiation: Requires cues for some  Sequencing: Requires cues for some        Sensation  Overall Sensation Status: (Unable to assess d/t impaired cognition)        LUE AROM (degrees)  LUE AROM : WFL(based on functional presentation)  RUE AROM (degrees)  RUE AROM : WFL(based on functional presentation)  LUE Strength  Gross LUE Strength: Exceptions to WFL(based on functional presentation)  RUE Strength  Gross RUE Strength: Exceptions to WFL(based on functional presentation)                Education: Role of OT, safe t/f training, safe use of DME, awareness of deficits, discharge planning, ADL as therapeutic exercise, importance of OOB, cognitive orientation    Plan   Plan  Times per week: 3-5    AM-PAC Score        AM-[facility] Inpatient Daily Activity Raw Score: 12 (06/04/19 1317)  AM-PAC Inpatient ADL T-Scale Score : 30.6 (06/04/19 1317)  ADL Inpatient CMS 0-100% Score: 66.57 (06/04/19 1317)  ADL Inpatient CMS G-Code Modifier : CL (06/04/19 1317)    Goals  Short term goals  Time Frame for Short term goals: 6/11/19  Short term goal 1: Functional t/f with min A x2 by 6/9/19  Short term goal 2: Tolerate 1 min static stand with min A in prep for standing level gina-care/dressing  Short term goal 3: Bed mobility with min Ax2  Short term goal 4: Toileting with min A x2  Patient Goals   Patient goals : None stated       Therapy Time   Individual Concurrent Group Co-treatment   Time In 1021         Time Out 1039         Minutes 18         Timed Code Treatment Minutes: 8 Minutes       If patient is discharged prior to next treatment session, this note will serve as the discharge summary.   Lisbet Hernandez, OTR/L #[id number]

## 2019-06-04 NOTE — PROGRESS NOTES
Speech Language Pathology  Facility/Department: [place] [address] PCU   CLINICAL BEDSIDE SWALLOW EVALUATION    NAME: Vane Balderas  : 1930  MRN: [id number]    ADMISSION DATE: 6/3/2019  ADMITTING DIAGNOSIS: has Pneumonia; Severe sepsis ([facility] [address] Depression; DMII (diabetes mellitus, type 2) ([facility] [address] Acute respiratory failure with hypoxia ([facility] [address] and CHF (congestive heart failure), NYHA class I, acute on chronic, combined ([facility] [address] on their problem list.  ONSET DATE: 19    Recent Chest Xray/CT of Chest: (19)  Impression   Findings suggest congestive heart failure         Date of Eval: 2019  Evaluating Therapist: Caridad Loya    Current Diet level:  Current Diet : Mech soft  Current Liquid Diet : Thin      Primary Complaint  Patient Complaint: No complaints from pt. Pain:  Pain Assessment  Pain Level: 0      Reason for Referral  Vane Balderas was referred for a bedside swallow evaluation to assess the efficiency of her swallow function, identify signs and symptoms of aspiration and make recommendations regarding safe dietary consistencies, effective compensatory strategies, and safe eating environment. Impression  Dysphagia Diagnosis: Mild oral stage dysphagia;Mild pharyngeal stage dysphagia  Dysphagia Outcome Severity Scale: Level 5: Mild dysphagia- Distant supervision. May need one diet consistency restricted     Pt asleep in bed upon SLP arrival, easily awakened. Pt lethargic, but able to maintain appropriate STEPAN to participate in evaluation. RN ok'd SLP entry into room. Pt with upper and lower dentures in place. Pt currently on 4L O2 via nasal cannula, O2 sats stable at 98% prior to PO trials. Pt observed with ice chips tolerating without difficulty.  Pt observed with thin liquids via tsp/cup/straw demonstrating with suspected premature bolus loss to pharynx, delayed swallow initiation, reduced laryngeal elevation upon manual palpation of the anterior neck, no overt s/s of SLP entry into room. Behavior/Cognition: Cooperative;Pleasant mood; Lethargic  Respiratory Status: O2 via nasual cannula  O2 Device: Nasal cannula  Liters of Oxygen: 4 L  Communication Observation: Functional  Follows Directions: Simple  Dentition: Dentures top;Dentures bottom  Patient Positioning: Upright in bed  Baseline Vocal Quality: Normal  Volitional Cough: Weak  Volitional Swallow: Delayed  Prior Dysphagia History: BSE completed on 09/01/18 recommending a Dysphagia III Advanced diet with thin liquids, please see EMR for further details. Consistencies Administered: Mechanical soft;Puree; Ice Chips; Thin - teaspoon; Thin - cup; Thin - straw           Vision/Hearing  Vision  Vision: Impaired(blindness per chart)  Hearing  Hearing: Exceptions to [facility]  Hearing Exceptions: Bilateral hearing aid;Hard of hearing/hearing concerns    Oral Motor Deficits  Oral/Motor  Oral Motor: Within functional limits    Oral Phase Dysfunction  Oral Phase  Oral Phase: Exceptions  Oral Phase Dysfunction  Decreased Anterior to Posterior Transit: All  Suspected Premature Bolus Loss: Thin - teaspoon; Thin - cup; Thin - straw  Lingual/Palatal Residue: Soft solid  Oral Phase  Oral Phase - Comment: oral phase of swallowing characterized by reduced A-P transit with all consistencies, suspected premature bolus loss with all thin liquids trials, and min residue in oral cavity post swallow with soft solids. Indicators of Pharyngeal Phase Dysfunction   Pharyngeal Phase  Pharyngeal Phase: Exceptions  Indicators of Pharyngeal Phase Dysfunction  Delayed Swallow: All  Decreased Laryngeal Elevation: All  Pharyngeal Phase   Pharyngeal: pharyngeal phase of swallowing characterized by delayed swallow initiation and reduced laryngeal elevation with all consistencies.      Prognosis  Prognosis  Prognosis for safe diet advancement: fair  Barriers to reach goals: age;cognitive deficits;inconsistent alertness  Individuals consulted  Consulted and agree with results and recommendations: Patient;RN    Education  Patient Education: Education provided to pt re: role of SLP, current diet recommendations, and safe swallow strategies during all meals. Patient Education Response: No evidence of learning;Needs reinforcement  Safety Devices in place: Yes  Type of devices: All fall risk precautions in place; Patient at risk for falls; Bed alarm in place; Left in bed;Call light within reach;Nurse notified       Therapy Time  SLP Individual Minutes  Time In: 0945  Time Out: 3972  Minutes: [address] [facility]-SLP  Speech-Language Pathologist  [phone]

## 2019-06-05 PROBLEM — I50.31 ACUTE DIASTOLIC CHF (CONGESTIVE HEART FAILURE) (HCC): Status: ACTIVE | Noted: 2019-06-05

## 2019-06-05 PROBLEM — I48.91 A-FIB (HCC): Status: ACTIVE | Noted: 2019-06-05

## 2019-06-05 PROBLEM — F03.90 DEMENTIA (HCC): Status: ACTIVE | Noted: 2019-06-05

## 2019-06-05 LAB
ANION GAP SERPL CALCULATED.3IONS-SCNC: 13 MMOL/L (ref 3–16)
BUN BLDV-MCNC: 34 MG/DL (ref 7–20)
CALCIUM SERPL-MCNC: 9 MG/DL (ref 8.3–10.6)
CHLORIDE BLD-SCNC: 100 MMOL/L (ref 99–110)
CO2: 30 MMOL/L (ref 21–32)
CREAT SERPL-MCNC: 1 MG/DL (ref 0.6–1.2)
GFR AFRICAN AMERICAN: >60
GFR NON-AFRICAN AMERICAN: 52
GLUCOSE BLD-MCNC: 156 MG/DL (ref 70–99)
LV EF: 58 %
LVEF MODALITY: NORMAL
MAGNESIUM: 2.1 MG/DL (ref 1.8–2.4)
POTASSIUM SERPL-SCNC: 3.6 MMOL/L (ref 3.5–5.1)
PRO-BNP: 4625 PG/ML (ref 0–449)
SODIUM BLD-SCNC: 143 MMOL/L (ref 136–145)

## 2019-06-05 PROCEDURE — G0378 HOSPITAL OBSERVATION PER HR: HCPCS

## 2019-06-05 PROCEDURE — 96376 TX/PRO/DX INJ SAME DRUG ADON: CPT

## 2019-06-05 PROCEDURE — 94640 AIRWAY INHALATION TREATMENT: CPT

## 2019-06-05 PROCEDURE — 6370000000 HC RX 637 (ALT 250 FOR IP): Performed by: INTERNAL MEDICINE

## 2019-06-05 PROCEDURE — 83735 ASSAY OF MAGNESIUM: CPT

## 2019-06-05 PROCEDURE — 94761 N-INVAS EAR/PLS OXIMETRY MLT: CPT

## 2019-06-05 PROCEDURE — 2580000003 HC RX 258: Performed by: INTERNAL MEDICINE

## 2019-06-05 PROCEDURE — 97110 THERAPEUTIC EXERCISES: CPT

## 2019-06-05 PROCEDURE — 97530 THERAPEUTIC ACTIVITIES: CPT

## 2019-06-05 PROCEDURE — 36415 COLL VENOUS BLD VENIPUNCTURE: CPT

## 2019-06-05 PROCEDURE — 6360000002 HC RX W HCPCS: Performed by: INTERNAL MEDICINE

## 2019-06-05 PROCEDURE — 97535 SELF CARE MNGMENT TRAINING: CPT

## 2019-06-05 PROCEDURE — 2700000000 HC OXYGEN THERAPY PER DAY

## 2019-06-05 PROCEDURE — 1200000000 HC SEMI PRIVATE

## 2019-06-05 PROCEDURE — 6370000000 HC RX 637 (ALT 250 FOR IP): Performed by: NURSE PRACTITIONER

## 2019-06-05 PROCEDURE — 96372 THER/PROPH/DIAG INJ SC/IM: CPT

## 2019-06-05 PROCEDURE — 80048 BASIC METABOLIC PNL TOTAL CA: CPT

## 2019-06-05 PROCEDURE — 93306 TTE W/DOPPLER COMPLETE: CPT

## 2019-06-05 PROCEDURE — 83880 ASSAY OF NATRIURETIC PEPTIDE: CPT

## 2019-06-05 RX ORDER — DESVENLAFAXINE 50 MG/1
100 TABLET, EXTENDED RELEASE ORAL EVERY OTHER DAY
Status: DISCONTINUED | OUTPATIENT
Start: 2019-06-05 | End: 2019-06-07 | Stop reason: HOSPADM

## 2019-06-05 RX ORDER — QUETIAPINE FUMARATE 25 MG/1
25 TABLET, FILM COATED ORAL NIGHTLY
Status: DISCONTINUED | OUTPATIENT
Start: 2019-06-05 | End: 2019-06-07 | Stop reason: HOSPADM

## 2019-06-05 RX ORDER — CHOLECALCIFEROL (VITAMIN D3) 125 MCG
5 CAPSULE ORAL NIGHTLY
Status: DISCONTINUED | OUTPATIENT
Start: 2019-06-05 | End: 2019-06-07 | Stop reason: HOSPADM

## 2019-06-05 RX ORDER — FUROSEMIDE 40 MG/1
40 TABLET ORAL DAILY
Status: DISCONTINUED | OUTPATIENT
Start: 2019-06-06 | End: 2019-06-07 | Stop reason: HOSPADM

## 2019-06-05 RX ADMIN — Medication 10 ML: at 08:56

## 2019-06-05 RX ADMIN — IPRATROPIUM BROMIDE AND ALBUTEROL SULFATE 3 ML: .5; 3 SOLUTION RESPIRATORY (INHALATION) at 11:30

## 2019-06-05 RX ADMIN — IPRATROPIUM BROMIDE AND ALBUTEROL SULFATE 3 ML: .5; 3 SOLUTION RESPIRATORY (INHALATION) at 07:36

## 2019-06-05 RX ADMIN — FUROSEMIDE 40 MG: 10 INJECTION, SOLUTION INTRAMUSCULAR; INTRAVENOUS at 08:57

## 2019-06-05 RX ADMIN — CARVEDILOL 3.12 MG: 3.12 TABLET, FILM COATED ORAL at 16:33

## 2019-06-05 RX ADMIN — ASPIRIN 81 MG: 81 TABLET, COATED ORAL at 08:56

## 2019-06-05 RX ADMIN — ACETAMINOPHEN 650 MG: 325 TABLET ORAL at 08:56

## 2019-06-05 RX ADMIN — ENOXAPARIN SODIUM 40 MG: 40 INJECTION SUBCUTANEOUS at 08:56

## 2019-06-05 RX ADMIN — APIXABAN 2.5 MG: 2.5 TABLET, FILM COATED ORAL at 21:19

## 2019-06-05 RX ADMIN — Medication 10 ML: at 21:25

## 2019-06-05 RX ADMIN — MELATONIN TAB 5 MG 5 MG: 5 TAB at 21:19

## 2019-06-05 RX ADMIN — DESVENLAFAXINE SUCCINATE 100 MG: 50 TABLET, FILM COATED, EXTENDED RELEASE ORAL at 21:18

## 2019-06-05 RX ADMIN — CARVEDILOL 3.12 MG: 3.12 TABLET, FILM COATED ORAL at 08:56

## 2019-06-05 RX ADMIN — IPRATROPIUM BROMIDE AND ALBUTEROL SULFATE 3 ML: .5; 3 SOLUTION RESPIRATORY (INHALATION) at 20:40

## 2019-06-05 RX ADMIN — QUETIAPINE FUMARATE 25 MG: 25 TABLET ORAL at 21:19

## 2019-06-05 RX ADMIN — ACETAMINOPHEN 650 MG: 325 TABLET ORAL at 04:29

## 2019-06-05 RX ADMIN — IPRATROPIUM BROMIDE AND ALBUTEROL SULFATE 3 ML: .5; 3 SOLUTION RESPIRATORY (INHALATION) at 15:54

## 2019-06-05 ASSESSMENT — PAIN SCALES - GENERAL
PAINLEVEL_OUTOF10: 5
PAINLEVEL_OUTOF10: 3
PAINLEVEL_OUTOF10: 0
PAINLEVEL_OUTOF10: 0

## 2019-06-05 ASSESSMENT — PAIN SCALES - WONG BAKER: WONGBAKER_NUMERICALRESPONSE: 6

## 2019-06-05 ASSESSMENT — PAIN DESCRIPTION - LOCATION: LOCATION: ABDOMEN

## 2019-06-05 ASSESSMENT — PAIN DESCRIPTION - FREQUENCY: FREQUENCY: INTERMITTENT

## 2019-06-05 ASSESSMENT — PAIN DESCRIPTION - PAIN TYPE: TYPE: ACUTE PAIN

## 2019-06-05 NOTE — PLAN OF CARE
Demonstrates accurate environmental perceptions  Description  Demonstrates accurate environmental perceptions  Outcome: Ongoing  Goal: Able to distinguish between reality-based and nonreality-based thinking  Description  Able to distinguish between reality-based and nonreality-based thinking  Outcome: Ongoing  Goal: Able to interrupt nonreality-based thinking  Description  Able to interrupt nonreality-based thinking  Outcome: Ongoing     Problem: Sleep Pattern Disturbance:  Goal: Appears well-rested  Description  Appears well-rested  Outcome: Ongoing     Problem: OXYGENATION/RESPIRATORY FUNCTION  Goal: Patient will maintain patent airway  6/5/2019 0934 by Kianna Chappell RN  Outcome: Ongoing  6/5/2019 0612 by Aicha Urias RN  Outcome: Ongoing  Goal: Patient will achieve/maintain normal respiratory rate/effort  Description  Respiratory rate and effort will be within normal limits for the patient  Outcome: Ongoing     Problem: HEMODYNAMIC STATUS  Goal: Patient has stable vital signs and fluid balance  Outcome: Ongoing     Problem: FLUID AND ELECTROLYTE IMBALANCE  Goal: Fluid and electrolyte balance are achieved/maintained  Outcome: Ongoing     Problem: ACTIVITY INTOLERANCE/IMPAIRED MOBILITY  Goal: Mobility/activity is maintained at optimum level for patient  Outcome: Ongoing     Problem: Infection:  Goal: Will remain free from infection  Description  Will remain free from infection  Outcome: Ongoing     Problem: Safety:  Goal: Free from accidental physical injury  Description  Free from accidental physical injury  Outcome: Ongoing  Goal: Free from intentional harm  Description  Free from intentional harm  Outcome: Ongoing     Problem: Daily Care:  Goal: Daily care needs are met  Description  Daily care needs are met  Outcome: Ongoing     Problem: Pain:  Goal: Patient's pain/discomfort is manageable  Description  Patient's pain/discomfort is manageable  Outcome: Ongoing  Goal: Pain level will decrease  Description  Pain level will decrease  Outcome: Ongoing  Goal: Control of acute pain  Description  Control of acute pain  Outcome: Ongoing  Goal: Control of chronic pain  Description  Control of chronic pain  Outcome: Ongoing     Problem: Skin Integrity:  Goal: Skin integrity will stabilize  Description  Skin integrity will stabilize  Outcome: Ongoing     Problem: Discharge Planning:  Goal: Patients continuum of care needs are met  Description  Patients continuum of care needs are met  Outcome: Ongoing     Problem: Nutrition  Goal: Optimal nutrition therapy  Outcome: Ongoing     Problem: Restraint Use - Nonviolent/Non-Self-Destructive Behavior:  Goal: Absence of restraint indications  Description  Absence of restraint indications  Outcome: Ongoing  Goal: Absence of restraint-related injury  Description  Absence of restraint-related injury  Outcome: Ongoing

## 2019-06-05 NOTE — PROGRESS NOTES
Hospitalist Progress Note      PCP: Patric Koroma    Date of Admission: 6/3/2019    Chief Complaint: SOB    Hospital Course:  80 y.o. female who presented to [facility] with shortness of breath. She is a poor historian. PMHx sig for blindness, COPD, HTN, CAD.     She comes from [facility]. She was sent for acute dyspnea, but she says she has been short of breath for months and her son says she has been short of breath for a week. She endorses a cough, chest pain, abdominal pain, and nausea, but no vomiting.     She fractured her patella earlier this spring and has been in a wheelchair for several weeks.      In the ED, she was reported to be hypoxic on admission, and was placed on supplemental O2. CT PE was negative for PE and negative for pneumonia, but suggested possible bronchitis or an aspiration event.      Subjective: Confused and had a rough night, requiring Haldol and restraints. Patient denies specific complaints. On 2.5L nc.     Medications:  Reviewed    Infusion Medications   Scheduled Medications    [START ON 6/6/2019] furosemide  40 mg Oral Daily    QUEtiapine  25 mg Oral Nightly    desvenlafaxine succinate  100 mg Oral Every Other Day    melatonin  5 mg Oral Nightly    apixaban  5 mg Oral BID    aspirin  81 mg Oral Daily    carvedilol  3.125 mg Oral BID WC    sodium chloride flush  10 mL Intravenous 2 times per day    ipratropium-albuterol  3 mL Inhalation Q4H WA     PRN Meds: melatonin, sodium chloride flush, magnesium hydroxide, ondansetron, acetaminophen      Intake/Output Summary (Last 24 hours) at 6/5/2019 1955  Last data filed at 6/5/2019 1621  Gross per 24 hour   Intake 180 ml   Output 2650 ml   Net -2470 ml       Physical Exam Performed:    /83   Pulse 96   Temp 97.9 °F (36.6 °C) (Oral)   Resp 18   Ht 5' 4\" (1.626 m)   Wt 139 lb 15.9 oz (63.5 kg)   SpO2 92%   BMI 24.03 kg/m²     General appearance: Elderly female in no apparent distress, appears stated age and cooperative. HEENT: Pupils equal, round, and reactive to light. Conjunctivae/corneas clear. Neck: Supple, with full range of motion. No jugular venous distention. Trachea midline. Respiratory:  Normal respiratory effort. Clear to auscultation, bilaterally without Rales/Wheezes/Rhonchi. Cardiovascular: Irregularly irregular with normal S1/S2 without murmurs, rubs or gallops. Abdomen: Soft, non-tender, non-distended with normal bowel sounds. Musculoskeletal: No clubbing, cyanosis or edema bilaterally. Full range of motion without deformity. Skin: Skin color, texture, turgor normal.  No rashes or lesions. Neurologic:  Neurovascularly intact without any focal sensory/motor deficits. Cranial nerves: II-XII intact, grossly non-focal.  Psychiatric: Alert and oriented to person only. Limited insight. Capillary Refill: Brisk,< 3 seconds   Peripheral Pulses: +2 palpable, equal bilaterally       Labs:   Recent Labs     06/03/19  1750   WBC 9.6   HGB 11.1*   HCT 33.1*        Recent Labs     06/03/19  1750 06/04/19  0429 06/05/19  0425    137 143   K 4.0 3.9 3.6   CL 99 99 100   CO2 25 26 30   BUN 34* 29* 34*   CREATININE 0.9 0.8 1.0   CALCIUM 9.2 8.7 9.0     Recent Labs     06/03/19  1750   AST 17   ALT 15   BILITOT 0.3   ALKPHOS 77     No results for input(s): INR in the last 72 hours. Recent Labs     06/03/19  1750   TROPONINI <0.01       Urinalysis:      Lab Results   Component Value Date    NITRU Negative 06/03/2019    WBCUA 6-10 11/30/2018    BACTERIA 4+ 11/30/2018    RBCUA 3-5 11/30/2018    BLOODU Negative 06/03/2019    SPECGRAV <=1.005 06/03/2019    GLUCOSEU Negative 06/03/2019       Radiology:  CT CHEST PULMONARY EMBOLISM W CONTRAST   Final Result   1. Negative CT angiogram for acute pulmonary embolism. 2. Bronchial wall thickening within the right lower lobe bronchi which could   indicate a bronchitis or sequelae of aspiration. There is however no   pneumonia evident.    3. Small likely due to CHF. - supplemental O2 to keep sats > 92%. Wean as tolerated. Metabolic encephalopathy - multifactorial.  -underlying dementia. -supportive care. Add Seroquel 25 mg nightly to see if helps with night time confusion/agitation.     COPD without exacerbation  - continue duonebs      HTN with hx of CAD  -BP soft on admission  -hold home amlodipine for now.  -continue Lasix and BB.     Depression  -continue home Pristiq       DVT Prophylaxis: Eliquis  Diet: DIET DENTAL SOFT;  Dietary Nutrition Supplements: Frozen Oral Supplement, Standard High Calorie Oral Supplement  Code Status: Full Code    PT/OT Eval Status: recommend ECF with PT/OT    Dispo - planning for [facility] - likely 1-2 days    SOFIA Bravo - CNP

## 2019-06-05 NOTE — PROGRESS NOTES
Physical Therapy  Facility/Department: [place] [address] [facility]  Daily Treatment Note  NAME: Darleen Cuello  : 1930  MRN: [id number]    Date of Service: 2019    Discharge Recommendations:  ECF with PT   PT Equipment Recommendations  Equipment Needed: No    Assessment   Body structures, Functions, Activity limitations: Decreased functional mobility ; Decreased strength;Decreased balance;Decreased endurance;Decreased cognition;Decreased safe awareness  Assessment: Pt participated with therapy fairly this afternoon, transferring OOB with modA x 1-2 and successfully standing twice with assist ranging from modA x 1 to Bushra x 2. Pt's baseline cognitive impairments make consistent participation with therapy and carryover difficult. Recommend ECF with PT services at D/C. Treatment Diagnosis: Generalized weaknes R53.1  Specific instructions for Next Treatment: Progress ther ex and mobility as tolerated  Prognosis: Fair  Decision Making: Medium Complexity  Patient Education: Role and goals of PT, techniques for bed mobility, transfer techniques with walker, LE ther ex, POC - pt demonstrates minimal understanding & requires lots of reinforcement  Barriers to Learning: Impaired cognition  REQUIRES PT FOLLOW UP: Yes  Activity Tolerance: Patient Tolerated treatment well;Patient limited by cognitive status  Activity Tolerance: O2 sat = 93% after performing functional mobility tasks (including sit<>stand and amb), while on 3L O2. Patient Diagnosis(es): The primary encounter diagnosis was Congestive heart failure, unspecified HF chronicity, unspecified heart failure type ([facility] [address] Diagnoses of Hospital-acquired pneumonia, Pneumonia of right lower lobe due to infectious organism [facility]), Shortness of breath, and Cough were also pertinent to this visit.      has a past medical history of CAD (coronary artery disease), COPD (chronic obstructive pulmonary disease) ([facility] [address] Depression, Diabetes mellitus ([facility] [address] GERD (gastroesophageal reflux disease), Hyperlipidemia, Hypertension, Mood disorder ([facility] [address] Osteoarthritis, Osteoporosis, and Spondylolysis. has a past surgical history that includes eye surgery (Bilateral) and Blepharoplasty (Bilateral). Restrictions  Restrictions/Precautions  Restrictions/Precautions: Fall Risk, General Precautions  Position Activity Restriction  Other position/activity restrictions: Up with assistance, Maria, 3L O2, telemetry, B wrist restraints  Subjective   General  Chart Reviewed: Yes  Response To Previous Treatment: Patient unable to report, no changes reported from family or staff  Family / Caregiver Present: No  Referring Practitioner: Javan Flores MD  Subjective  Subjective: Pt agreeable to work with PT this afternoon. Pleasant and generally cooperative but very confused. C/o \"my belly hurting\" after performing mobility tasks. General Comment  Comments: Pt resting in bed upon entry of PT; RN cleared pt to work with PT  Pain Screening  Patient Currently in Pain: Yes  Pain Assessment  Pain Assessment: Faces  Mccollum-Baker Pain Rating: Hurts even more  Pain Type: Acute pain  Pain Location: Abdomen  Pain Frequency: Intermittent(no s/s pain at rest, pt c/o moderate \"stomach\" pain with activity)  Non-Pharmaceutical Pain Intervention(s): Ambulation/Increased Activity; Emotional support;Repositioned; Therapeutic presence    Orientation  Orientation  Overall Orientation Status: Impaired  Orientation Level: Disoriented X4    Objective   Bed mobility  Supine to Sit: Moderate assistance(moving toward L side, HOB elevated ~30 degrees, max cues needed to elicit movement)  Sit to Supine: 2 Person assistance(modA x 2)  Scootin Person assistance(Bushra x 1 to scoot forward to EOB, maxA x 2 to scoot up in bed)     Transfers  Sit to Stand:  Moderate Assistance;2 Person Assistance(modA x 1 from EOB to RW on first attempt, Bushra x 2 from EOB to RW on 2nd attempt)  Stand to sit: Moderate Assistance  Bed to Chair: Unable to assess(unable to safely attempt due to RN's request to transfer pt back to bed after therapy session (due to pt having pulled out multiple lines today and being in B wrist restraints))     Ambulation  Surface: level tile  Device: Rolling Walker  Assistance: 2 Person assistance(Bushra x 2)  Quality of Gait: Pt amb with very slow haylie with short, shuffling steps with limited foot clearance. Moderately unsteady with cues needed elicit forward and backward stepping. Gait Deviations: Slow Haylie; Shuffles;Decreased step length;Decreased step height;Decreased head and trunk rotation  Distance: x 3 feet forward, x 3 feet backward  Comments: Amb distance limited by length of O2 tubing and LE weakness.     Balance  Posture: Fair  Sitting - Static: Fair  Sitting - Dynamic: Fair;-  Standing - Static: Fair;-  Standing - Dynamic: Poor;+     Exercises  Hip Abduction: Attempted several reps on LLE with mod-maxA, although pt becoming increasingly resistive during exercise, so stopped at this point in session  Ankle Pumps: x 10 BLE with mod-maxA      AM-PAC Score  AM-PAC Inpatient Mobility without Stair Climbing Raw Score : 10 (06/05/19 1703)  AM-PAC Inpatient without Stair Climbing T-Scale Score : 34.07 (06/05/19 1703)  Mobility Inpatient CMS 0-100% Score: 71.66 (06/05/19 1703)  Mobility Inpatient without Stair CMS G-Code Modifier : CL (06/05/19 1703)    Goals  Short term goals  Time Frame for Short term goals: 1 week, 6/11/19 (unless otherwise specified)  Short term goal 1: Pt will complete supine<>sit with moderate assistance  Short term goal 2: Pt will transfer sit<>stand with moderate assistance  Short term goal 3: Pt will transfer bed<>chair with moderate assistance using RW  Short term goal 4: Pt will ambulate 13' with RW with moderate assistance  Short term goal 5: By 6/07/19: Pt will participate in 12 reps of supine and seated LE exercise for LE strengthening and ROM  Patient Goals   Patient goals : Pt unable to voice goals when asked 2* cognitive impairments    Plan    Times per week: 2-4x/week in acute care  Times per day: Daily  Specific instructions for Next Treatment: Progress ther ex and mobility as tolerated  Current Treatment Recommendations: Strengthening, Balance Training, Functional Mobility Training, Endurance Training, Gait Training, Safety Education & Training, Patient/Caregiver Education & Training, Equipment Evaluation, Education, & procurement, Positioning  Safety Devices: Call light within reach, Nurse notified, Gait belt, Patient at risk for falls, All fall risk precautions in place, Bed alarm in place, Left in bed  Restraints  Initially in place: Yes  Restraints: B wrist restraints -- reapplied at end of therapy session     Therapy Time   Individual Concurrent Group Co-treatment   Time In 1450         Time Out 1515         Minutes 25         Timed Code Treatment Minutes: [address] [facility], [address] [facility], [address]

## 2019-06-05 NOTE — PROGRESS NOTES
Speech Language Pathology  Attempt Note    SLP reviewed chart and spoke with RN. Pt sleeping soundly in room and RN requested to let pt sleep at this time. RN reported pt is tolerating diet, however, not eating much. SLP to re-attempt dysphagia tx as schedule allows. Thank you.     Jonelle Spann, [address] [facility]  Speech-Language Pathologist

## 2019-06-05 NOTE — PROGRESS NOTES
Advance Care Planning Note    Name: Ruth Montilla  YOB: 1930  MRN: [id number]  Admission Date: 6/3/2019  4:14 PM    Date of Discussion: 6/5/2019    Active Diagnoses:     Acute diastolic CHF (congestive heart failure) (HCC) [I50.31]    A-fib ([facility] [address] [I48.91]    Dementia [F03.90]        Acute respiratory failure with hypoxia ([facility] [address] [J96.01]    Depression [F32.9]              These active diagnoses are of sufficient risk that focused discussion on advance care planning is indicated in order to allow the patient to thoughtfully consider personal goals of care; and, if situations arise that prevent the ability to personally give input, to ensure appropriate representation of their personal desires for different levels and agressiveness of care. Discussion:    Persons present and participating in discussion: Ruth Montilla, Rosalba Tomlin, son, Yesica Tolbert (via telephone)    Patient's decisional capacity or surrogate:  Surrogate - Toño Vázquez. There is also a daughter that lives in [place]. Discussion in summary: Patient noted to be in Afib, rate controlled, and is high risk for stroke. Also discussed patient's history of dementia and difficulty with sun downing. At this time, Toño Vázquez would like for the patient to remain a full code and be started on blood thinners for stroke reduction. Discussed with Toño Vázquez that she would be at increased risk for catastrophic hemorrhage if she were to have a traumatic injury. Toño Vázquez verbalized understanding. Long term plan is for the patient to return to The [address] [place] [address] home. Code status: full    Time Spent: 1930 - 1946    Total time spent face-to-face in education and discussion: 16 minutes.     Electronically signed by SOFIA Barrientos CNP on 6/5/2019 at 8:06 PM

## 2019-06-05 NOTE — FLOWSHEET NOTE
06/05/19 0814   Assessment   Charting Type Shift assessment   Neurological   Neuro (WDL) X   Level of Consciousness 0   Orientation Level Oriented to person;Disoriented to place;Oriented to time;Disoriented to situation   Loren Coma Scale   Eye Opening 4   Best Verbal Response 4   Best Motor Response 6   [place] Coma Scale Score 14   NIH/MNHISS Stroke Scale   NIH/MNIHSS Stroke Scale Assessed No   HEENT   HEENT (WDL) X   Right Eye Blind   Left Eye Blind   Right Ear Impaired hearing   Left Ear Impaired hearing   Mucous Membrane Moist;Pink; Intact   Teeth Dentures upper;Dentures lower   Cardiac   Cardiac (WDL) X   Cardiac Regularity Irregular   Heart Sounds S1, S2   Cardiac Rhythm Atrial fibrillation   Rhythm Interpretation   Pulse 105   Cardiac Monitor   Telemetry Monitor On Yes   Telemetry Audible Yes   Telemetry Alarms Set Yes   Telemetry Box Number 25   Gastrointestinal   Abdominal (WDL) WDL   Peripheral Vascular   Peripheral Vascular (WDL) X   Edema Right lower extremity; Left lower extremity   RLE Edema +2;Pitting   LLE Edema +1;Pitting   Skin Color/Condition   Skin Color/Condition (WDL) X   Skin Integrity   Skin Integrity (WDL) X   Musculoskeletal   Musculoskeletal (WDL) X   RUE Full movement   LUE Full movement   RL Extremity Full movement;Weakness   LL Extremity Surgery;Limited movement;Weakness  (L patella fx)   Musculoskeletal Details   L Knee Immobilizer;Limited movement  (L patella fx)   Genitourinary   Genitourinary (WDL) X  (jim)   Flank Tenderness No   Suprapubic Tenderness No   Dysuria SIOBHAN   Anus/Rectum   Anus/Rectum (WDL) WDL   Urethral Catheter   Placement Date/Time: 06/03/19 2300   Urethral Catheter Timeout: Patient  Catheter Balloon Size: 10 mL  Collection Container: Standard  Securement Method: Leg strap  Urine Returned: Yes   Catheter Indications Need for fluid management in critically ill patients in a critical care setting not able to be managed by other means such as BSC with hat, bedpan, urinal, condom catheter, or short term intermittent urethral catherization   Site Assessment Swelling;Pink; No urethral drainage   Urine Color Yellow   Urine Appearance Clear   Psychosocial   Psychosocial (WDL) X   Patient Behaviors Calm; Cooperative; Uncooperative  (uncooperative at times)

## 2019-06-05 NOTE — PROGRESS NOTES
Restriction  Other position/activity restrictions: Up with assistance, jim, 3L O2 per nc, B wrist restraints  Subjective   General  Chart Reviewed: Yes  Patient assessed for rehabilitation services?: Yes  Family / Caregiver Present: No  Referring Practitioner: Burak Morgan MD  Diagnosis: SOB 2/2 CHF  Subjective  Subjective: Pt seen for partial cotx iwth PT d/t assist level  General Comment  Comments: RN clears for therapy  Pain Assessment  Pain Assessment: Faces  Pain Level: 0  Vital Signs  Patient Currently in Pain: No   Orientation  Orientation  Overall Orientation Status: Impaired  Orientation Level: Oriented to person;Disoriented to situation;Disoriented to time;Disoriented to place  Objective    ADL  Feeding: (refused)  Grooming: Moderate assistance(thoroughness to comb hair, refuses to wash face or perform oral care with setup)  LE Dressing: Dependent/Total(socks)  Toileting: Dependent/Total(jim)        Balance  Sitting Balance: Supervision  Standing Balance: Dependent/Total(mod A x 2)  Standing Balance  Time: ~10 sec, ~30 sec  Activity: standing at bedside  Functional Mobility  Functional Mobility Comments: Pt walked ~3ft from bed forward and back with gait belt, RW, and mod A x2 with increased time. Max VCs for sequencing and safety. Transfers  Sit to stand: Dependent/Total(mod A x2)  Stand to sit: Dependent/Total(mod A x2)  Transfer Comments: VCs for safety and technique                       Cognition  Overall Cognitive Status: Exceptions  Arousal/Alertness: Delayed responses to stimuli;Inconsistent responses to stimuli  Following Commands: Follows one step commands with repetition  Attention Span: Attends with cues to redirect; Difficulty attending to directions; Difficulty dividing attention  Memory: Decreased recall of recent events;Decreased short term memory  Safety Judgement: Decreased awareness of need for safety;Decreased awareness of need for assistance  Problem Solving: Assistance required to generate solutions;Assistance required to implement solutions;Assistance required to identify errors made;Assistance required to correct errors made;Decreased awareness of errors  Insights: Decreased awareness of deficits  Initiation: Requires cues for all  Sequencing: Requires cues for all                    Exercises  Finger Flexion: x10  Finger Extension: x10                    Plan   Plan  Times per week: 3-5    AM-PAC Score        AM-[facility] Inpatient Daily Activity Raw Score: 11 (06/05/19 1606)  AM-PAC Inpatient ADL T-Scale Score : 29.04 (06/05/19 1606)  ADL Inpatient CMS 0-100% Score: 70.42 (06/05/19 1606)  ADL Inpatient CMS G-Code Modifier : CL (06/05/19 1606)    Goals  Short term goals  Time Frame for Short term goals: 6/11/19  Short term goal 1: Functional t/f with min A x2 by 6/9/19  Short term goal 2: Tolerate 1 min static stand with min A in prep for standing level gina-care/dressing  Short term goal 3: Bed mobility with min Ax2  Short term goal 4: Toileting with min A x2  Patient Goals   Patient goals : None stated       Therapy Time   Individual Concurrent Group Co-treatment   Time In 1515     1458   Time Out 1521     1515   Minutes 6     17   Timed Code Treatment Minutes: [address], OT

## 2019-06-06 LAB
ANION GAP SERPL CALCULATED.3IONS-SCNC: 12 MMOL/L (ref 3–16)
BUN BLDV-MCNC: 27 MG/DL (ref 7–20)
CALCIUM SERPL-MCNC: 9 MG/DL (ref 8.3–10.6)
CHLORIDE BLD-SCNC: 100 MMOL/L (ref 99–110)
CO2: 29 MMOL/L (ref 21–32)
CREAT SERPL-MCNC: 0.8 MG/DL (ref 0.6–1.2)
GFR AFRICAN AMERICAN: >60
GFR NON-AFRICAN AMERICAN: >60
GLUCOSE BLD-MCNC: 144 MG/DL (ref 70–99)
MAGNESIUM: 2.1 MG/DL (ref 1.8–2.4)
POTASSIUM SERPL-SCNC: 3.5 MMOL/L (ref 3.5–5.1)
PRO-BNP: 4102 PG/ML (ref 0–449)
SODIUM BLD-SCNC: 141 MMOL/L (ref 136–145)

## 2019-06-06 PROCEDURE — 2580000003 HC RX 258: Performed by: INTERNAL MEDICINE

## 2019-06-06 PROCEDURE — 36415 COLL VENOUS BLD VENIPUNCTURE: CPT

## 2019-06-06 PROCEDURE — 6370000000 HC RX 637 (ALT 250 FOR IP): Performed by: INTERNAL MEDICINE

## 2019-06-06 PROCEDURE — 97116 GAIT TRAINING THERAPY: CPT

## 2019-06-06 PROCEDURE — 83880 ASSAY OF NATRIURETIC PEPTIDE: CPT

## 2019-06-06 PROCEDURE — 80048 BASIC METABOLIC PNL TOTAL CA: CPT

## 2019-06-06 PROCEDURE — 94640 AIRWAY INHALATION TREATMENT: CPT

## 2019-06-06 PROCEDURE — 1200000000 HC SEMI PRIVATE

## 2019-06-06 PROCEDURE — 6370000000 HC RX 637 (ALT 250 FOR IP): Performed by: NURSE PRACTITIONER

## 2019-06-06 PROCEDURE — 97530 THERAPEUTIC ACTIVITIES: CPT

## 2019-06-06 PROCEDURE — 83735 ASSAY OF MAGNESIUM: CPT

## 2019-06-06 PROCEDURE — 99222 1ST HOSP IP/OBS MODERATE 55: CPT | Performed by: INTERNAL MEDICINE

## 2019-06-06 PROCEDURE — 2700000000 HC OXYGEN THERAPY PER DAY

## 2019-06-06 PROCEDURE — 94761 N-INVAS EAR/PLS OXIMETRY MLT: CPT

## 2019-06-06 RX ORDER — IPRATROPIUM BROMIDE AND ALBUTEROL SULFATE 2.5; .5 MG/3ML; MG/3ML
1 SOLUTION RESPIRATORY (INHALATION) EVERY 4 HOURS PRN
Status: DISCONTINUED | OUTPATIENT
Start: 2019-06-06 | End: 2019-06-07 | Stop reason: HOSPADM

## 2019-06-06 RX ORDER — IPRATROPIUM BROMIDE AND ALBUTEROL SULFATE 2.5; .5 MG/3ML; MG/3ML
3 SOLUTION RESPIRATORY (INHALATION) 2 TIMES DAILY
Status: DISCONTINUED | OUTPATIENT
Start: 2019-06-07 | End: 2019-06-07 | Stop reason: HOSPADM

## 2019-06-06 RX ADMIN — QUETIAPINE FUMARATE 25 MG: 25 TABLET ORAL at 22:45

## 2019-06-06 RX ADMIN — CARVEDILOL 3.12 MG: 3.12 TABLET, FILM COATED ORAL at 08:41

## 2019-06-06 RX ADMIN — IPRATROPIUM BROMIDE AND ALBUTEROL SULFATE 3 ML: .5; 3 SOLUTION RESPIRATORY (INHALATION) at 19:14

## 2019-06-06 RX ADMIN — Medication 10 ML: at 08:42

## 2019-06-06 RX ADMIN — IPRATROPIUM BROMIDE AND ALBUTEROL SULFATE 3 ML: .5; 3 SOLUTION RESPIRATORY (INHALATION) at 11:32

## 2019-06-06 RX ADMIN — MELATONIN TAB 5 MG 5 MG: 5 TAB at 22:45

## 2019-06-06 RX ADMIN — CARVEDILOL 3.12 MG: 3.12 TABLET, FILM COATED ORAL at 17:40

## 2019-06-06 RX ADMIN — ASPIRIN 81 MG: 81 TABLET, COATED ORAL at 08:42

## 2019-06-06 RX ADMIN — APIXABAN 2.5 MG: 2.5 TABLET, FILM COATED ORAL at 08:42

## 2019-06-06 RX ADMIN — FUROSEMIDE 40 MG: 40 TABLET ORAL at 08:41

## 2019-06-06 RX ADMIN — APIXABAN 2.5 MG: 2.5 TABLET, FILM COATED ORAL at 22:45

## 2019-06-06 RX ADMIN — IPRATROPIUM BROMIDE AND ALBUTEROL SULFATE 3 ML: .5; 3 SOLUTION RESPIRATORY (INHALATION) at 07:39

## 2019-06-06 RX ADMIN — IPRATROPIUM BROMIDE AND ALBUTEROL SULFATE 3 ML: .5; 3 SOLUTION RESPIRATORY (INHALATION) at 16:17

## 2019-06-06 RX ADMIN — Medication 10 ML: at 22:45

## 2019-06-06 ASSESSMENT — PAIN SCALES - WONG BAKER
WONGBAKER_NUMERICALRESPONSE: 6
WONGBAKER_NUMERICALRESPONSE: 6

## 2019-06-06 ASSESSMENT — PAIN DESCRIPTION - PAIN TYPE: TYPE: ACUTE PAIN

## 2019-06-06 ASSESSMENT — PAIN SCALES - GENERAL
PAINLEVEL_OUTOF10: 0

## 2019-06-06 ASSESSMENT — PAIN DESCRIPTION - FREQUENCY: FREQUENCY: INTERMITTENT

## 2019-06-06 ASSESSMENT — PAIN DESCRIPTION - LOCATION: LOCATION: GENERALIZED

## 2019-06-06 NOTE — PLAN OF CARE
Patient's EF (Ejection Fraction) is greater than 40%    Patient's weights and intake/output reviewed:    Patient's Last Weight: 140.14 lbs obtained by bed scale. Today's weight is noted to be 0.99 lbs more than last documented weight. Intake/Output Summary (Last 24 hours) at 6/6/2019 0541  Last data filed at 6/6/2019 0506  Gross per 24 hour   Intake 180 ml   Output 1625 ml   Net -1445 ml       Patient's current functional capacity:  Slight limitation of physical activity. Comfortable at rest. Ordinary physical activity results in fatigue, palpitation, dyspnea. Pt resting in bed at this time on 2.5 L O2. Pt denies shortness of breath. Pt with pitting lower extremity edema. Patient and family's stated goal of care: be more comfortable prior to discharge        Patient has a past medical history of CAD (coronary artery disease), COPD (chronic obstructive pulmonary disease) ([address] Depression, Diabetes mellitus ([address] GERD (gastroesophageal reflux disease), Hyperlipidemia, Hypertension, Mood disorder ([address] Osteoarthritis, Osteoporosis, and Spondylolysis. Comorbidities reviewed and education provided.     >>For CHF and Comorbidity documentation on Education Time and Topics, please see Education Tab

## 2019-06-06 NOTE — PROGRESS NOTES
Patient refusing all po drinks and food stating \"get away from me. \" Patient attempts to grab, pull at RN when trying to assess and provide care. RN will continue to monitor.

## 2019-06-06 NOTE — PROGRESS NOTES
Patient's EF (Ejection Fraction) is greater than 40%    Patient's weights and intake/output reviewed:    Patient's Last Weight: 140lb 14oz  obtained by bed scale. Today's weight is noted to be 1lb 1.9oz more than last documented weight. Intake/Output Summary (Last 24 hours) at 6/6/2019 1238  Last data filed at 6/6/2019 1213  Gross per 24 hour   Intake 160 ml   Output 2345 ml   Net -2185 ml       Patient's current functional capacity:  Slight limitation of physical activity. Comfortable at rest. Ordinary physical activity results in fatigue, palpitation, dyspnea. Pt resting in bed at this time on 1 L O2. Pt denies shortness of breath. Pt without lower extremity edema. Patient and family's stated goal of care: reduce shortness of breath, increase activity tolerance, better understand heart failure and disease management, be more comfortable and reduce lower extremity edema prior to discharge        Patient has a past medical history of CAD (coronary artery disease), COPD (chronic obstructive pulmonary disease) ([address] Depression, Diabetes mellitus ([address] GERD (gastroesophageal reflux disease), Hyperlipidemia, Hypertension, Mood disorder ([address] Osteoarthritis, Osteoporosis, and Spondylolysis. Comorbidities reviewed and education provided.     >>For CHF and Comorbidity documentation on Education Time and Topics, please see Education Tab

## 2019-06-06 NOTE — CONSULTS
PRN   sodium chloride flush 0.9 % injection 10 mL 2 times per day   sodium chloride flush 0.9 % injection 10 mL PRN   magnesium hydroxide (MILK OF MAGNESIA) 400 MG/5ML suspension 30 mL Daily PRN   ondansetron (ZOFRAN) injection 4 mg Q6H PRN   acetaminophen (TYLENOL) tablet 650 mg Q4H PRN   ipratropium-albuterol (DUONEB) nebulizer solution 3 mL Q4H WA       Allergies:  Amoxicillin-pot clavulanate     Review of Systems:   Unable to obtain, pt lethargic      Objective   PHYSICAL EXAM:    Vitals:    06/06/19 0804   BP: (!) 170/96   Pulse: 89   Resp: 14   Temp: 98.4 °F (36.9 °C)   SpO2: 94%    Weight: 140 lb 14 oz (63.9 kg)         General Appearance:  lethargic   Head:  Normocephalic, without obvious abnormality, atraumatic   Eyes:  Unable to obtain, pt lethargic   Nose: Unable to obtain, pt lethargic   Throat: Lips, mucosa, and tongue dry   Neck: Supple, elev jvp   Lungs:   decrs bs, respirations unlabored   Chest Wall:  No deformity or tenderness   Heart:  irRegular rate and rhythm, S1, S2 normal, 2/6 sm   Abdomen:   Soft, Unable to fuly obtain, pt lethargic   Extremities: Extremities normal, atraumatic, no cyanosis or edema   Pulses: 2+ and symmetric in UE   Skin: Skin color, pale, nml temp   Pysch: Lethargic mood and affect, Unable to fully obtain, pt lethargic   Neurologic: Unable to obtain, pt lethargic        Labs   CBC: Lab Results   Component Value Date    WBC 9.6 06/03/2019    RBC 3.32 06/03/2019    HGB 11.1 06/03/2019    HCT 33.1 06/03/2019    MCV 99.5 06/03/2019    RDW 13.5 06/03/2019     06/03/2019     CMP:  Lab Results   Component Value Date     06/06/2019    K 3.5 06/06/2019    K 4.0 04/23/2019     06/06/2019    CO2 29 06/06/2019    BUN 27 06/06/2019    CREATININE 0.8 06/06/2019    GFRAA >60 06/06/2019    AGRATIO 0.9 06/03/2019    LABGLOM >60 06/06/2019    GLUCOSE 144 06/06/2019    PROT 7.1 06/03/2019    CALCIUM 9.0 06/06/2019    BILITOT 0.3 06/03/2019    ALKPHOS 77 06/03/2019    AST 17 06/03/2019    ALT 15 06/03/2019     PT/INR:  No results found for: PTINR  HgBA1c:No results found for: LABA1C  Lab Results   Component Value Date    TROPONINI <0.01 06/03/2019         Cardiac Data     Last EKG: afib, possible anter infarct , prev ekg was nsr     Echo:    Stress Test:    Cath:    Studies:   Ct chest    Impression   1. Negative CT angiogram for acute pulmonary embolism. 2. Bronchial wall thickening within the right lower lobe bronchi which could   indicate a bronchitis or sequelae of aspiration. Bela Bares is however no   pneumonia evident. 3. Small bilateral pleural effusions and atelectasis within the lower lobes. 4. Age indeterminate but likely late subacute/early chronic T7 compression   fracture. cxr       Impression   Findings suggest congestive heart failure               I have reviewed labs and imaging/xray/diagnostic testing in this note. Assessment and Plan          Patient Active Problem List   Diagnosis    Pneumonia    Severe sepsis ([address]    Depression    DMII (diabetes mellitus, type 2) ([address]    Acute respiratory failure with hypoxia ([address]    CHF (congestive heart failure), NYHA class I, acute on chronic, combined ([address]    Acute diastolic CHF (congestive heart failure) ([address]    A-fib ([address]    Dementia       Afib, appears to be new dx, agree w/ low dose eliquis, get echo rates are controlled, no need for cvn    Acute hf, unknown type, await echo, continue lasix    Htn, may need med adjustment, will wait on echo and decide on that thereafter        Thank you for allowing us to participate in the care of Charlsie Castleman. Please call me with any questions [phone].     Pooja Ochoa MD, [facility]   Interventional Cardiologist  [facility]  [phone] [facility]  [phone] [address] [place] [address]  6/6/2019 8:08 AM

## 2019-06-06 NOTE — PROGRESS NOTES
Report received from Abraham, [address] [place] [address]. Patient sleeping at this time and in no apparent distress. Afib on tele monitor. Maria draining appropriately. Left AC PIV saline locked. Call light and bedside table within reach, bed alarm on, restraints in place per orders for pulling lines out after failed placement of AVASYS monitoring, bed in lowest position and locked with 2/4 rails raised. RN will continue to monitor.

## 2019-06-06 NOTE — PROGRESS NOTES
Physical Therapy  Facility/Department: [facility] C[facility]U  Daily Treatment Note  NAME: Luis Alberto Key  : 1930  MRN: [id number]    Date of Service: 2019    Discharge Recommendations:  ECF with PT   PT Equipment Recommendations  Equipment Needed: No    Assessment   Body structures, Functions, Activity limitations: Decreased functional mobility ; Decreased strength;Decreased balance;Decreased endurance;Decreased cognition;Decreased safe awareness  Assessment: Pt participated with therapy fairly this afternoon, transferring OOB and successfully standing twice to RW with modA x 1. Able to amb short distance in room with RW and modA x 1 with lots of cueing needed to elicit stepping and for safety/direction. Pt's baseline cognitive impairments make consistent participation with therapy and carryover difficult. Recommend ECF with \"Part B\" PT services at D/C. Treatment Diagnosis: Generalized weaknes R53.1  Specific instructions for Next Treatment: Progress ther ex and mobility as tolerated  Prognosis: Fair  Decision Making: Medium Complexity  Patient Education: Role and goals of PT, techniques for bed mobility, transfer techniques with walker, LE ther ex, POC - pt demonstrates minimal understanding & requires lots of reinforcement  Barriers to Learning: Impaired cognition  REQUIRES PT FOLLOW UP: Yes  Activity Tolerance: Patient Tolerated treatment well;Patient limited by cognitive status; Patient limited by fatigue  Activity Tolerance: O2 sat = 95% at rest and 96% after performing functional mobility tasks (including sit<>stand and amb), while on 3.5L O2. Patient Diagnosis(es): The primary encounter diagnosis was Congestive heart failure, unspecified HF chronicity, unspecified heart failure type ([facility] Diagnoses of Hospital-acquired pneumonia, Pneumonia of right lower lobe due to infectious organism [facility]), Shortness of breath, and Cough were also pertinent to this visit.      has a past medical history of CAD Assistance(from EOB to RW (performed 2 reps))  Stand to sit: Moderate Assistance(from RW to EOB)  Bed to Chair: Unable to assess(RN requests to transfer pt back to bed after therapy session due to confusion with need for B wrist restraints)     Ambulation  Surface: level tile  Device: Rolling Walker  Other Apparatus: O2(3.5L O2)  Assistance: Moderate assistance  Quality of Gait: Pt amb with very slow haylie with short, shuffling steps with limited foot clearance. Moderately unsteady with cues needed elicit forward and backward stepping. Gait Deviations: Slow Haylie; Shuffles;Decreased step length;Decreased step height;Decreased head and trunk rotation  Distance: x 4 feet forward, x 4 feet backward  Comments: Amb distance limited by LE weakness. Balance  Posture: Fair  Sitting - Static: Fair  Sitting - Dynamic: Fair;-  Standing - Static: Fair;-  Standing - Dynamic: Poor;+     Exercises  Comments: Deferred LE ther ex due to increasing c/o fatigue and irritability as session progressed. AM-PAC Score  AM-PAC Inpatient Mobility without Stair Climbing Raw Score : 10 (06/06/19 1623)  AM-PAC Inpatient without Stair Climbing T-Scale Score : 34.07 (06/06/19 1623)  Mobility Inpatient CMS 0-100% Score: 71.66 (06/06/19 1623)  Mobility Inpatient without Stair CMS G-Code Modifier : CL (06/06/19 1623)    Goals  Short term goals  Time Frame for Short term goals: 1 week, 6/11/19 (unless otherwise specified)  Short term goal 1: Pt will complete supine<>sit with moderate assistance  Short term goal 2: Pt will transfer sit<>stand with moderate assistance - MET 6/06/19. Goal upgraded: Pt will transfer sit<>stand with Bushra x 1.   Short term goal 3: Pt will transfer bed<>chair with moderate assistance using RW  Short term goal 4: Pt will ambulate 13' with RW with moderate assistance  Short term goal 5: By 6/07/19: Pt will participate in 12 reps of supine and seated LE exercise for LE strengthening and ROM  Patient Goals Patient goals : Pt unable to voice goals when asked 2* cognitive impairments    Plan    Times per week: 2-4x/week in acute care  Times per day: Daily  Specific instructions for Next Treatment: Progress ther ex and mobility as tolerated  Current Treatment Recommendations: Strengthening, Balance Training, Functional Mobility Training, Endurance Training, Gait Training, Safety Education & Training, Patient/Caregiver Education & Training, Equipment Evaluation, Education, & procurement, Positioning  Safety Devices  Type of devices: Call light within reach, Nurse notified, Gait belt, Patient at risk for falls, All fall risk precautions in place, Bed alarm in place, Left in bed, Telesitter in use  Restraints  Initially in place: Yes  Restraints: B wrist restraints -- reapplied at end of therapy session     Therapy Time   Individual Concurrent Group Co-treatment   Time In 1445         Time Out 1509         Minutes 24         Timed Code Treatment Minutes: [address] [place], [place], [address]

## 2019-06-06 NOTE — PROGRESS NOTES
RN has made multiple attempts to feed the patient- she is very lethargic and falls back to sleep after being verbally aroused. RN will continue to monitor and encourage PO fluids and food.

## 2019-06-06 NOTE — PLAN OF CARE
Patient will remain free from falls this shift. Call light within reach, bedside table within reach, bed in lowest position, bed alarm on, 2/4 rails raised, bed in locked position, phone within patient's reach, non-skid socks on, fall risk wristband applied. Patient instructed to call out if she needs to get up or needs any assistance, RN instructed patient not to ambulate without assistance- patient confused, AVASYS in place to monitor. Patient verbalized understanding. RN will continue to monitor.

## 2019-06-06 NOTE — PROGRESS NOTES
RESPIRATORY THERAPY ASSESSMENT    Name:  Winfield Nageotte  Medical Record Number:  [id number]  Age: 80 y.o. Gender: female  : 1930  Today's Date:  2019  Room:  [facility]042-01    Assessment     Is the patient being admitted for a COPD or Asthma exacerbation? No   (If yes the patient will be seen every 4 hours for the first 24 hours and then reassessed)    Patient Admission Diagnosis      Allergies  Allergies   Allergen Reactions    Amoxicillin-Pot Clavulanate        Minimum Predicted Vital Capacity:     821          Actual Vital Capacity:      Unable to do              Pulmonary History:COPD,former smoker  Home Oxygen Therapy:  room air  Home Respiratory Therapy:Albuterol/Ipratropium Bromide HHN   Current Respiratory Therapy:  Duoneb Q4WA  Treatment Type: HHN  Medications: Albuterol/Ipratropium    Respiratory Severity Index(RSI)   Patients with orders for inhalation medications, oxygen, or any therapeutic treatment modality will be placed on Respiratory Protocol. They will be assessed with the first treatment and at least every 72 hours thereafter. The following severity scale will be used to determine frequency of treatment intervention.     Smoking History: Pulmonary Disease or Smoking History, Greater than 15 pack year = 2    Social History  Social History     Tobacco Use    Smoking status: Former Smoker    Smokeless tobacco: Never Used   Substance Use Topics    Alcohol use: No    Drug use: Not on file       Recent Surgical History: None = 0  Past Surgical History  Past Surgical History:   Procedure Laterality Date    BLEPHAROPLASTY Bilateral     EYE SURGERY Bilateral     cataracts       Level of Consciousness: Disoriented and Uncooperative = 2    Level of Activity: Non weight bearing- transfers bed to chair only = 3    Respiratory Pattern: Regular Pattern; RR 8-20 = 0    Breath Sounds: Clear = 0    Sputum   ,  , Sputum How Obtained: None  Cough: Strong, spontaneous, non-productive = 0    Vital Signs   /88   Pulse 89   Temp 98.2 °F (36.8 °C) (Axillary)   Resp 14   Ht 5' 4\" (1.626 m)   Wt 140 lb 14 oz (63.9 kg)   SpO2 92%   BMI 24.18 kg/m²   SPO2 (COPD values may differ): 90-91% on room air or greater than 92% on FiO2 24- 28% = 1    Peak Flow (asthma only): not applicable = 0    RSI: 7-8 = BID and Q4HPRN (every four hours as needed) for dyspnea        Plan       Goals: medication delivery, mobilize retained secretions, volume expansion and improve oxygenation    Patient/caregiver was educated on the proper method of use for Respiratory Care Devices:  yes      Level of patient/caregiver understanding able to:   ? Verbalize understanding   ? Demonstrate understanding       ? Teach back        ? Needs reinforcement       ? No available caregiver               ? Other:     Response to education:  [address]     Is patient being placed on Home Treatment Regimen? No     Does the patient have everything they need prior to discharge? NA     Comments: chart reviewed    Plan of Care: Duoneb BID and Q4PRN    Electronically signed by Jeff Sarmiento RCP on 6/6/2019 at 7:16 PM    Respiratory Protocol Guidelines     1. Assessment and treatment by Respiratory Therapy will be initiated for medication and therapeutic interventions upon initiation of aerosolized medication. 2. Physician will be contacted for respiratory rate (RR) greater than 35 breaths per minute. Therapy will be held for heart rate (HR) greater than 140 beats per minute, pending direction from physician. 3. Bronchodilators will be administered via Metered Dose Inhaler (MDI) with spacer when the following criteria are met:  a. Alert and cooperative     b. HR < 140 bpm  c. RR < 30 bpm                d. Can demonstrate a 2-3 second inspiratory hold  4. Bronchodilators will be administered via Hand Held Nebulizer [facility]) to patients when ANY of the following criteria are met  a. Incognizant or uncooperative          b.  Patients treated with HHN at Home        c. Unable to demonstrate proper use of MDI with spacer     d. RR > 30 bpm   5. Bronchodilators will be delivered via Metered Dose Inhaler (MDI), HHN, Aerogen to intubated patients on mechanical ventilation. 6. Inhalation medication orders will be delivered and/or substituted as outlined below. Aerosolized Medications Ordering and Administration Guidelines:    1. All Medications will be ordered by a physician, and their frequency and/or modality will be adjusted as defined by the patients Respiratory Severity Index (RSI) score. 2. If the patient does not have documented COPD, consider discontinuing anticholinergics when RSI is less than 9.  3. If the bronchospasm worsens (increased RSI), then the bronchodilator frequency can be increased to a maximum of every 4 hours. If greater than every 4 hours is required, the physician will be contacted. 4. If the bronchospasm improves, the frequency of the bronchodilator can be decreased, based on the patient's RSI, but not less than home treatment regimen frequency. 5. Bronchodilator(s) will be discontinued if patient has a RSI less than 9 and has received no scheduled or as needed treatment for 72  Hrs. Patients Ordered on a Mucolytic Agent:    1. Must always be administered with a bronchodilator. 2. Discontinue if patient experiences worsened bronchospasm, or secretions have lessened to the point that the patient is able to clear them with a cough. Anti-inflammatory and Combination Medications:    1. If the patient lacks prior history of lung disease, is not using inhaled anti-inflammatory medication at home, and lacks wheezing by examination or by history for at least 24 hours, contact physician for possible discontinuation.

## 2019-06-06 NOTE — PROGRESS NOTES
Occupational Therapy/Physical Therapy  Hold OT/PT session. RN recommended to hold this am. Pt is resting quietly after restless night. PT/OT may check later per RN.   Heraclio Gibbs OTR/ALKA Roldan PT, DPT

## 2019-06-06 NOTE — CARE COORDINATION
[address] Transitions Interview-BPCI    2019    Patient: Lorraine Haddad Patient : 1930   MRN: [id number]  Reason for Admission: CHF,   RARS: Readmission Risk Score: 17    Met with patient for care transition visit. Patient was sleeping but easily aroused when I spoke her name but did not interact at all with CTC. Would not respond verbally to any question/comment CTC made to her. Information listed in assessment obtained through record review. Patient lives in The [address] [place] [address] and does have dementia. Therapy has recommended SNF.  left for , Alhaji Partida RN regarding above and requested she call me back to discuss further. [facility] Beneficiary Notification Letter left at bedside. If patient meets criteria at bedside will include in [address] transition program.         Readmission Risk  Patient Active Problem List   Diagnosis    Pneumonia    Severe sepsis ([address]    Depression    DMII (diabetes mellitus, type 2) ([address]    Acute respiratory failure with hypoxia ([address]    CHF (congestive heart failure), NYHA class I, acute on chronic, combined ([address]    Acute diastolic CHF (congestive heart failure) ([address]    A-fib ([address]    Dementia       Inpatient Assessment  Care Transitions Summary    Care Transitions Inpatient Review  Medication Review  Housing Review  Who do you live with?:  Alone  Social Support  Durable Medical Equipment  Patient DME:  Stef Ho  Patient Home Equipment:  Nebulizer  Functional Review  Hearing and Vision  Visual Impairment:  Visual impairment (Glasses/contacts)  Hearing Impairment:  Hard of hearing, Wears hearing aids  Care Transitions Interventions         Follow Up  No future appointments. Health Maintenance  There are no preventive care reminders to display for this patient.     Francisca Martinez RN

## 2019-06-07 VITALS
DIASTOLIC BLOOD PRESSURE: 84 MMHG | OXYGEN SATURATION: 96 % | TEMPERATURE: 98.8 F | HEART RATE: 86 BPM | SYSTOLIC BLOOD PRESSURE: 123 MMHG | BODY MASS INDEX: 23.11 KG/M2 | HEIGHT: 64 IN | WEIGHT: 135.36 LBS | RESPIRATION RATE: 16 BRPM

## 2019-06-07 LAB
AMORPHOUS: ABNORMAL /HPF
ANION GAP SERPL CALCULATED.3IONS-SCNC: 14 MMOL/L (ref 3–16)
BILIRUBIN URINE: ABNORMAL
BLOOD, URINE: ABNORMAL
BUN BLDV-MCNC: 26 MG/DL (ref 7–20)
CALCIUM SERPL-MCNC: 9.4 MG/DL (ref 8.3–10.6)
CASTS: ABNORMAL /LPF
CHLORIDE BLD-SCNC: 97 MMOL/L (ref 99–110)
CLARITY: CLEAR
CO2: 24 MMOL/L (ref 21–32)
COLOR: YELLOW
CREAT SERPL-MCNC: 0.8 MG/DL (ref 0.6–1.2)
EPITHELIAL CELLS, UA: ABNORMAL /HPF
GFR AFRICAN AMERICAN: >60
GFR NON-AFRICAN AMERICAN: >60
GLUCOSE BLD-MCNC: 125 MG/DL (ref 70–99)
GLUCOSE URINE: NEGATIVE MG/DL
KETONES, URINE: NEGATIVE MG/DL
LEUKOCYTE ESTERASE, URINE: NEGATIVE
MAGNESIUM: 2.4 MG/DL (ref 1.8–2.4)
MICROSCOPIC EXAMINATION: YES
MUCUS: ABNORMAL /LPF
NITRITE, URINE: NEGATIVE
PH UA: 6 (ref 5–8)
POTASSIUM SERPL-SCNC: 4.1 MMOL/L (ref 3.5–5.1)
PRO-BNP: 3392 PG/ML (ref 0–449)
PROTEIN UA: ABNORMAL MG/DL
RBC UA: ABNORMAL /HPF (ref 0–2)
SODIUM BLD-SCNC: 135 MMOL/L (ref 136–145)
SPECIFIC GRAVITY UA: 1.02 (ref 1–1.03)
URINE REFLEX TO CULTURE: ABNORMAL
URINE TYPE: ABNORMAL
UROBILINOGEN, URINE: 2 E.U./DL
WBC UA: ABNORMAL /HPF (ref 0–5)

## 2019-06-07 PROCEDURE — 94761 N-INVAS EAR/PLS OXIMETRY MLT: CPT

## 2019-06-07 PROCEDURE — 2580000003 HC RX 258: Performed by: INTERNAL MEDICINE

## 2019-06-07 PROCEDURE — 36415 COLL VENOUS BLD VENIPUNCTURE: CPT

## 2019-06-07 PROCEDURE — 81001 URINALYSIS AUTO W/SCOPE: CPT

## 2019-06-07 PROCEDURE — 83880 ASSAY OF NATRIURETIC PEPTIDE: CPT

## 2019-06-07 PROCEDURE — 6370000000 HC RX 637 (ALT 250 FOR IP): Performed by: INTERNAL MEDICINE

## 2019-06-07 PROCEDURE — 83735 ASSAY OF MAGNESIUM: CPT

## 2019-06-07 PROCEDURE — 6370000000 HC RX 637 (ALT 250 FOR IP): Performed by: NURSE PRACTITIONER

## 2019-06-07 PROCEDURE — 94640 AIRWAY INHALATION TREATMENT: CPT

## 2019-06-07 PROCEDURE — 2700000000 HC OXYGEN THERAPY PER DAY

## 2019-06-07 PROCEDURE — 80048 BASIC METABOLIC PNL TOTAL CA: CPT

## 2019-06-07 RX ORDER — FUROSEMIDE 40 MG/1
40 TABLET ORAL DAILY
Qty: 60 TABLET | Refills: 3 | Status: SHIPPED | OUTPATIENT
Start: 2019-06-08

## 2019-06-07 RX ORDER — POTASSIUM CHLORIDE 750 MG/1
10 TABLET, EXTENDED RELEASE ORAL DAILY
Qty: 30 TABLET | Refills: 0 | Status: SHIPPED | OUTPATIENT
Start: 2019-06-07

## 2019-06-07 RX ADMIN — Medication 10 ML: at 09:39

## 2019-06-07 RX ADMIN — ASPIRIN 81 MG: 81 TABLET, COATED ORAL at 09:38

## 2019-06-07 RX ADMIN — CARVEDILOL 3.12 MG: 3.12 TABLET, FILM COATED ORAL at 09:37

## 2019-06-07 RX ADMIN — DESVENLAFAXINE SUCCINATE 100 MG: 50 TABLET, FILM COATED, EXTENDED RELEASE ORAL at 09:37

## 2019-06-07 RX ADMIN — FUROSEMIDE 40 MG: 40 TABLET ORAL at 09:38

## 2019-06-07 RX ADMIN — APIXABAN 2.5 MG: 2.5 TABLET, FILM COATED ORAL at 09:37

## 2019-06-07 RX ADMIN — IPRATROPIUM BROMIDE AND ALBUTEROL SULFATE 3 ML: .5; 3 SOLUTION RESPIRATORY (INHALATION) at 07:18

## 2019-06-07 ASSESSMENT — PAIN SCALES - GENERAL
PAINLEVEL_OUTOF10: 0

## 2019-06-07 NOTE — PLAN OF CARE
Staff monitoring the patient's intake and output, completing daily weights, limiting sodium intake and teaching according to the patient's CHF needs.

## 2019-06-07 NOTE — PROGRESS NOTES
Occupational Therapy  OT follow up attempted, pt combative this date, not appropriate for OT at this time. Will continue to follow per POC.    JOHN Deluna/L

## 2019-06-07 NOTE — PROGRESS NOTES
Patient's EF (Ejection Fraction) is greater than 40%    Patient's weights and intake/output reviewed:    Patient's Last Weight: 135lb 5.8oz obtained by bed scale. Today's weight is noted to be 5lbs 8.2oz  less than last documented weight. Intake/Output Summary (Last 24 hours) at 6/7/2019 0832  Last data filed at 6/7/2019 0500  Gross per 24 hour   Intake 40 ml   Output 1795 ml   Net -1755 ml       Patient's current functional capacity:  Slight limitation of physical activity. Comfortable at rest. Ordinary physical activity results in fatigue, palpitation, dyspnea. Pt resting in bed at this time on 2 L O2. Pt denies shortness of breath. Pt without lower extremity edema. Patient and family's stated goal of care: reduce shortness of breath, increase activity tolerance, better understand heart failure and disease management, be more comfortable and reduce lower extremity edema prior to discharge    Patient is confused, has dementia and having difficulty understanding any teaching. Patient has a past medical history of CAD (coronary artery disease), COPD (chronic obstructive pulmonary disease) ([address] Depression, Diabetes mellitus ([address] GERD (gastroesophageal reflux disease), Hyperlipidemia, Hypertension, Mood disorder ([address] Osteoarthritis, Osteoporosis, and Spondylolysis. Comorbidities reviewed and education provided.     >>For CHF and Comorbidity documentation on Education Time and Topics, please see Education Tab

## 2019-06-07 NOTE — CARE COORDINATION
CASE MANAGEMENT DISCHARGE SUMMARY      Discharge to: The [facility] assisted living. Per Abdelrahman Soliman they have RN on duty at all times and they plan to evaluate patient to assess if she is \" too high level ' for assisted living and needs to be transitioned into their ECF. Both son and facility are agreeable. Patient has financial resources to pay privately and facility has ability to transfer to [facility] from assisted living. Precertification completed: N/A  Hospital Exemption Notification (HENS) completed: N/A    New Durable Medical Equipment ordered/agency: Defer to assisted living /ECF at [facility]:    Medical Transport explained to [employer]. Pt/family voice no agency preference. No preference  Agency used: [facility]ont[facility] up time: 5 PM   Ambulance form completed: Yes    Notified:    Family: Kelsie Cramer   Facility/Agency: [facility] faxed to [phone]   RN: Elsy Busby   Phone number for report to facility: [phone]    Note: Discharging nurse to complete JOHN, reconcile AVS, and place final copy with patient's discharge packet. RN to ensure that written prescriptions for  Level II medications are sent with patient to the facility as per protocol.

## 2019-06-07 NOTE — DISCHARGE INSTR - COC
Encounters:   06/07/19 135 lb 5.8 oz (61.4 kg)     Mental Status:  disoriented and alert    IV Access:  - None    Nursing Mobility/ADLs:  Walking   Assisted  Transfer  Dependent  Bathing  Assisted  Dressing  Assisted  Toileting  Dependent  Feeding  Dependent  Med Admin  Assisted  Med Delivery   crushed and pudding    Wound Care Documentation and Therapy:        Elimination:  Continence:   · Bowel: No  · Bladder: No  Urinary Catheter: Removal Date 6/7/19 1630   Colostomy/Ileostomy/Ileal Conduit: No       Date of Last BM: 6/6/19    Intake/Output Summary (Last 24 hours) at 6/7/2019 1454  Last data filed at 6/7/2019 1404  Gross per 24 hour   Intake 70 ml   Output 975 ml   Net -905 ml     I/O last 3 completed shifts: In: 36 [P.O.:30; I.V.:10]  Out: 1795 [Urine:1795]    Safety Concerns:     Sundowners [place] and At Risk for Falls    Impairments/Disabilities:      Vision and Hearing    Nutrition Therapy:  Current Nutrition Therapy:   - Oral Diet:  General dental soft; pureed    Routes of Feeding: Oral  Liquids: No Restrictions  Daily Fluid Restriction: yes - amount 2,000ml  Last Modified Barium Swallow with Video (Video Swallowing Test): not done    Treatments at the Time of Hospital Discharge:   Respiratory Treatments: n/a  Oxygen Therapy:  is on oxygen at 2 L/min per nasal cannula.   Ventilator:    - No ventilator support    Rehab Therapies: Physical Therapy and Occupational Therapy  Weight Bearing Status/Restrictions: Partial weight bearing (30-50%) only on leg left leg  Other Medical Equipment (for information only, NOT a DME order):  walker  Other Treatments: n/a    Patient's personal belongings (please select all that are sent with patient):  Glasses, Hearing Aides bilateral, Dentures upper and lower    RN SIGNATURE:  Electronically signed by Damian Green RN on 6/7/19 at 3:50 PM    CASE MANAGEMENT/SOCIAL WORK SECTION    Inpatient Status Date: 6/5/19    Readmission Risk Assessment Score:  Readmission Risk

## 2019-06-07 NOTE — PROGRESS NOTES
Discharge orders placed by MD, verified by RN prior to discharge.  denies any further discharge needs for the patient. Discharge paperwork completed- AVS in chart for [facility]. A copy of all paperwork in chart. Son at bedside for transfer to the [facility]. RN removed the tele box, informed [facility] of discharge, and removed IV prior to discharge. Patient discharged to the [facility], transferred via [address] RN [facility].

## 2019-06-07 NOTE — CARE COORDINATION
2019    Patient: Aidan Castano Patient : 1930   MRN: [id number]  Reason for Admission: CHF,   RARS: Readmission Risk Score: 16    nHpredict outcome report received, attempted to review with patient but she was unable to comprehend d/t cognitive deficits. Copy of report left at bedside.       Nito Byrne RN

## 2019-06-07 NOTE — PROGRESS NOTES
Patient lethargic but easily aroused via verbal stimuli, in no distress. VSS and assessment completed. Afib on tele monitor-rate controlled. SpO2 wnl on 2L via NC. Maria in place and draining appropriately. Left AC PIV normal saline locked. Bilateral wrist restraints continued for safety. Call light and bedside table within reach, bed alarm on, bed in lowest position and locked with 3/4 rails raised. AVASYS monitoring patient. RN will continue to monitor.

## 2019-06-08 ENCOUNTER — CARE COORDINATION (OUTPATIENT)
Dept: CASE MANAGEMENT | Age: 84
End: 2019-06-08

## 2019-06-08 LAB
BLOOD CULTURE, ROUTINE: NORMAL
CULTURE, BLOOD 2: NORMAL

## 2019-06-08 NOTE — CARE COORDINATION
Andrew 45 Transitions Initial Follow Up Call    Call within 2 business days of discharge: Yes    Patient: Ana Maria Zambrano Patient : 1930   MRN: <[id number]>  Reason for Admission: CHF  Discharge Date: 19 RARS: Readmission Risk Score: 16      Last Discharge [facility]       Complaint Diagnosis Description Type Department Provider    6/3/19 Shortness of Breath Congestive heart failure, unspecified HF chronicity, unspecified heart failure type ([facility] [address] . .. ED to Hosp-Admission (Discharged) (ADMITTED) Roma Anne MD; Catrina Templeton. .. Spoke with: Tre Fregoso & \"Qi\" @ [facility] [phone]    Facility: [address] [place] [address] Transitions 24 Hour Call    Patient DME:  Josias Ash, Wheelchair  Patient Home Equipment:  Nebulizer  Do you have support at home?:  Alone  Care Transitions Interventions         Follow Up: BPCI-A: [facility] contacted  at home contact number, he said that she was at [facility]. [facility] attempted to get her direct phone number in the [facility],  said that CTC should \"call the nurses station because she likes to sit out there, she is never in her room\"  CTC contacted [facility]" reported that patient is getting ready for the day. [facility] left contact information and requested that patient return call at her convenience. CTC will remain available. No future appointments.     Piper Casanova RN

## 2019-06-09 NOTE — CARE COORDINATION
Andrew 45 Transitions Initial Follow Up Call    Call within 2 business days of discharge: Yes    Patient: Luis Alberto Key Patient : 1930   MRN: <[id number]>  Reason for Admission: CHF  Discharge Date: 19 RARS: Readmission Risk Score: 16      Last Discharge [facility]       Complaint Diagnosis Description Type Department Provider    6/3/19 Shortness of Breath Congestive heart failure, unspecified HF chronicity, unspecified heart failure type ([address] . .. ED to Hosp-Admission (Discharged) (ADMITTED) Mirian Chavez MD; Ale Ulloa. .. Spoke with: Shari Peace" nurse @ The [address]: Lena Giles services provided:N/A      Care Transitions 24 Hour Call    Patient DME:  [facility], Wheelchair  Patient Home Equipment:  Nebulizer  Do you have support at home?:  Alone  Care Transitions Interventions         Follow Up: BPCI-A: [facility] contacted [facility], spoke with the patient's nurse \"Elza\". \"Elza\" informed [facility] that the patient is not cognitively capable of speaking with [facility] for a discharge call. She reports that patient is doing \"OK\", but she is not wearing her O2 so her SATS are dropping. CTC inquired whether patient was in skilled or assisted, confirmed that she is in assisted per son who wants to try assisted before moving patient to skilled. CTC will transition to Central Team for follow up. No future appointments.     Bertrand Lund RN

## 2019-06-10 ENCOUNTER — FOLLOWUP TELEPHONE ENCOUNTER (OUTPATIENT)
Dept: CARDIAC REHAB | Age: 84
End: 2019-06-10

## 2019-06-10 NOTE — TELEPHONE ENCOUNTER
2nd Attempt; Staff at the [facility] are still not available to speak to CHF nurse. Non urgent CHF resource number again made available.

## 2019-06-10 NOTE — TELEPHONE ENCOUNTER
1st Attempt from CHF nurse. Message left with Janelle -  at [facility]. Staff nurses are not available to discuss case for hospital follow up on her heart failure condition. Message left for staff to return call to HF Voicemail phone number.

## 2019-06-18 DIAGNOSIS — M25.562 LEFT KNEE PAIN, UNSPECIFIED CHRONICITY: Primary | ICD-10-CM

## 2019-07-02 DIAGNOSIS — M25.562 LEFT KNEE PAIN, UNSPECIFIED CHRONICITY: Primary | ICD-10-CM

## 2019-07-09 ENCOUNTER — APPOINTMENT (OUTPATIENT)
Dept: GENERAL RADIOLOGY | Age: 84
DRG: 683 | End: 2019-07-09
Payer: MEDICARE

## 2019-07-09 ENCOUNTER — APPOINTMENT (OUTPATIENT)
Dept: CT IMAGING | Age: 84
DRG: 683 | End: 2019-07-09
Payer: MEDICARE

## 2019-07-09 ENCOUNTER — HOSPITAL ENCOUNTER (INPATIENT)
Age: 84
LOS: 3 days | Discharge: NURSING, REHAB OR LONG-TERM CARE | DRG: 683 | End: 2019-07-12
Attending: EMERGENCY MEDICINE | Admitting: INTERNAL MEDICINE
Payer: MEDICARE

## 2019-07-09 DIAGNOSIS — S59.902A INJURY OF LEFT ELBOW, INITIAL ENCOUNTER: ICD-10-CM

## 2019-07-09 DIAGNOSIS — N17.9 AKI (ACUTE KIDNEY INJURY) (HCC): Primary | ICD-10-CM

## 2019-07-09 DIAGNOSIS — S49.92XA INJURY OF LEFT SHOULDER, INITIAL ENCOUNTER: ICD-10-CM

## 2019-07-09 DIAGNOSIS — R77.8 ELEVATED TROPONIN: ICD-10-CM

## 2019-07-09 DIAGNOSIS — R47.01 APHASIA: ICD-10-CM

## 2019-07-09 LAB
A/G RATIO: 1.2 (ref 1.1–2.2)
ABO/RH: NORMAL
ACETAMINOPHEN LEVEL: <5 UG/ML (ref 10–30)
ALBUMIN SERPL-MCNC: 3.2 G/DL (ref 3.4–5)
ALP BLD-CCNC: 58 U/L (ref 40–129)
ALT SERPL-CCNC: 8 U/L (ref 10–40)
AMPHETAMINE SCREEN, URINE: NORMAL
ANION GAP SERPL CALCULATED.3IONS-SCNC: 12 MMOL/L (ref 3–16)
ANTIBODY SCREEN: NORMAL
AST SERPL-CCNC: 13 U/L (ref 15–37)
BARBITURATE SCREEN URINE: NORMAL
BASOPHILS ABSOLUTE: 0.1 K/UL (ref 0–0.2)
BASOPHILS RELATIVE PERCENT: 0.7 %
BENZODIAZEPINE SCREEN, URINE: NORMAL
BILIRUB SERPL-MCNC: 0.6 MG/DL (ref 0–1)
BILIRUBIN URINE: NEGATIVE
BLOOD, URINE: NEGATIVE
BUN BLDV-MCNC: 28 MG/DL (ref 7–20)
CALCIUM SERPL-MCNC: 9 MG/DL (ref 8.3–10.6)
CANNABINOID SCREEN URINE: NORMAL
CHLORIDE BLD-SCNC: 95 MMOL/L (ref 99–110)
CLARITY: CLEAR
CO2: 29 MMOL/L (ref 21–32)
COCAINE METABOLITE SCREEN URINE: NORMAL
COLOR: YELLOW
CREAT SERPL-MCNC: 1.6 MG/DL (ref 0.6–1.2)
EOSINOPHILS ABSOLUTE: 0 K/UL (ref 0–0.6)
EOSINOPHILS RELATIVE PERCENT: 0.1 %
ETHANOL: NORMAL MG/DL (ref 0–0.08)
GFR AFRICAN AMERICAN: 37
GFR NON-AFRICAN AMERICAN: 30
GLOBULIN: 2.6 G/DL
GLUCOSE BLD-MCNC: 105 MG/DL
GLUCOSE BLD-MCNC: 105 MG/DL
GLUCOSE BLD-MCNC: 105 MG/DL (ref 70–99)
GLUCOSE BLD-MCNC: 128 MG/DL (ref 70–99)
GLUCOSE URINE: NEGATIVE MG/DL
HCT VFR BLD CALC: 36.8 % (ref 36–48)
HEMOGLOBIN: 12.3 G/DL (ref 12–16)
INR BLD: 1.3 (ref 0.86–1.14)
KETONES, URINE: NEGATIVE MG/DL
LACTIC ACID: 1.4 MMOL/L (ref 0.4–2)
LEUKOCYTE ESTERASE, URINE: NEGATIVE
LYMPHOCYTES ABSOLUTE: 1.8 K/UL (ref 1–5.1)
LYMPHOCYTES RELATIVE PERCENT: 15.2 %
Lab: NORMAL
MCH RBC QN AUTO: 32.7 PG (ref 26–34)
MCHC RBC AUTO-ENTMCNC: 33.3 G/DL (ref 31–36)
MCV RBC AUTO: 98.2 FL (ref 80–100)
METHADONE SCREEN, URINE: NORMAL
MICROSCOPIC EXAMINATION: NORMAL
MONOCYTES ABSOLUTE: 0.6 K/UL (ref 0–1.3)
MONOCYTES RELATIVE PERCENT: 5.4 %
NEUTROPHILS ABSOLUTE: 9.1 K/UL (ref 1.7–7.7)
NEUTROPHILS RELATIVE PERCENT: 78.6 %
NITRITE, URINE: NEGATIVE
OPIATE SCREEN URINE: NORMAL
OXYCODONE URINE: NORMAL
PDW BLD-RTO: 13.9 % (ref 12.4–15.4)
PERFORMED ON: ABNORMAL
PH UA: 7.5
PH UA: 7.5 (ref 5–8)
PHENCYCLIDINE SCREEN URINE: NORMAL
PLATELET # BLD: 201 K/UL (ref 135–450)
PMV BLD AUTO: 9.6 FL (ref 5–10.5)
POTASSIUM REFLEX MAGNESIUM: 4.4 MMOL/L (ref 3.5–5.1)
PROPOXYPHENE SCREEN: NORMAL
PROTEIN UA: NEGATIVE MG/DL
PROTHROMBIN TIME: 14.8 SEC (ref 9.8–13)
RBC # BLD: 3.75 M/UL (ref 4–5.2)
REASON FOR REJECTION: NORMAL
REJECTED TEST: NORMAL
SALICYLATE, SERUM: <0.3 MG/DL (ref 15–30)
SODIUM BLD-SCNC: 136 MMOL/L (ref 136–145)
SPECIFIC GRAVITY UA: 1.01 (ref 1–1.03)
TOTAL PROTEIN: 5.8 G/DL (ref 6.4–8.2)
TROPONIN: 0.02 NG/ML
URINE TYPE: NORMAL
UROBILINOGEN, URINE: 0.2 E.U./DL
WBC # BLD: 11.5 K/UL (ref 4–11)

## 2019-07-09 PROCEDURE — 71250 CT THORAX DX C-: CPT

## 2019-07-09 PROCEDURE — 1200000000 HC SEMI PRIVATE

## 2019-07-09 PROCEDURE — 93005 ELECTROCARDIOGRAM TRACING: CPT | Performed by: EMERGENCY MEDICINE

## 2019-07-09 PROCEDURE — 2580000003 HC RX 258: Performed by: INTERNAL MEDICINE

## 2019-07-09 PROCEDURE — 99291 CRITICAL CARE FIRST HOUR: CPT

## 2019-07-09 PROCEDURE — 73070 X-RAY EXAM OF ELBOW: CPT

## 2019-07-09 PROCEDURE — 2580000003 HC RX 258: Performed by: EMERGENCY MEDICINE

## 2019-07-09 PROCEDURE — G0480 DRUG TEST DEF 1-7 CLASSES: HCPCS

## 2019-07-09 PROCEDURE — 85025 COMPLETE CBC W/AUTO DIFF WBC: CPT

## 2019-07-09 PROCEDURE — 84484 ASSAY OF TROPONIN QUANT: CPT

## 2019-07-09 PROCEDURE — 70496 CT ANGIOGRAPHY HEAD: CPT

## 2019-07-09 PROCEDURE — 71045 X-RAY EXAM CHEST 1 VIEW: CPT

## 2019-07-09 PROCEDURE — 70450 CT HEAD/BRAIN W/O DYE: CPT

## 2019-07-09 PROCEDURE — 36415 COLL VENOUS BLD VENIPUNCTURE: CPT

## 2019-07-09 PROCEDURE — 86850 RBC ANTIBODY SCREEN: CPT

## 2019-07-09 PROCEDURE — 83605 ASSAY OF LACTIC ACID: CPT

## 2019-07-09 PROCEDURE — 86901 BLOOD TYPING SEROLOGIC RH(D): CPT

## 2019-07-09 PROCEDURE — 73030 X-RAY EXAM OF SHOULDER: CPT

## 2019-07-09 PROCEDURE — 6370000000 HC RX 637 (ALT 250 FOR IP): Performed by: INTERNAL MEDICINE

## 2019-07-09 PROCEDURE — 81003 URINALYSIS AUTO W/O SCOPE: CPT

## 2019-07-09 PROCEDURE — 80053 COMPREHEN METABOLIC PANEL: CPT

## 2019-07-09 PROCEDURE — 85610 PROTHROMBIN TIME: CPT

## 2019-07-09 PROCEDURE — 6360000004 HC RX CONTRAST MEDICATION: Performed by: EMERGENCY MEDICINE

## 2019-07-09 PROCEDURE — 80307 DRUG TEST PRSMV CHEM ANLYZR: CPT

## 2019-07-09 PROCEDURE — 86900 BLOOD TYPING SEROLOGIC ABO: CPT

## 2019-07-09 RX ORDER — DESVENLAFAXINE 50 MG/1
100 TABLET, EXTENDED RELEASE ORAL EVERY OTHER DAY
Status: DISCONTINUED | OUTPATIENT
Start: 2019-07-09 | End: 2019-07-12 | Stop reason: HOSPADM

## 2019-07-09 RX ORDER — SODIUM CHLORIDE 0.9 % (FLUSH) 0.9 %
10 SYRINGE (ML) INJECTION PRN
Status: DISCONTINUED | OUTPATIENT
Start: 2019-07-09 | End: 2019-07-12 | Stop reason: HOSPADM

## 2019-07-09 RX ORDER — DIVALPROEX SODIUM 125 MG/1
125 TABLET, DELAYED RELEASE ORAL 2 TIMES DAILY
COMMUNITY

## 2019-07-09 RX ORDER — ATORVASTATIN CALCIUM 10 MG/1
10 TABLET, FILM COATED ORAL DAILY
COMMUNITY

## 2019-07-09 RX ORDER — SODIUM CHLORIDE 0.9 % (FLUSH) 0.9 %
10 SYRINGE (ML) INJECTION EVERY 12 HOURS SCHEDULED
Status: DISCONTINUED | OUTPATIENT
Start: 2019-07-09 | End: 2019-07-12 | Stop reason: HOSPADM

## 2019-07-09 RX ORDER — ATORVASTATIN CALCIUM 10 MG/1
10 TABLET, FILM COATED ORAL DAILY
Status: DISCONTINUED | OUTPATIENT
Start: 2019-07-09 | End: 2019-07-12 | Stop reason: HOSPADM

## 2019-07-09 RX ORDER — FUROSEMIDE 40 MG/1
40 TABLET ORAL DAILY
Status: DISCONTINUED | OUTPATIENT
Start: 2019-07-09 | End: 2019-07-10

## 2019-07-09 RX ORDER — SODIUM CHLORIDE 9 MG/ML
1000 INJECTION, SOLUTION INTRAVENOUS CONTINUOUS
Status: DISCONTINUED | OUTPATIENT
Start: 2019-07-09 | End: 2019-07-10

## 2019-07-09 RX ORDER — AMLODIPINE BESYLATE 5 MG/1
10 TABLET ORAL DAILY
Status: DISCONTINUED | OUTPATIENT
Start: 2019-07-09 | End: 2019-07-12 | Stop reason: HOSPADM

## 2019-07-09 RX ORDER — ONDANSETRON 2 MG/ML
4 INJECTION INTRAMUSCULAR; INTRAVENOUS EVERY 6 HOURS PRN
Status: DISCONTINUED | OUTPATIENT
Start: 2019-07-09 | End: 2019-07-12 | Stop reason: HOSPADM

## 2019-07-09 RX ORDER — CARVEDILOL 3.12 MG/1
3.12 TABLET ORAL 2 TIMES DAILY WITH MEALS
Status: DISCONTINUED | OUTPATIENT
Start: 2019-07-09 | End: 2019-07-12 | Stop reason: HOSPADM

## 2019-07-09 RX ORDER — ACETAMINOPHEN 325 MG/1
650 TABLET ORAL EVERY 8 HOURS PRN
COMMUNITY

## 2019-07-09 RX ORDER — DIVALPROEX SODIUM 125 MG/1
125 CAPSULE, COATED PELLETS ORAL 2 TIMES DAILY
Status: DISCONTINUED | OUTPATIENT
Start: 2019-07-09 | End: 2019-07-12 | Stop reason: HOSPADM

## 2019-07-09 RX ORDER — ARIPIPRAZOLE 10 MG/1
10 TABLET ORAL DAILY
Status: DISCONTINUED | OUTPATIENT
Start: 2019-07-09 | End: 2019-07-12 | Stop reason: HOSPADM

## 2019-07-09 RX ORDER — LORATADINE 10 MG/1
10 CAPSULE, LIQUID FILLED ORAL DAILY
COMMUNITY

## 2019-07-09 RX ORDER — ASPIRIN 81 MG/1
81 TABLET ORAL DAILY
Status: DISCONTINUED | OUTPATIENT
Start: 2019-07-09 | End: 2019-07-12 | Stop reason: HOSPADM

## 2019-07-09 RX ORDER — ARIPIPRAZOLE 10 MG/1
10 TABLET ORAL DAILY
COMMUNITY

## 2019-07-09 RX ADMIN — Medication 10 ML: at 23:10

## 2019-07-09 RX ADMIN — IOPAMIDOL 75 ML: 755 INJECTION, SOLUTION INTRAVENOUS at 16:22

## 2019-07-09 RX ADMIN — CARVEDILOL 3.12 MG: 3.12 TABLET, FILM COATED ORAL at 23:09

## 2019-07-09 RX ADMIN — DIVALPROEX SODIUM 125 MG: 125 CAPSULE, COATED PELLETS ORAL at 23:26

## 2019-07-09 RX ADMIN — SODIUM CHLORIDE 1000 ML: 9 INJECTION, SOLUTION INTRAVENOUS at 18:24

## 2019-07-09 RX ADMIN — ATORVASTATIN CALCIUM 10 MG: 10 TABLET, FILM COATED ORAL at 23:09

## 2019-07-09 RX ADMIN — SODIUM CHLORIDE 1000 ML: 9 INJECTION, SOLUTION INTRAVENOUS at 23:09

## 2019-07-09 RX ADMIN — APIXABAN 2.5 MG: 5 TABLET, FILM COATED ORAL at 23:07

## 2019-07-09 ASSESSMENT — PAIN SCALES - GENERAL: PAINLEVEL_OUTOF10: 0

## 2019-07-09 NOTE — ED PROVIDER NOTES
acute change  Delta waves, Brugada Syndrome, and Short [place] are not present. No significant change from prior EKG dated 3 barb 2019    RADIOLOGY  X-RAYS:  I have reviewed radiologic plain film image(s). ALL OTHER NON-PLAIN FILM IMAGES SUCH AS CT, ULTRASOUND AND MRI HAVE BEEN READ BY THE RADIOLOGIST. XR SHOULDER LEFT (MIN 2 VIEWS)   Preliminary Result   Left elbow: Suboptimal evaluation of the elbow, the joint space is not well   profiled. If clinical concern remains for injury, consider repeating x-ray   with three-view technique and improved alignment. Left shoulder: Degenerative changes are noted. No glenohumeral malalignment   is appreciated. Radiopaque material is seen beneath the left hemidiaphragm   which could reflect enteric contrast.  Correlation should be made for any   recent ingestion of enteric contrast.         XR ELBOW LEFT (2 VIEWS)   Preliminary Result   Left elbow: Suboptimal evaluation of the elbow, the joint space is not well   profiled. If clinical concern remains for injury, consider repeating x-ray   with three-view technique and improved alignment. Left shoulder: Degenerative changes are noted. No glenohumeral malalignment   is appreciated. Radiopaque material is seen beneath the left hemidiaphragm   which could reflect enteric contrast.  Correlation should be made for any   recent ingestion of enteric contrast.         CT CHEST WO CONTRAST   Final Result   Asymmetric marked enlargement of the left pectoralis minor muscle with   internal hyperdensity compatible with intramuscular hematoma. Correlate with   any history of trauma. That should be followed to resolution. Otherwise, no acute lung parenchymal abnormality. Progression of the compression fracture of T7, now with loss of approximately   80% of vertebral body height (previously 50%). XR CHEST PORTABLE   Final Result   Stable cardiomegaly.          CTA HEAD NECK W CONTRAST   Final Result   No large

## 2019-07-09 NOTE — H&P
Hospital Medicine History & Physical      PCP: Paul Holden    Date of Admission: 7/9/2019    Date of Service: Pt seen/examined on 07/09/19 and Admitted to Inpatient with expected LOS greater than two midnights due to medical therapy. .    Chief Complaint:  aphasia      History Of Present Illness:    80 y.o. female who presented to [facility] with acute aphasia. Patient was noted to be unable to speak while at her ECF today. Patient has a history of dementia and is a poor historian. Per report, patient was unable to communicate with staff today. She is blind at her baseline but normal speaks without trouble. In the ED, this quickly improved but patient has no memory of this episode. There was no other focal neurologic change noted during the episode. Patient did have bruising on her right shoulder and arm. Patient does endorse falling at her nursing home. No fractures were noted on imaging and no head trauma was noted. Past Medical History:          Diagnosis Date    CAD (coronary artery disease)     COPD [facility]chronic obstructive pulmonary disease) [facility] [address]     Depression     Diabetes mellitus ([facility] [address]     GERD (gastroesophageal reflux disease)     Hyperlipidemia     Hypertension     Mood disorder ([facility] [address]     Osteoarthritis     Osteoporosis     Spondylolysis        Past Surgical History:          Procedure Laterality Date    BLEPHAROPLASTY Bilateral     EYE SURGERY Bilateral     cataracts       Medications Prior to Admission:      Prior to Admission medications    Medication Sig Start Date End Date Taking?  Authorizing Provider   acetaminophen (TYLENOL) 325 MG tablet Take 650 mg by mouth every 8 hours as needed for Pain   Yes Historical Provider, MD   ARIPiprazole (ABILIFY) 10 MG tablet Take 10 mg by mouth daily   Yes Historical Provider, MD   atorvastatin (LIPITOR) 10 MG tablet Take 10 mg by mouth daily   Yes Historical Provider, MD   divalproex (DEPAKOTE) 125 MG DR tablet Take 125 mg by mouth 2 times daily   Yes Historical Provider, MD   loratadine (CLARITIN) 10 MG capsule Take 10 mg by mouth daily   Yes Historical Provider, MD   apixaban (ELIQUIS) 2.5 MG TABS tablet Take 1 tablet by mouth 2 times daily 9/1/93  Yes SOFIA Pina CNP   furosemide (LASIX) 40 MG tablet Take 1 tablet by mouth daily 1/6/31  Yes SOFIA Pina CNP   potassium chloride (KLOR-CON M) 10 MEQ extended release tablet Take 1 tablet by mouth daily 2/1/00  Yes SOFIA Pina CNP   ipratropium-albuterol (DUONEB) 0.5-2.5 (3) MG/3ML SOLN nebulizer solution Inhale 3 mLs into the lungs every 4 hours 1/12/19  Yes Demetrius Carcamo MD   carvedilol (COREG) 3.125 MG tablet Take 1 tablet by mouth 2 times daily (with meals) 1/12/19  Yes Demetrius Carcamo MD   amLODIPine (NORVASC) 10 MG tablet Take 1 tablet by mouth daily 1/13/19  Yes Demetrius Carcamo MD   aspirin 81 MG tablet Take 81 mg by mouth daily   Yes Historical Provider, MD   calcium citrate-vitamin D (CITRICAL + D) 315-250 MG-UNIT TABS per tablet Take 1 tablet by mouth daily (with breakfast)   Yes Historical Provider, MD   docusate sodium (COLACE) 100 MG capsule Take 100 mg by mouth daily   Yes Historical Provider, MD   ferrous sulfate 325 (65 Fe) MG tablet Take 325 mg by mouth daily (with breakfast)   Yes Historical Provider, MD   fluticasone (FLONASE) 50 MCG/ACT nasal spray 1 spray by Each Nare route daily as needed for Rhinitis   Yes Historical Provider, MD   ibuprofen (ADVIL;MOTRIN) 400 MG tablet Take 400 mg by mouth every 4 hours as needed for Pain   Yes Historical Provider, MD   lactulose (CHRONULAC) 10 GM/15ML solution Take 20 g by mouth daily   Yes Historical Provider, MD   melatonin 3 MG TABS tablet Take 3 mg by mouth daily Give 1 and 1/2 tablet   Yes Historical Provider, MD   polyethylene glycol (GLYCOLAX) powder Take 17 g by mouth daily as needed   Yes Historical Provider, MD   Multiple Vitamins-Minerals (PRESERVISION AREDS PO) Take by mouth daily   Yes

## 2019-07-09 NOTE — ED NOTES
initiated ED code stroke protocol @9601  -CT & lab contacted at this time  @2946  spoke to pt's RN at THE [facility]"   @0111 Tanner Landis spoke to [address] ( stroke)     Lucina Hector Naegele  07/09/19 [address] Naegele  07/09/19 2623

## 2019-07-10 ENCOUNTER — APPOINTMENT (OUTPATIENT)
Dept: MRI IMAGING | Age: 84
DRG: 683 | End: 2019-07-10
Payer: MEDICARE

## 2019-07-10 LAB
CHOLESTEROL, TOTAL: 114 MG/DL (ref 0–199)
HCT VFR BLD CALC: 31.7 % (ref 36–48)
HDLC SERPL-MCNC: 36 MG/DL (ref 40–60)
HEMOGLOBIN: 10.3 G/DL (ref 12–16)
LDL CHOLESTEROL CALCULATED: 52 MG/DL
MCH RBC QN AUTO: 32.7 PG (ref 26–34)
MCHC RBC AUTO-ENTMCNC: 32.5 G/DL (ref 31–36)
MCV RBC AUTO: 100.5 FL (ref 80–100)
PDW BLD-RTO: 14.6 % (ref 12.4–15.4)
PLATELET # BLD: 157 K/UL (ref 135–450)
PMV BLD AUTO: 9.9 FL (ref 5–10.5)
RBC # BLD: 3.15 M/UL (ref 4–5.2)
TRIGL SERPL-MCNC: 132 MG/DL (ref 0–150)
VLDLC SERPL CALC-MCNC: 26 MG/DL
WBC # BLD: 7 K/UL (ref 4–11)

## 2019-07-10 PROCEDURE — 85027 COMPLETE CBC AUTOMATED: CPT

## 2019-07-10 PROCEDURE — 80061 LIPID PANEL: CPT

## 2019-07-10 PROCEDURE — 6370000000 HC RX 637 (ALT 250 FOR IP): Performed by: INTERNAL MEDICINE

## 2019-07-10 PROCEDURE — 1200000000 HC SEMI PRIVATE

## 2019-07-10 PROCEDURE — 83036 HEMOGLOBIN GLYCOSYLATED A1C: CPT

## 2019-07-10 PROCEDURE — 36415 COLL VENOUS BLD VENIPUNCTURE: CPT

## 2019-07-10 PROCEDURE — 2580000003 HC RX 258: Performed by: REGISTERED NURSE

## 2019-07-10 PROCEDURE — 70551 MRI BRAIN STEM W/O DYE: CPT

## 2019-07-10 RX ORDER — SODIUM CHLORIDE 9 MG/ML
1000 INJECTION, SOLUTION INTRAVENOUS CONTINUOUS
Status: DISCONTINUED | OUTPATIENT
Start: 2019-07-10 | End: 2019-07-11

## 2019-07-10 RX ADMIN — APIXABAN 2.5 MG: 5 TABLET, FILM COATED ORAL at 20:47

## 2019-07-10 RX ADMIN — CARVEDILOL 3.12 MG: 3.12 TABLET, FILM COATED ORAL at 17:51

## 2019-07-10 RX ADMIN — DIVALPROEX SODIUM 125 MG: 125 CAPSULE, COATED PELLETS ORAL at 20:47

## 2019-07-10 RX ADMIN — SODIUM CHLORIDE 1000 ML: 9 INJECTION, SOLUTION INTRAVENOUS at 09:44

## 2019-07-10 ASSESSMENT — PAIN SCALES - GENERAL
PAINLEVEL_OUTOF10: 0

## 2019-07-10 NOTE — PROGRESS NOTES
Physical Therapy  Orders received, chart reviewed, OT/PT evaluations attempted, pt agitated and adamantly refusing therapy or to get out of bed at all. On arrival, pt's legs were out of bed and pt asked for [facility] to be lowered so she could put her legs up. Bed was lowered, pt put legs up in bed and closed her eyes and refused to speak to therapists. Will attempt evals again tomorrow.   Lee Guadarrama, PT  Stepan Hankins' OTR/L

## 2019-07-10 NOTE — PROGRESS NOTES
Pt removed jim catheter. Pt combative and confused. Will keep jim out and monitor for urinary output.

## 2019-07-11 LAB
ANION GAP SERPL CALCULATED.3IONS-SCNC: 13 MMOL/L (ref 3–16)
BUN BLDV-MCNC: 16 MG/DL (ref 7–20)
CALCIUM SERPL-MCNC: 8.9 MG/DL (ref 8.3–10.6)
CHLORIDE BLD-SCNC: 104 MMOL/L (ref 99–110)
CO2: 24 MMOL/L (ref 21–32)
CREAT SERPL-MCNC: 0.8 MG/DL (ref 0.6–1.2)
ESTIMATED AVERAGE GLUCOSE: 139.9 MG/DL
GFR AFRICAN AMERICAN: >60
GFR NON-AFRICAN AMERICAN: >60
GLUCOSE BLD-MCNC: 74 MG/DL (ref 70–99)
HBA1C MFR BLD: 6.5 %
POTASSIUM REFLEX MAGNESIUM: 3.6 MMOL/L (ref 3.5–5.1)
SODIUM BLD-SCNC: 141 MMOL/L (ref 136–145)
TROPONIN: <0.01 NG/ML

## 2019-07-11 PROCEDURE — 97530 THERAPEUTIC ACTIVITIES: CPT

## 2019-07-11 PROCEDURE — 80048 BASIC METABOLIC PNL TOTAL CA: CPT

## 2019-07-11 PROCEDURE — 97162 PT EVAL MOD COMPLEX 30 MIN: CPT

## 2019-07-11 PROCEDURE — 97116 GAIT TRAINING THERAPY: CPT

## 2019-07-11 PROCEDURE — 97166 OT EVAL MOD COMPLEX 45 MIN: CPT

## 2019-07-11 PROCEDURE — 97535 SELF CARE MNGMENT TRAINING: CPT

## 2019-07-11 PROCEDURE — 92610 EVALUATE SWALLOWING FUNCTION: CPT

## 2019-07-11 PROCEDURE — 84484 ASSAY OF TROPONIN QUANT: CPT

## 2019-07-11 PROCEDURE — 2580000003 HC RX 258: Performed by: INTERNAL MEDICINE

## 2019-07-11 PROCEDURE — 1200000000 HC SEMI PRIVATE

## 2019-07-11 PROCEDURE — 36415 COLL VENOUS BLD VENIPUNCTURE: CPT

## 2019-07-11 PROCEDURE — 6370000000 HC RX 637 (ALT 250 FOR IP): Performed by: INTERNAL MEDICINE

## 2019-07-11 RX ADMIN — AMLODIPINE BESYLATE 10 MG: 5 TABLET ORAL at 10:09

## 2019-07-11 RX ADMIN — ATORVASTATIN CALCIUM 10 MG: 10 TABLET, FILM COATED ORAL at 10:09

## 2019-07-11 RX ADMIN — DESVENLAFAXINE SUCCINATE 100 MG: 50 TABLET, FILM COATED, EXTENDED RELEASE ORAL at 10:46

## 2019-07-11 RX ADMIN — ASPIRIN 81 MG: 81 TABLET ORAL at 10:09

## 2019-07-11 RX ADMIN — ARIPIPRAZOLE 10 MG: 10 TABLET ORAL at 10:09

## 2019-07-11 RX ADMIN — DIVALPROEX SODIUM 125 MG: 125 CAPSULE, COATED PELLETS ORAL at 10:09

## 2019-07-11 RX ADMIN — CARVEDILOL 3.12 MG: 3.12 TABLET, FILM COATED ORAL at 10:21

## 2019-07-11 RX ADMIN — Medication 10 ML: at 22:27

## 2019-07-11 RX ADMIN — CARVEDILOL 3.12 MG: 3.12 TABLET, FILM COATED ORAL at 18:05

## 2019-07-11 RX ADMIN — APIXABAN 2.5 MG: 5 TABLET, FILM COATED ORAL at 22:27

## 2019-07-11 RX ADMIN — Medication 10 ML: at 10:10

## 2019-07-11 RX ADMIN — DIVALPROEX SODIUM 125 MG: 125 CAPSULE, COATED PELLETS ORAL at 22:27

## 2019-07-11 RX ADMIN — APIXABAN 2.5 MG: 5 TABLET, FILM COATED ORAL at 10:10

## 2019-07-11 ASSESSMENT — PAIN SCALES - GENERAL
PAINLEVEL_OUTOF10: 0

## 2019-07-11 NOTE — PROGRESS NOTES
telemetry  Vision/Hearing  Vision: Impaired  Vision Exceptions: Legally blind  Hearing: Exceptions to [facility]  Hearing Exceptions: Bilateral hearing aid     Subjective  General  Chart Reviewed: Yes  Patient assessed for rehabilitation services?: Yes  Response To Previous Treatment: Patient with no complaints from previous session. Family / Caregiver Present: No  Referring Practitioner: Katie spear MD  Referral Date : 07/09/19  Diagnosis: ARIELA  Follows Commands: Impaired  Other (Comment): delayed processing, very [place] and blind  General Comment  Comments: Pt resting in bed upon entry, RN cleared pt for therapy  Subjective  Subjective: Pt agreeable to PT  Pain Screening  Patient Currently in Pain: Denies  Vital Signs  Patient Currently in Pain: Denies  Pre Treatment Pain Screening  Intervention List: Patient able to continue with treatment    Orientation  Orientation  Overall Orientation Status: Impaired  Orientation Level: Oriented to place; Disoriented to situation;Disoriented to time;Oriented to person  Social/Functional History  Social/Functional History  Lives With: Alone  Type of Home: Facility([facility])  Home Layout: One level  Home Access: Level entry  Bathroom Shower/Tub: Walk-in shower  Bathroom Toilet: Handicap height  Bathroom Equipment: Grab bars in shower, Grab bars around toilet  Home Equipment: [employer]  ADL Assistance: Needs assistance(Pt reports she is having increasing difficulty)  Dressing: Minimal assistance  Homemaking Responsibilities: No  Ambulation Assistance: Independent  Transfer Assistance: Independent  Active : No  Additional Comments: Pt poor historian, unsure of baseline functioning         Objective          AROM RLE (degrees)  RLE AROM: WFL  AROM LLE (degrees)  LLE AROM : WFL  Strength RLE  Strength RLE: WFL  Strength LLE  Strength LLE: WFL           Transfers  Sit to Stand: Minimal Assistance  Stand to sit: Minimal Assistance  Bed to Chair: Minimal assistance(with RW)  Ambulation  Ambulation?: Yes  Ambulation 1  Surface: level tile  Device: Rolling Walker  Assistance: Minimal assistance  Quality of Gait: moderately unsteady, no overt LOB  Gait Deviations: Slow Kandi;Decreased step length;Decreased step height;Shuffles  Distance: 12 ft bed to toilet  Comments: Pt with c/o dizziness while on toilet, used Kenisha Magy from toilet to chair with Bushra x2 for safety     Balance  Posture: Fair  Sitting - Static: Fair;+  Sitting - Dynamic: Fair;+  Standing - Static: Fair  Standing - Dynamic: [address]  Times per week: 3-5  Current Treatment Recommendations: Strengthening, Gait Training, ROM, Balance Training, Endurance Training, Functional Mobility Training, Transfer Training  Safety Devices  Type of devices: All fall risk precautions in place, Left in chair, Call light within reach, Chair alarm in place, Telesitter in use, Nurse notified, Gait belt, Patient at risk for falls      AM-PAC Score     AM-PAC Inpatient Mobility without Stair Climbing Raw Score : 15 (07/11/19 1257)  AM-PAC Inpatient without Stair Climbing T-Scale Score : 43.03 (07/11/19 1257)  Mobility Inpatient CMS 0-100% Score: 47.43 (07/11/19 1257)  Mobility Inpatient without Stair CMS G-Code Modifier : CK (07/11/19 1257)       Goals  Short term goals  Time Frame for Short term goals: 7/15/19 unless specified  Short term goal 1: Pt will perform bed mobility with SBA by 7/14/19  Short term goal 2: Pt will perform transfers with CGA  Short term goal 3: Pt will ambulate 50 ft with RW and CGA  Patient Goals   Patient goals : \"to lie back down\"       Therapy Time   Individual Concurrent Group Co-treatment   Time In 0841         Time Out 0917         Minutes 36         Timed Code Treatment Minutes: 26 Minutes    If pt is discharged prior to next therapy session, this note will serve as discharge summary.     Julianne Jacnito, PT

## 2019-07-11 NOTE — PROGRESS NOTES
Nutrition Assessment    Type and Reason for Visit: Reassess    Nutrition Recommendations:   1. Continue Dental Soft Diet per SLP  2. Start Ensure BID to trial  3. Will continue to monitor pt nutritional status, intake, weights, bowel habits, and need for further nutritional intervention  4. Will conduct nutrition focused physical exam pending patient availability/alertness     Nutrition Assessment: Follow-up: unable to see patient r/t pt sleeping. RN notes that d/t patient's confusion she has been sleeping during the day. Follow-up conducted based on nursing interview. Per RN patient has a good appetite and is hungry. Patient's diet has been advanced to dental soft per SLP. Patient remains at nutrition related risk AEB 50%> PO intake x1 meal since diet advancement. RN believes that patient is favorable to ONS, will trial Ensure. Will continue to monitor nutrition status and need for additional nutrition support. Malnutrition Assessment:  · Malnutrition Status:  At risk for malnutrition(Needs full NFPE)    Nutrition Risk Level: High    Nutrient Needs:  · Estimated Daily Total Kcal: 8264-1479(22-53 kcal/kg)  · Estimated Daily Protein (g): 61-73(1.0-1.2 g/kg)  · Estimated Daily Total Fluid (ml/day): 1 ml/kcal    Nutrition Diagnosis:   · Problem: Inadequate oral intake  · Etiology: related to Insufficient energy/nutrient consumption     Signs and symptoms:  as evidenced by NPO status due to medical condition    Objective Information:  · Nutrition-Focused Physical Findings: Confused and combative at times  · Wound Type: None  · Current Nutrition Therapies:  · Oral Diet Orders: Dental Soft   · Oral Diet intake: 26-50%  · Oral Nutrition Supplement (ONS) Orders: None  · ONS intake: Unable to assess  · Anthropometric Measures:  · Ht: 5' 4\" (162.6 cm)   · Current Body Wt: 134 lb 7.7 oz (61 kg)(Actual;Bed scale)  · Admission Body Wt: 134 lb 7.7 oz (61 kg)(Actual;Bed scale)  · % Weight Change:  Appears stable per

## 2019-07-11 NOTE — DISCHARGE INSTR - COC
Continuity of Care Form    Patient Name: Cande Henry   :  1930  MRN:  [id number]    Admit date:  2019  Discharge date:  19    Code Status Order: Full Code   Advance Directives:   [facility] Documentation     Date/Time Healthcare Directive Type of Healthcare Directive Copy in [address] Agent's Name Healthcare Agent's Phone Number    19  Yes, patient has an advance directive for healthcare treatment  Living will;Durable power of  for health care  --  --  --  --          Admitting Physician:  Jenna Rboertson MD  PCP: Юлия Ryan    Discharging Nurse: [facility] Unit/Room#: 8956/4364-74  Discharging Unit Phone Number: [phone]    Emergency Contact:   Extended Emergency Contact Information  Primary Emergency Contact: Dain [address] of [address] [place] [address] Phone: [phone]  Mobile Phone: [phone]  Relation: Child    Past Surgical History:  Past Surgical History:   Procedure Laterality Date    BLEPHAROPLASTY Bilateral     EYE SURGERY Bilateral     cataracts       Immunization History: There is no immunization history on file for this patient.     Active Problems:  Patient Active Problem List   Diagnosis Code    Pneumonia J18.9    Severe sepsis ([facility] [address] A41.9, R65.20    Depression F32.9    DMII (diabetes mellitus, type 2) ([facility] [address] E11.9    Acute respiratory failure with hypoxia ([facility] [address] J96.01    CHF (congestive heart failure), NYHA class I, acute on chronic, combined ([facility]) I50.43    Acute diastolic CHF (congestive heart failure) ([facility]) I50.31    A-fib ([facility]) I48.91    Dementia F03.90    Acute kidney injury ([facility] [address] N17.9    Aphasia R47.01       Isolation/Infection:   Isolation          No Isolation            Nurse Assessment:  Last Vital Signs: BP (!) 146/83   Pulse 83   Temp 97.6 °F (36.4 °C) (Axillary)   Resp 16   Ht 5' 4\" (1.626 m)   Wt 134 lb 7.7 oz (61 kg)   SpO2 97%   BMI 23.08 kg/m²     Last documented pain score (0-10 scale): Pain Level: 0  Last Weight:   Wt Readings from Last 1 Encounters:   07/09/19 134 lb 7.7 oz (61 kg)     Mental Status:  disoriented, alert and able to concentrate and follow conversation    IV Access:  - None    Nursing Mobility/ADLs:  Walking   Assisted  Transfer  Assisted  Bathing  Assisted  Dressing  Assisted  Toileting  Assisted  Feeding  Assisted  Med Admin  Assisted  Med Delivery   whole in puree (applesauce)    Wound Care Documentation and Therapy:        Elimination:  Continence:   · Bowel: Yes  · Bladder: Yes  Urinary Catheter: Removal Date 7/9   Colostomy/Ileostomy/Ileal Conduit: No       Date of Last BM: 7/9/19    Intake/Output Summary (Last 24 hours) at 7/11/2019 1129  Last data filed at 7/11/2019 0940  Gross per 24 hour   Intake 761 ml   Output 0 ml   Net 761 ml     I/O last 3 completed shifts: In: 761 [I.V.:761]  Out: 0     Safety Concerns: At Risk for Falls    Impairments/Disabilities:      Vision and Hearing    Nutrition Therapy:  Current Nutrition Therapy:   - Oral Diet:  Dental Soft    Routes of Feeding: Oral  Liquids: Thin Liquids  Daily Fluid Restriction: no  Last Modified Barium Swallow with Video (Video Swallowing Test): not done    Treatments at the Time of Hospital Discharge:   Respiratory Treatments: n/a  Oxygen Therapy:  is not on home oxygen therapy.   Ventilator:    - No ventilator support    Rehab Therapies: Physical Therapy and Occupational Therapy  Weight Bearing Status/Restrictions: No weight bearing restirctions  Other Medical Equipment (for information only, NOT a DME order):  walker  Other Treatments: n/a    Patient's personal belongings (please select all that are sent with patient):  Hearing Aides bilateral, Dentures upper and lower    RN SIGNATURE:  Electronically signed by Evone Pump on 7/12/19 at 3:11 PM    CASE MANAGEMENT/SOCIAL WORK SECTION    Inpatient Status Date:  7/9/19    Readmission Risk Assessment

## 2019-07-11 NOTE — PROGRESS NOTES
Speech Language Pathology  Facility/Department: [place] [address] - MED SURG   CLINICAL BEDSIDE SWALLOW EVALUATION    NAME: Suzi Singh  : 1930  MRN: [id number]    ADMISSION DATE: 2019  ADMITTING DIAGNOSIS: has Pneumonia; Severe sepsis ([facility] [address] Depression; DMII (diabetes mellitus, type 2) ([facility] [address] Acute respiratory failure with hypoxia (HCC); CHF (congestive heart failure), NYHA class I, acute on chronic, combined ([facility] [address] Acute diastolic CHF (congestive heart failure) ([facility] [address] A-fib ([facility] [address] Dementia; Acute kidney injury ([facility] and Aphasia on their problem list.  ONSET DATE: Pt admitted to [facility] on 19    Recent Chest Xray/CT of Chest    Date of Eval: 2019  Evaluating Therapist: Yuri Hayes    Current Diet level:  Current Diet : NPO(Pending BSE)  Current Liquid Diet : NPO(Pending BSE)    Primary Complaint  Patient Complaint: n/a    Pain:  Pain Assessment: Pt denied    Reason for Referral  Suzi Singh was referred for a bedside swallow evaluation to assess the efficiency of her swallow function, identify signs and symptoms of aspiration and make recommendations regarding safe dietary consistencies, effective compensatory strategies, and safe eating environment. Impression  Dysphagia Diagnosis: Mild oral stage dysphagia;Mild pharyngeal stage dysphagia  Dysphagia Outcome Severity Scale: Level 5: Mild dysphagia- Distant supervision. May need one diet consistency restricted   Pt seen upright in chair, alert and agreeable to evaluation, but pleasantly confused. Pt is legally blind, required total feed assistance with all PO.  RN OK'd SLP entry and evaluation. Pt had just finished working with PT/OT upon SLP entry. Pt observed with ice chips and thin liquid trials via cup and straw with grossly timely swallow initiation, no overt s/s of aspiration/penetration --no coughing, throat clearing, or wet vocal quality. Pt's RR 20-24/min before, during, and after PO trials.   Unable to gather adequate O2

## 2019-07-12 VITALS
BODY MASS INDEX: 22.96 KG/M2 | OXYGEN SATURATION: 93 % | TEMPERATURE: 97.9 F | SYSTOLIC BLOOD PRESSURE: 98 MMHG | WEIGHT: 134.48 LBS | HEIGHT: 64 IN | DIASTOLIC BLOOD PRESSURE: 61 MMHG | RESPIRATION RATE: 16 BRPM | HEART RATE: 79 BPM

## 2019-07-12 LAB
EKG ATRIAL RATE: 101 BPM
EKG DIAGNOSIS: NORMAL
EKG Q-T INTERVAL: 328 MS
EKG QRS DURATION: 76 MS
EKG QTC CALCULATION (BAZETT): 439 MS
EKG R AXIS: 19 DEGREES
EKG T AXIS: 59 DEGREES
EKG VENTRICULAR RATE: 108 BPM

## 2019-07-12 PROCEDURE — 93010 ELECTROCARDIOGRAM REPORT: CPT | Performed by: INTERNAL MEDICINE

## 2019-07-12 PROCEDURE — 97116 GAIT TRAINING THERAPY: CPT

## 2019-07-12 PROCEDURE — 2580000003 HC RX 258: Performed by: INTERNAL MEDICINE

## 2019-07-12 PROCEDURE — 6370000000 HC RX 637 (ALT 250 FOR IP): Performed by: INTERNAL MEDICINE

## 2019-07-12 PROCEDURE — 97530 THERAPEUTIC ACTIVITIES: CPT

## 2019-07-12 PROCEDURE — 97110 THERAPEUTIC EXERCISES: CPT

## 2019-07-12 RX ADMIN — ATORVASTATIN CALCIUM 10 MG: 10 TABLET, FILM COATED ORAL at 08:59

## 2019-07-12 RX ADMIN — Medication 10 ML: at 08:59

## 2019-07-12 RX ADMIN — CARVEDILOL 3.12 MG: 3.12 TABLET, FILM COATED ORAL at 08:58

## 2019-07-12 RX ADMIN — ASPIRIN 81 MG: 81 TABLET ORAL at 08:58

## 2019-07-12 RX ADMIN — APIXABAN 2.5 MG: 5 TABLET, FILM COATED ORAL at 08:58

## 2019-07-12 RX ADMIN — ARIPIPRAZOLE 10 MG: 10 TABLET ORAL at 08:57

## 2019-07-12 RX ADMIN — DIVALPROEX SODIUM 125 MG: 125 CAPSULE, COATED PELLETS ORAL at 08:58

## 2019-07-12 NOTE — CARE COORDINATION
DISCHARGE ORDER  Date/Time 2019 2:34 PM  Completed by: Gertrude James, Case Management    Patient Name: Suzi Singh    : 1930  Admitting Diagnosis: Acute kidney injury ([facility] [address] [N17.9]  Acute kidney injury ([facility] [address] [N17.9]  Admit Date/Time: 2019  3:56 PM    Noted discharge order. Confirmed discharge plan with patient / family (son Cindy Hendricks): Yes   Discharge Plan:  Received discharge order. Spoke with patient and called her son. They are agreeable to discharge. Patient will return to the [facility] but she will go to the rehab unit. Discharge orders and Continuity of Care faxed to facility: Yes  Hospital Exemption Notification System complete: Yes  Transportation arranged: Yes - [address] @ 15:30. Patient / Family (son) aware of  time: Yes   Nursing aware of  time: Yes  Receiving facility aware of  time: Yes  Pre-cert obtained? N/a    Spoke with Chloe De La Paz at the [facility]. She is aware of discharge and time of transport. Faxed AVS/JOHN to [phone].

## 2019-07-12 NOTE — PROGRESS NOTES
motion. No jugular venous distention. Trachea midline. Respiratory:  Normal respiratory effort. Clear to auscultation, bilaterally without Rales/Wheezes/Rhonchi. Cardiovascular: Regular rate and rhythm with normal S1/S2 without murmurs, rubs or gallops. Abdomen: Soft, non-tender, non-distended with normal bowel sounds. Musculoskeletal: No clubbing, cyanosis or edema bilaterally. Full range of motion without deformity. Skin: Skin color, texture, turgor normal.  No rashes or lesions. Neurologic:  Neurovascularly intact without any focal sensory/motor deficits. Cranial nerves: II-XII intact, grossly non-focal.  Psychiatric: Alert and oriented, thought content appropriate, normal insight  Capillary Refill: Brisk,< 3 seconds   Peripheral Pulses: +2 palpable, equal bilaterally       Labs:   Recent Labs     07/09/19  1601 07/10/19  0442   WBC 11.5* 7.0   HGB 12.3 10.3*   HCT 36.8 31.7*    157     Recent Labs     07/09/19  1636 07/11/19  0633    141   K 4.4 3.6   CL 95* 104   CO2 29 24   BUN 28* 16   CREATININE 1.6* 0.8   CALCIUM 9.0 8.9     Recent Labs     07/09/19  1636   AST 13*   ALT 8*   BILITOT 0.6   ALKPHOS 58     Recent Labs     07/09/19  1636   INR 1.30*     Recent Labs     07/09/19  1636 07/11/19  0633   TROPONINI 0.02* <0.01       Urinalysis:      Lab Results   Component Value Date    NITRU Negative 07/09/2019    WBCUA 3-5 06/07/2019    BACTERIA 4+ 11/30/2018    RBCUA 3-5 06/07/2019    BLOODU Negative 07/09/2019    SPECGRAV 1.010 07/09/2019    GLUCOSEU Negative 07/09/2019       Radiology:  MRI brain without contrast   Final Result   Exam limited by motion artifact, without evidence of acute intracranial   abnormality. There is extensive bilateral cerebral white matter disease. XR SHOULDER LEFT (MIN 2 VIEWS)   Final Result   Left elbow: Suboptimal evaluation of the elbow, the joint space is not well   profiled.   If clinical concern remains for injury, consider repeating x-ray with three-view technique and improved alignment. Left shoulder: Degenerative changes are noted. No glenohumeral malalignment   is appreciated. Radiopaque material is seen beneath the left hemidiaphragm   which could reflect enteric contrast.  Correlation should be made for any   recent ingestion of enteric contrast.         XR ELBOW LEFT (2 VIEWS)   Final Result   Left elbow: Suboptimal evaluation of the elbow, the joint space is not well   profiled. If clinical concern remains for injury, consider repeating x-ray   with three-view technique and improved alignment. Left shoulder: Degenerative changes are noted. No glenohumeral malalignment   is appreciated. Radiopaque material is seen beneath the left hemidiaphragm   which could reflect enteric contrast.  Correlation should be made for any   recent ingestion of enteric contrast.         CT CHEST WO CONTRAST   Final Result   Asymmetric marked enlargement of the left pectoralis minor muscle with   internal hyperdensity compatible with intramuscular hematoma. Correlate with   any history of trauma. That should be followed to resolution. Otherwise, no acute lung parenchymal abnormality. Progression of the compression fracture of T7, now with loss of approximately   80% of vertebral body height (previously 50%). XR CHEST PORTABLE   Final Result   Stable cardiomegaly. CTA HEAD NECK W CONTRAST   Final Result   No large vessel occlusion detected within the head or neck. 50% stenosis of the left subclavian artery, the proximal left ICA, and the   mid basilar artery. Moderate tandem stenoses of both distal PCAs. Possible microaneurysm of the left PCA. Incidental findings as detailed above including a healing fracture of the   left clavicular head and large soft tissue mass along the left anterior chest   wall, worrisome for a hematoma. Findings were discussed with Diedra Lesch at 5:01 pm on 7/9/2019. RECOMMENDATIONS:   Noncontrast CT of the chest for further evaluation. CT HEAD WO CONTRAST   Final Result   No acute intracranial abnormality. Moderate low-density white matter compatible with chronic small vessel   disease. Findings were discussed with Sunil Virgen at 4:20 pm on 7/9/2019. Assessment/Plan:    Active Hospital Problems    Diagnosis    Acute kidney injury ([facility] [address] [facility] [N17.9]    Aphasia [R47.01]    A-fib ([facility] [address] [facility] [I48.91]    Dementia [F03.90]    CHF (congestive heart failure), NYHA class I, acute on chronic, combined (HCC) [I50.43]    DMII (diabetes mellitus, type 2) ([facility] [address] [E11.9]       Acute kidney injury (resolved):  - Likely prerenal from mild dehydration. Resolved with IV fluids.      Aphasia (resolved):  - Unclear etiology. Possibly TIA vs acute encephalopathy. - Currently appears improved. - CT head and CTA head and neck were negative. MRI brain without evidence of acute intracranial abnormality.     Dementia:  - Currently at neurologic baseline.  - Has been having recurrent falls at [facility]. - Xrays looking for acute fractures were negative. - Continue home depakote, pristiq, abilify.  - SLP recommend dental soft diet. PT/OT recommending SNF.      Chronic diastolic heart failure:  - Appeared mildly dehydrated on admission.  - Held lasix while inpt, can resume at discharge. - Last echo 6/19 with EF 55-60%.    Atrial fibrillation with rate control:  - Continue home eliquis, coreg. DVT Prophylaxis: Pt is on Eliquis   Diet: DIET DENTAL SOFT;  Dietary Nutrition Supplements: Standard High Calorie Oral Supplement  Code Status: Full Code    PT/OT Eval Status: Ordered with recs for SNF    Dispo - Pt is medically ready.      Daryle Fort, APRN - CNP

## 2019-07-12 NOTE — PROGRESS NOTES
surgical history that includes eye surgery (Bilateral) and Blepharoplasty (Bilateral). Restrictions  Restrictions/Precautions: Up as Tolerated, General Precautions, Fall Risk  Other position/activity restrictions: telemetry  Subjective   Chart Reviewed: Yes  Response To Previous Treatment: Patient with no complaints from previous session. Family / Caregiver Present: No  Referring Practitioner: Remi Sanon MD  Subjective: Pt agreeable to PT  Comments: Pt resting in bed upon entry, RN cleared pt for therapy  Pain Screening  Patient Currently in Pain: Denies  Orientation  Orientation Level: Disoriented X4(only able to give name after therapist tells her her name)     Objective   Bed mobility  Supine to Sit: Minimal assistance(HOB elevated, min assist for trunk and LEs)  Transfers  Sit to Stand: Minimal Assistance  Stand to sit: Minimal Assistance  Bed to Chair: Minimal assistance  Comment: Pt is legally blind, needs assist with hand placement, verbal and tactile cues  Ambulation  Surface: level tile  Device: Rolling Walker  Assistance: Moderate assistance  Quality of Gait: pt needed assist to navigate RW and assist for balance at gait belt.  She required verbal and tactile cues due to decreased vision  Distance: 12 ft x 1 and 25 ft x 1, early fatigue        Exercises  Heelslides: 10  x B assisted  Hip Abduction: 10 x B assisted  HIP IR/ER: 10 x B assisted  Ankle Pumps: 10 x B assisted       AM-PAC Score  AM-PAC Inpatient Mobility without Stair Climbing Raw Score : 15 (07/12/19 0852)  AM-PAC Inpatient without Stair Climbing T-Scale Score : 43.03 (07/12/19 0852)  Mobility Inpatient CMS 0-100% Score: 47.43 (07/12/19 1702)  Mobility Inpatient without Stair CMS G-Code Modifier : CK (07/12/19 0124)     Goals  Short term goals  Time Frame for Short term goals: 7/15/19 unless specified  Short term goal 1: Pt will perform bed mobility with SBA by 7/14/19  Short term goal 2: Pt will perform transfers with CGA  Short term goal 3:

## 2019-07-12 NOTE — PROGRESS NOTES
Pt will complete functional ambulation with LRAD and SBA. Short term goal 3: Pt will complete 1 grooming task with no verbal cues. Short term goal 4: Pt will complete 5-10 reps BUE exercises for increased endurance. (7/14/19)  Patient Goals   Patient goals : Pt did not state.         Therapy Time   Individual Concurrent Group Co-treatment   Time In [address] [place] [address] 160         Time Out 0849         Minutes [place] [address] [place] [address]

## 2019-09-11 ENCOUNTER — APPOINTMENT (OUTPATIENT)
Dept: CT IMAGING | Age: 84
DRG: 470 | End: 2019-09-11
Payer: MEDICARE

## 2019-09-11 ENCOUNTER — APPOINTMENT (OUTPATIENT)
Dept: GENERAL RADIOLOGY | Age: 84
DRG: 470 | End: 2019-09-11
Payer: MEDICARE

## 2019-09-11 ENCOUNTER — HOSPITAL ENCOUNTER (INPATIENT)
Age: 84
LOS: 5 days | Discharge: NURSING, REHAB OR LONG-TERM CARE | DRG: 470 | End: 2019-09-16
Attending: EMERGENCY MEDICINE | Admitting: INTERNAL MEDICINE
Payer: MEDICARE

## 2019-09-11 DIAGNOSIS — N17.9 AKI (ACUTE KIDNEY INJURY) (HCC): ICD-10-CM
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
## 2019-09-11 DIAGNOSIS — S72.011A CLOSED SUBCAPITAL FRACTURE OF RIGHT FEMUR, INITIAL ENCOUNTER (HCC): Primary | ICD-10-CM

## 2019-09-11 DIAGNOSIS — R77.8 ELEVATED TROPONIN: ICD-10-CM

## 2019-09-11 DIAGNOSIS — R09.02 HYPOXIA: ICD-10-CM

## 2019-09-11 DIAGNOSIS — D72.829 LEUKOCYTOSIS, UNSPECIFIED TYPE: ICD-10-CM

## 2019-09-11 PROBLEM — S72.001A CLOSED FRACTURE OF RIGHT HIP (HCC): Status: ACTIVE | Noted: 2019-09-11

## 2019-09-11 LAB
A/G RATIO: 1.2 (ref 1.1–2.2)
ABO/RH: NORMAL
ALBUMIN SERPL-MCNC: 3.9 G/DL (ref 3.4–5)
ALP BLD-CCNC: 72 U/L (ref 40–129)
ALT SERPL-CCNC: 11 U/L (ref 10–40)
ANION GAP SERPL CALCULATED.3IONS-SCNC: 15 MMOL/L (ref 3–16)
ANTIBODY SCREEN: NORMAL
APTT: 30.2 SEC (ref 26–36)
AST SERPL-CCNC: 14 U/L (ref 15–37)
BACTERIA: ABNORMAL /HPF
BASOPHILS ABSOLUTE: 0.1 K/UL (ref 0–0.2)
BASOPHILS RELATIVE PERCENT: 0.4 %
BILIRUB SERPL-MCNC: 0.4 MG/DL (ref 0–1)
BILIRUBIN URINE: NEGATIVE
BLOOD, URINE: NEGATIVE
BUN BLDV-MCNC: 39 MG/DL (ref 7–20)
CALCIUM SERPL-MCNC: 9.5 MG/DL (ref 8.3–10.6)
CHLORIDE BLD-SCNC: 96 MMOL/L (ref 99–110)
CLARITY: CLEAR
CO2: 28 MMOL/L (ref 21–32)
COLOR: YELLOW
CREAT SERPL-MCNC: 1.7 MG/DL (ref 0.6–1.2)
EKG ATRIAL RATE: 101 BPM
EKG DIAGNOSIS: NORMAL
EKG Q-T INTERVAL: 332 MS
EKG QRS DURATION: 70 MS
EKG QTC CALCULATION (BAZETT): 463 MS
EKG R AXIS: 0 DEGREES
EKG T AXIS: 94 DEGREES
EKG VENTRICULAR RATE: 117 BPM
EOSINOPHILS ABSOLUTE: 0.5 K/UL (ref 0–0.6)
EOSINOPHILS RELATIVE PERCENT: 3.7 %
EPITHELIAL CELLS, UA: ABNORMAL /HPF
GFR AFRICAN AMERICAN: 34
GFR NON-AFRICAN AMERICAN: 28
GLOBULIN: 3.2 G/DL
GLUCOSE BLD-MCNC: 126 MG/DL (ref 70–99)
GLUCOSE BLD-MCNC: 90 MG/DL (ref 70–99)
GLUCOSE URINE: NEGATIVE MG/DL
HCT VFR BLD CALC: 36.8 % (ref 36–48)
HEMOGLOBIN: 12 G/DL (ref 12–16)
INR BLD: 1.3 (ref 0.86–1.14)
KETONES, URINE: ABNORMAL MG/DL
LACTIC ACID: 1.1 MMOL/L (ref 0.4–2)
LEUKOCYTE ESTERASE, URINE: ABNORMAL
LYMPHOCYTES ABSOLUTE: 1.1 K/UL (ref 1–5.1)
LYMPHOCYTES RELATIVE PERCENT: 7.5 %
MCH RBC QN AUTO: 32.6 PG (ref 26–34)
MCHC RBC AUTO-ENTMCNC: 32.5 G/DL (ref 31–36)
MCV RBC AUTO: 100.3 FL (ref 80–100)
MICROSCOPIC EXAMINATION: YES
MONOCYTES ABSOLUTE: 0.9 K/UL (ref 0–1.3)
MONOCYTES RELATIVE PERCENT: 6.6 %
NEUTROPHILS ABSOLUTE: 11.6 K/UL (ref 1.7–7.7)
NEUTROPHILS RELATIVE PERCENT: 81.8 %
NITRITE, URINE: NEGATIVE
PDW BLD-RTO: 13.3 % (ref 12.4–15.4)
PERFORMED ON: NORMAL
PH UA: 6 (ref 5–8)
PLATELET # BLD: 225 K/UL (ref 135–450)
PMV BLD AUTO: 9.3 FL (ref 5–10.5)
POTASSIUM SERPL-SCNC: 4.2 MMOL/L (ref 3.5–5.1)
PROTEIN UA: NEGATIVE MG/DL
PROTHROMBIN TIME: 14.8 SEC (ref 9.8–13)
RBC # BLD: 3.67 M/UL (ref 4–5.2)
RBC UA: ABNORMAL /HPF (ref 0–2)
SODIUM BLD-SCNC: 139 MMOL/L (ref 136–145)
SPECIFIC GRAVITY UA: 1.02 (ref 1–1.03)
TOTAL PROTEIN: 7.1 G/DL (ref 6.4–8.2)
TROPONIN: 0.04 NG/ML
URINE TYPE: ABNORMAL
UROBILINOGEN, URINE: 0.2 E.U./DL
WBC # BLD: 14.1 K/UL (ref 4–11)
WBC UA: ABNORMAL /HPF (ref 0–5)

## 2019-09-11 PROCEDURE — 86900 BLOOD TYPING SEROLOGIC ABO: CPT

## 2019-09-11 PROCEDURE — 85610 PROTHROMBIN TIME: CPT

## 2019-09-11 PROCEDURE — 1200000000 HC SEMI PRIVATE

## 2019-09-11 PROCEDURE — 83605 ASSAY OF LACTIC ACID: CPT

## 2019-09-11 PROCEDURE — 85730 THROMBOPLASTIN TIME PARTIAL: CPT

## 2019-09-11 PROCEDURE — 86850 RBC ANTIBODY SCREEN: CPT

## 2019-09-11 PROCEDURE — 99285 EMERGENCY DEPT VISIT HI MDM: CPT

## 2019-09-11 PROCEDURE — 84484 ASSAY OF TROPONIN QUANT: CPT

## 2019-09-11 PROCEDURE — 96361 HYDRATE IV INFUSION ADD-ON: CPT

## 2019-09-11 PROCEDURE — 2580000003 HC RX 258: Performed by: NURSE PRACTITIONER

## 2019-09-11 PROCEDURE — 73060 X-RAY EXAM OF HUMERUS: CPT

## 2019-09-11 PROCEDURE — 73502 X-RAY EXAM HIP UNI 2-3 VIEWS: CPT

## 2019-09-11 PROCEDURE — 81001 URINALYSIS AUTO W/SCOPE: CPT

## 2019-09-11 PROCEDURE — 70450 CT HEAD/BRAIN W/O DYE: CPT

## 2019-09-11 PROCEDURE — 6360000002 HC RX W HCPCS: Performed by: NURSE PRACTITIONER

## 2019-09-11 PROCEDURE — 93005 ELECTROCARDIOGRAM TRACING: CPT | Performed by: EMERGENCY MEDICINE

## 2019-09-11 PROCEDURE — 80053 COMPREHEN METABOLIC PANEL: CPT

## 2019-09-11 PROCEDURE — 72125 CT NECK SPINE W/O DYE: CPT

## 2019-09-11 PROCEDURE — 86901 BLOOD TYPING SEROLOGIC RH(D): CPT

## 2019-09-11 PROCEDURE — 93010 ELECTROCARDIOGRAM REPORT: CPT | Performed by: INTERNAL MEDICINE

## 2019-09-11 PROCEDURE — 85025 COMPLETE CBC W/AUTO DIFF WBC: CPT

## 2019-09-11 PROCEDURE — 96374 THER/PROPH/DIAG INJ IV PUSH: CPT

## 2019-09-11 PROCEDURE — 71045 X-RAY EXAM CHEST 1 VIEW: CPT

## 2019-09-11 RX ORDER — AMLODIPINE BESYLATE 5 MG/1
10 TABLET ORAL DAILY
Status: DISCONTINUED | OUTPATIENT
Start: 2019-09-12 | End: 2019-09-13

## 2019-09-11 RX ORDER — SODIUM CHLORIDE 0.9 % (FLUSH) 0.9 %
10 SYRINGE (ML) INJECTION EVERY 12 HOURS SCHEDULED
Status: DISCONTINUED | OUTPATIENT
Start: 2019-09-11 | End: 2019-09-16

## 2019-09-11 RX ORDER — FERROUS SULFATE 325(65) MG
325 TABLET ORAL
Status: DISCONTINUED | OUTPATIENT
Start: 2019-09-12 | End: 2019-09-16 | Stop reason: HOSPADM

## 2019-09-11 RX ORDER — DIVALPROEX SODIUM 125 MG/1
125 TABLET, DELAYED RELEASE ORAL 2 TIMES DAILY
Status: DISCONTINUED | OUTPATIENT
Start: 2019-09-11 | End: 2019-09-12 | Stop reason: RX

## 2019-09-11 RX ORDER — SODIUM CHLORIDE 0.9 % (FLUSH) 0.9 %
10 SYRINGE (ML) INJECTION PRN
Status: DISCONTINUED | OUTPATIENT
Start: 2019-09-11 | End: 2019-09-16

## 2019-09-11 RX ORDER — DEXTROSE MONOHYDRATE 50 MG/ML
100 INJECTION, SOLUTION INTRAVENOUS PRN
Status: DISCONTINUED | OUTPATIENT
Start: 2019-09-11 | End: 2019-09-16 | Stop reason: HOSPADM

## 2019-09-11 RX ORDER — ASPIRIN 81 MG/1
81 TABLET, CHEWABLE ORAL DAILY
Status: DISCONTINUED | OUTPATIENT
Start: 2019-09-12 | End: 2019-09-16 | Stop reason: HOSPADM

## 2019-09-11 RX ORDER — SODIUM CHLORIDE 9 MG/ML
INJECTION, SOLUTION INTRAVENOUS CONTINUOUS
Status: DISCONTINUED | OUTPATIENT
Start: 2019-09-11 | End: 2019-09-13

## 2019-09-11 RX ORDER — CARVEDILOL 3.12 MG/1
3.12 TABLET ORAL 2 TIMES DAILY WITH MEALS
Status: DISCONTINUED | OUTPATIENT
Start: 2019-09-12 | End: 2019-09-16 | Stop reason: HOSPADM

## 2019-09-11 RX ORDER — HEPARIN SODIUM 5000 [USP'U]/ML
5000 INJECTION, SOLUTION INTRAVENOUS; SUBCUTANEOUS EVERY 8 HOURS SCHEDULED
Status: DISCONTINUED | OUTPATIENT
Start: 2019-09-11 | End: 2019-09-12 | Stop reason: ALTCHOICE

## 2019-09-11 RX ORDER — 0.9 % SODIUM CHLORIDE 0.9 %
1000 INTRAVENOUS SOLUTION INTRAVENOUS ONCE
Status: COMPLETED | OUTPATIENT
Start: 2019-09-11 | End: 2019-09-11

## 2019-09-11 RX ORDER — ARIPIPRAZOLE 10 MG/1
10 TABLET ORAL DAILY
Status: DISCONTINUED | OUTPATIENT
Start: 2019-09-12 | End: 2019-09-16 | Stop reason: HOSPADM

## 2019-09-11 RX ORDER — NICOTINE POLACRILEX 4 MG
15 LOZENGE BUCCAL PRN
Status: DISCONTINUED | OUTPATIENT
Start: 2019-09-11 | End: 2019-09-16 | Stop reason: HOSPADM

## 2019-09-11 RX ORDER — HYDROCODONE BITARTRATE AND ACETAMINOPHEN 5; 325 MG/1; MG/1
1 TABLET ORAL EVERY 6 HOURS PRN
Status: DISCONTINUED | OUTPATIENT
Start: 2019-09-11 | End: 2019-09-16

## 2019-09-11 RX ORDER — FUROSEMIDE 40 MG/1
40 TABLET ORAL DAILY
Status: DISCONTINUED | OUTPATIENT
Start: 2019-09-12 | End: 2019-09-13

## 2019-09-11 RX ORDER — ONDANSETRON 2 MG/ML
4 INJECTION INTRAMUSCULAR; INTRAVENOUS EVERY 6 HOURS PRN
Status: DISCONTINUED | OUTPATIENT
Start: 2019-09-11 | End: 2019-09-13

## 2019-09-11 RX ORDER — MORPHINE SULFATE 2 MG/ML
2 INJECTION, SOLUTION INTRAMUSCULAR; INTRAVENOUS ONCE
Status: COMPLETED | OUTPATIENT
Start: 2019-09-11 | End: 2019-09-11

## 2019-09-11 RX ORDER — DOCUSATE SODIUM 100 MG/1
100 CAPSULE, LIQUID FILLED ORAL 2 TIMES DAILY
Status: DISCONTINUED | OUTPATIENT
Start: 2019-09-11 | End: 2019-09-13

## 2019-09-11 RX ORDER — POTASSIUM CHLORIDE 750 MG/1
10 TABLET, EXTENDED RELEASE ORAL DAILY
Status: DISCONTINUED | OUTPATIENT
Start: 2019-09-12 | End: 2019-09-16 | Stop reason: HOSPADM

## 2019-09-11 RX ORDER — DEXTROSE MONOHYDRATE 25 G/50ML
12.5 INJECTION, SOLUTION INTRAVENOUS PRN
Status: DISCONTINUED | OUTPATIENT
Start: 2019-09-11 | End: 2019-09-16 | Stop reason: HOSPADM

## 2019-09-11 RX ORDER — ATORVASTATIN CALCIUM 10 MG/1
10 TABLET, FILM COATED ORAL DAILY
Status: DISCONTINUED | OUTPATIENT
Start: 2019-09-12 | End: 2019-09-16 | Stop reason: HOSPADM

## 2019-09-11 RX ADMIN — MORPHINE SULFATE 2 MG: 2 INJECTION, SOLUTION INTRAMUSCULAR; INTRAVENOUS at 17:18

## 2019-09-11 RX ADMIN — SODIUM CHLORIDE 1000 ML: 9 INJECTION, SOLUTION INTRAVENOUS at 17:19

## 2019-09-11 ASSESSMENT — ENCOUNTER SYMPTOMS
ABDOMINAL PAIN: 0
DIARRHEA: 0
EYES NEGATIVE: 1
ABDOMINAL DISTENTION: 0
ALLERGIC/IMMUNOLOGIC NEGATIVE: 1
BACK PAIN: 0
WHEEZING: 0
VOMITING: 0
NAUSEA: 0
COUGH: 0
SHORTNESS OF BREATH: 0

## 2019-09-11 ASSESSMENT — PAIN SCALES - GENERAL
PAINLEVEL_OUTOF10: 3
PAINLEVEL_OUTOF10: 8

## 2019-09-11 ASSESSMENT — PAIN DESCRIPTION - LOCATION: LOCATION: HIP

## 2019-09-11 NOTE — ED PROVIDER NOTES
FUROSEMIDE (LASIX) 40 MG TABLET    Take 1 tablet by mouth daily    IBUPROFEN (ADVIL;MOTRIN) 400 MG TABLET    Take 400 mg by mouth every 4 hours as needed for Pain    IPRATROPIUM-ALBUTEROL (DUONEB) 0.5-2.5 (3) MG/3ML SOLN NEBULIZER SOLUTION    Inhale 3 mLs into the lungs every 4 hours    LACTULOSE (CHRONULAC) 10 GM/15ML SOLUTION    Take 20 g by mouth daily    LORATADINE (CLARITIN) 10 MG CAPSULE    Take 10 mg by mouth daily    MELATONIN 3 MG TABS TABLET    Take 3 mg by mouth daily Give 1 and 1/2 tablet    MULTIPLE VITAMINS-MINERALS (PRESERVISION AREDS PO)    Take by mouth daily    NICOTINE POLACRILEX (NICORETTE) 2 MG LOZENGE    Take 2 mg by mouth as needed for Smoking cessation    POLYETHYLENE GLYCOL (GLYCOLAX) POWDER    Take 17 g by mouth daily as needed    POTASSIUM CHLORIDE (KLOR-CON M) 10 MEQ EXTENDED RELEASE TABLET    Take 1 tablet by mouth daily         ALLERGIES     Amoxicillin-pot clavulanate    FAMILYHISTORY       Family History   Problem Relation Age of Onset    Diabetes Mother     Elevated Lipids Father     Diabetes Son           SOCIAL HISTORY       Social History     Socioeconomic History    Marital status:       Spouse name: None    Number of children: None    Years of education: None    Highest education level: None   Occupational History    None   Social Needs    Financial resource strain: None    Food insecurity:     Worry: None     Inability: None    Transportation needs:     Medical: None     Non-medical: None   Tobacco Use    Smoking status: Former Smoker    Smokeless tobacco: Never Used   Substance and Sexual Activity    Alcohol use: No    Drug use: None    Sexual activity: None   Lifestyle    Physical activity:     Days per week: None     Minutes per session: None    Stress: None   Relationships    Social connections:     Talks on phone: None     Gets together: None     Attends Restorationism service: None     Active member of club or organization: None     Attends meetings of clubs or organizations: None     Relationship status: None    Intimate partner violence:     Fear of current or ex partner: None     Emotionally abused: None     Physically abused: None     Forced sexual activity: None   Other Topics Concern    None   Social History Narrative    None       SCREENINGS             PHYSICAL EXAM    (up to 7 for level 4, 8 or more for level 5)     ED Triage Vitals   BP Temp Temp src Pulse Resp SpO2 Height Weight   09/11/19 1551 09/11/19 1551 -- 09/11/19 1551 09/11/19 1551 09/11/19 1551 -- 09/11/19 1546   (!) 124/108 98.4 °F (36.9 °C)  104 14 90 %  140 lb (63.5 kg)       Physical Exam   Constitutional: She appears well-developed and well-nourished. No distress. HENT:   Head: Normocephalic and atraumatic. Mouth/Throat: No oropharyngeal exudate. Eyes: Pupils are equal, round, and reactive to light. Conjunctivae and EOM are normal.   Neck: Normal range of motion. Cardiovascular: Regular rhythm, normal heart sounds and intact distal pulses. Tachycardia present. Exam reveals no gallop and no friction rub. No murmur heard. Pulmonary/Chest: Effort normal and breath sounds normal. No stridor. No respiratory distress. She has no wheezes. She has no rales. She exhibits no tenderness. Abdominal: Soft. Bowel sounds are normal. She exhibits no distension. There is no tenderness. There is no guarding. Musculoskeletal: She exhibits tenderness. Right hip: She exhibits decreased range of motion, decreased strength, tenderness and bony tenderness. Right upper arm: She exhibits tenderness and bony tenderness. Lymphadenopathy:     She has no cervical adenopathy. Neurological: She is alert. She is disoriented. Skin: Skin is warm and dry. Capillary refill takes less than 2 seconds. She is not diaphoretic. Psychiatric: She has a normal mood and affect.  Her behavior is normal. Judgment and thought content normal.       DIAGNOSTIC RESULTS   LABS:    Labs Reviewed   CBC subcapital hip fracture. Xray humerus: negative  NS bolus given along with morphine l for pain  Diagnosis: subcapital right hip fracture, ARIELA, elevated troponin, fall, hypoxia  Consult placed with Dr. Marko Vega orthopedic surgeon and states to admit to hospitalist and they will see inpatient  Hospitalist: Jase Ott, NP accepts admission      FINAL IMPRESSION      1. Closed subcapital fracture of right femur, initial encounter ([facility] [address]    2. Fall, initial encounter    3. ARIELA (acute kidney injury) ([facility] [address]    4. Elevated troponin    5. Leukocytosis, unspecified type    6. Hypoxia          DISPOSITION/PLAN   DISPOSITION        PATIENT REFERREDTO:  No follow-up provider specified.     DISCHARGE MEDICATIONS:  New Prescriptions    No medications on file       DISCONTINUED MEDICATIONS:  Discontinued Medications    No medications on file              (Please note that portions ofthis note were completed with a voice recognition program.  Efforts were made to edit the dictations but occasionally words are mis-transcribed.)    SOFIA Yanez CNP (electronically signed)           SOFIA Yanez CNP  09/11/19 2030

## 2019-09-11 NOTE — DISCHARGE INSTR - COC
Delivery   crushed and prefers mixed with applesauce    Wound Care Documentation and Therapy:        Elimination:  Continence:   · Bowel: No  · Bladder: No  Urinary Catheter: None   Colostomy/Ileostomy/Ileal Conduit: No       Date of Last BM: ***  No intake or output data in the 24 hours ending 09/11/19 1902  No intake/output data recorded. Safety Concerns:     History of Falls (last 30 days) and At Risk for Falls    Impairments/Disabilities:      None    Nutrition Therapy:  Current Nutrition Therapy:   - Oral Diet:  Dysphagia 2 mechanically altered    Routes of Feeding: Oral  Liquids: Nectar Thick Liquids  Daily Fluid Restriction: no  Last Modified Barium Swallow with Video (Video Swallowing Test): not done    Treatments at the Time of Hospital Discharge:   Respiratory Treatments: ***  Oxygen Therapy:  is on oxygen at 1 L/min per nasal cannula.   Ventilator:    - No ventilator support    Rehab Therapies: Physical Therapy and Occupational Therapy  Weight Bearing Status/Restrictions: No weight bearing restirctions  Other Medical Equipment (for information only, NOT a DME order):  lift  Other Treatments: ***    Patient's personal belongings (please select all that are sent with patient):  Hearing Aides bilateral, Dentures    RN SIGNATURE:  Electronically signed by Sanjana Putnam RN on 9/16/19 at 12:00 PM    CASE MANAGEMENT/SOCIAL WORK SECTION    Inpatient Status Date: ***    Readmission Risk Assessment Score:  Readmission Risk              Risk of Unplanned Readmission:        0           Discharging to Facility/ Agency   · Name: [facility]   · Address:  · Phone: [phone]  · Fax: [phone]    Dialysis Facility (if applicable)   · Name:  · Address:  · Dialysis Schedule:  · Phone:  · Fax:    / signature: Electronically signed by KO Hammond, WANDYW on 9/16/19 at 11:39 AM    PHYSICIAN SECTION    Prognosis: Fair    Condition at Discharge: Stable    Rehab Potential (if transferring to Rehab): Fair    Recommended Labs or Other Treatments After Discharge: CBC, BMP in 5 days    Physician Certification: I certify the above information and transfer of Suzi Singh  is necessary for the continuing treatment of the diagnosis listed and that she requires [facility] for less 30 days.      Update Admission H&P: No change in H&P    PHYSICIAN SIGNATURE:  Electronically signed by Sulma Navarro MD on 9/16/19 at 11:15 AM

## 2019-09-12 LAB
ANION GAP SERPL CALCULATED.3IONS-SCNC: 15 MMOL/L (ref 3–16)
BASOPHILS ABSOLUTE: 0.1 K/UL (ref 0–0.2)
BASOPHILS RELATIVE PERCENT: 0.7 %
BUN BLDV-MCNC: 32 MG/DL (ref 7–20)
CALCIUM SERPL-MCNC: 8.6 MG/DL (ref 8.3–10.6)
CHLORIDE BLD-SCNC: 103 MMOL/L (ref 99–110)
CO2: 25 MMOL/L (ref 21–32)
CREAT SERPL-MCNC: 1.1 MG/DL (ref 0.6–1.2)
CREATININE URINE: 93.3 MG/DL (ref 28–259)
EOSINOPHILS ABSOLUTE: 0.4 K/UL (ref 0–0.6)
EOSINOPHILS RELATIVE PERCENT: 3.9 %
GFR AFRICAN AMERICAN: 57
GFR NON-AFRICAN AMERICAN: 47
GLUCOSE BLD-MCNC: 130 MG/DL (ref 70–99)
GLUCOSE BLD-MCNC: 67 MG/DL (ref 70–99)
GLUCOSE BLD-MCNC: 71 MG/DL (ref 70–99)
GLUCOSE BLD-MCNC: 71 MG/DL (ref 70–99)
GLUCOSE BLD-MCNC: 80 MG/DL (ref 70–99)
GLUCOSE BLD-MCNC: 90 MG/DL (ref 70–99)
HCT VFR BLD CALC: 37.2 % (ref 36–48)
HEMOGLOBIN: 12.3 G/DL (ref 12–16)
LYMPHOCYTES ABSOLUTE: 1.1 K/UL (ref 1–5.1)
LYMPHOCYTES RELATIVE PERCENT: 11.7 %
MCH RBC QN AUTO: 33.4 PG (ref 26–34)
MCHC RBC AUTO-ENTMCNC: 33 G/DL (ref 31–36)
MCV RBC AUTO: 101.4 FL (ref 80–100)
MONOCYTES ABSOLUTE: 0.8 K/UL (ref 0–1.3)
MONOCYTES RELATIVE PERCENT: 8.4 %
NEUTROPHILS ABSOLUTE: 7.4 K/UL (ref 1.7–7.7)
NEUTROPHILS RELATIVE PERCENT: 75.3 %
PDW BLD-RTO: 13.6 % (ref 12.4–15.4)
PERFORMED ON: ABNORMAL
PERFORMED ON: ABNORMAL
PERFORMED ON: NORMAL
PLATELET # BLD: 185 K/UL (ref 135–450)
PMV BLD AUTO: 9.5 FL (ref 5–10.5)
POTASSIUM REFLEX MAGNESIUM: 4.2 MMOL/L (ref 3.5–5.1)
RBC # BLD: 3.67 M/UL (ref 4–5.2)
SODIUM BLD-SCNC: 143 MMOL/L (ref 136–145)
SODIUM URINE: 49 MMOL/L
TROPONIN: 0.02 NG/ML
WBC # BLD: 9.8 K/UL (ref 4–11)

## 2019-09-12 PROCEDURE — 2700000000 HC OXYGEN THERAPY PER DAY

## 2019-09-12 PROCEDURE — 6370000000 HC RX 637 (ALT 250 FOR IP): Performed by: INTERNAL MEDICINE

## 2019-09-12 PROCEDURE — 36415 COLL VENOUS BLD VENIPUNCTURE: CPT

## 2019-09-12 PROCEDURE — 85025 COMPLETE CBC W/AUTO DIFF WBC: CPT

## 2019-09-12 PROCEDURE — 2580000003 HC RX 258: Performed by: NURSE PRACTITIONER

## 2019-09-12 PROCEDURE — 6370000000 HC RX 637 (ALT 250 FOR IP): Performed by: NURSE PRACTITIONER

## 2019-09-12 PROCEDURE — 1200000000 HC SEMI PRIVATE

## 2019-09-12 PROCEDURE — 84484 ASSAY OF TROPONIN QUANT: CPT

## 2019-09-12 PROCEDURE — 6360000002 HC RX W HCPCS: Performed by: NURSE PRACTITIONER

## 2019-09-12 PROCEDURE — 94761 N-INVAS EAR/PLS OXIMETRY MLT: CPT

## 2019-09-12 PROCEDURE — 84300 ASSAY OF URINE SODIUM: CPT

## 2019-09-12 PROCEDURE — 82570 ASSAY OF URINE CREATININE: CPT

## 2019-09-12 PROCEDURE — 80048 BASIC METABOLIC PNL TOTAL CA: CPT

## 2019-09-12 RX ORDER — DIVALPROEX SODIUM 125 MG/1
125 CAPSULE, COATED PELLETS ORAL EVERY 12 HOURS SCHEDULED
Status: DISCONTINUED | OUTPATIENT
Start: 2019-09-12 | End: 2019-09-16 | Stop reason: HOSPADM

## 2019-09-12 RX ADMIN — ASPIRIN 81 MG 81 MG: 81 TABLET ORAL at 12:03

## 2019-09-12 RX ADMIN — DOCUSATE SODIUM 100 MG: 100 CAPSULE, LIQUID FILLED ORAL at 12:02

## 2019-09-12 RX ADMIN — Medication 3 MG: at 00:30

## 2019-09-12 RX ADMIN — HYDROCODONE BITARTRATE AND ACETAMINOPHEN 1 TABLET: 5; 325 TABLET ORAL at 00:30

## 2019-09-12 RX ADMIN — SODIUM CHLORIDE: 9 INJECTION, SOLUTION INTRAVENOUS at 10:52

## 2019-09-12 RX ADMIN — POTASSIUM CHLORIDE 10 MEQ: 750 TABLET, EXTENDED RELEASE ORAL at 12:05

## 2019-09-12 RX ADMIN — AMLODIPINE BESYLATE 10 MG: 5 TABLET ORAL at 12:02

## 2019-09-12 RX ADMIN — ARIPIPRAZOLE 10 MG: 10 TABLET ORAL at 12:05

## 2019-09-12 RX ADMIN — DIVALPROEX SODIUM 125 MG: 125 CAPSULE, COATED PELLETS ORAL at 12:02

## 2019-09-12 RX ADMIN — SODIUM CHLORIDE: 9 INJECTION, SOLUTION INTRAVENOUS at 00:26

## 2019-09-12 RX ADMIN — HYDROCODONE BITARTRATE AND ACETAMINOPHEN 1 TABLET: 5; 325 TABLET ORAL at 08:57

## 2019-09-12 RX ADMIN — CARVEDILOL 3.12 MG: 3.12 TABLET, FILM COATED ORAL at 12:03

## 2019-09-12 RX ADMIN — ATORVASTATIN CALCIUM 10 MG: 10 TABLET, FILM COATED ORAL at 12:02

## 2019-09-12 RX ADMIN — FERROUS SULFATE TAB 325 MG (65 MG ELEMENTAL FE) 325 MG: 325 (65 FE) TAB at 12:05

## 2019-09-12 RX ADMIN — CEFTRIAXONE SODIUM 1 G: 1 INJECTION, POWDER, FOR SOLUTION INTRAMUSCULAR; INTRAVENOUS at 00:26

## 2019-09-12 ASSESSMENT — PAIN DESCRIPTION - LOCATION: LOCATION: HIP

## 2019-09-12 ASSESSMENT — PAIN SCALES - GENERAL
PAINLEVEL_OUTOF10: 5
PAINLEVEL_OUTOF10: 0

## 2019-09-12 ASSESSMENT — PAIN DESCRIPTION - ORIENTATION: ORIENTATION: RIGHT

## 2019-09-12 ASSESSMENT — PAIN DESCRIPTION - PAIN TYPE: TYPE: ACUTE PAIN

## 2019-09-12 NOTE — H&P
Date End Date Taking?  Authorizing Provider   acetaminophen (TYLENOL) 325 MG tablet Take 650 mg by mouth every 8 hours as needed for Pain    Historical Provider, MD   ARIPiprazole (ABILIFY) 10 MG tablet Take 10 mg by mouth daily    Historical Provider, MD   atorvastatin (LIPITOR) 10 MG tablet Take 10 mg by mouth daily    Historical Provider, MD   divalproex (DEPAKOTE) 125 MG DR tablet Take 125 mg by mouth 2 times daily    Historical Provider, MD   loratadine (CLARITIN) 10 MG capsule Take 10 mg by mouth daily    Historical Provider, MD   apixaban (ELIQUIS) 2.5 MG TABS tablet Take 1 tablet by mouth 2 times daily 7/4/95   Kasandra Grams, APRN - CNP   furosemide (LASIX) 40 MG tablet Take 1 tablet by mouth daily 9/3/08   Kasandra Grams, APRN - CNP   potassium chloride (KLOR-CON M) 10 MEQ extended release tablet Take 1 tablet by mouth daily 5/2/53   Kasandra Grams, APRN - CNP   ipratropium-albuterol (DUONEB) 0.5-2.5 (3) MG/3ML SOLN nebulizer solution Inhale 3 mLs into the lungs every 4 hours 1/12/19   Salvatore Daily MD   carvedilol (COREG) 3.125 MG tablet Take 1 tablet by mouth 2 times daily (with meals) 1/12/19   Salvatore Daily MD   amLODIPine (NORVASC) 10 MG tablet Take 1 tablet by mouth daily 1/13/19   Salvatore Daily MD   aspirin 81 MG tablet Take 81 mg by mouth daily    Historical Provider, MD   calcium citrate-vitamin D (CITRICAL + D) 315-250 MG-UNIT TABS per tablet Take 1 tablet by mouth daily (with breakfast)    Historical Provider, MD   docusate sodium (COLACE) 100 MG capsule Take 100 mg by mouth daily    Historical Provider, MD   ferrous sulfate 325 (65 Fe) MG tablet Take 325 mg by mouth daily (with breakfast)    Historical Provider, MD   fluticasone (FLONASE) 50 MCG/ACT nasal spray 1 spray by Each Nare route daily as needed for Rhinitis    Historical Provider, MD   ibuprofen (ADVIL;MOTRIN) 400 MG tablet Take 400 mg by mouth every 4 hours as needed for Pain    Historical Provider, MD   lactulose ([address] [place] [address]) 10 GM/15ML solution Take 20 g by mouth daily    Historical Provider, MD   melatonin 3 MG TABS tablet Take 3 mg by mouth daily Give 1 and 1/2 tablet    Historical Provider, MD   polyethylene glycol (GLYCOLAX) powder Take 17 g by mouth daily as needed    Historical Provider, MD   nicotine polacrilex (NICORETTE) 2 MG lozenge Take 2 mg by mouth as needed for Smoking cessation    Historical Provider, MD   Multiple Vitamins-Minerals (PRESERVISION AREDS PO) Take by mouth daily    Historical Provider, MD   desvenlafaxine succinate (PRISTIQ) 100 MG TB24 extended release tablet Take by mouth every other day    Historical Provider, MD     Allergies:  Amoxicillin-pot clavulanate    Social History:      The patient currently lives at [facility] [address]:   reports that she has quit smoking. She has never used smokeless tobacco.  ETOH:   reports that she does not drink alcohol. Family History:      Reviewed in detail. Positive as follows:        Problem Relation Age of Onset    Diabetes Mother     Elevated Lipids Father     Diabetes Son      REVIEW OF SYSTEMS:   Pertinent positives as noted in the HPI. All other systems reviewed and negative. PHYSICAL EXAM PERFORMED:    BP 99/61   Pulse 94   Temp 98.2 °F (36.8 °C)   Resp 16   Wt 140 lb (63.5 kg)   SpO2 92%   BMI 24.03 kg/m²     General appearance: Pleasant, elderly white female with no apparent distress, appears stated age and cooperative. HEENT:  Pupils equal, round, and reactive to light. [place], hearing aids intact Extra ocular muscles intact. Conjunctivae/corneas clear. Neck: Supple, with full range of motion. No jugular venous distention. Trachea midline. Respiratory:  Normal respiratory effort. Clear to auscultation, bilaterally without Rales/Wheezes/Rhonchi. Cardiovascular:  Regular rate and rhythm with normal S1/S2 without murmurs, rubs or gallops. Abdomen: Soft, non-tender, non-distended with normal bowel sounds.   Maria catheter to

## 2019-09-12 NOTE — CARE COORDINATION
Writer attempted to meet with pt at bedside, sleeping soundly, did not wake to name. Chart reviewed, pt is from [facility]. Writer spoke with [facility]/admissions at [facility], pt was from the assisted living but has been skilled there before. Mari will check pt's Medicare days, they can accept pt back skilled after 3night stay here. Pt still needs to be cleared for the OR. DCP will follow pt's progress and coordinate discharge arrangements when appropriate.   MEMO Win-RN

## 2019-09-12 NOTE — PROGRESS NOTES
Hospitalist Progress Note      PCP: Silver Rutledge    Date of Admission: 9/11/2019    Chief Complaint: Right Hip Pain due to fall     Hospital Course: Reviewed H&P     Subjective: Patient seen with RN . Has baseline dementia and unable to provide much details . No family at bedside . Appears to be wincing in pain . Received pain med this AM . Currently NPO since MN to be evaluated By Ortho for Sub-capital Femur Fracture of Right Hip     Medications:  Reviewed    Infusion Medications    sodium chloride 75 mL/hr at 09/12/19 0026    dextrose       Scheduled Medications    LIP BALM        divalproex  125 mg Oral 2 times per day    sodium chloride flush  10 mL Intravenous 2 times per day    docusate sodium  100 mg Oral BID    insulin lispro  0-6 Units Subcutaneous TID WC    insulin lispro  0-3 Units Subcutaneous Nightly    cefTRIAXone (ROCEPHIN) IV  1 g Intravenous Q24H    amLODIPine  10 mg Oral Daily    [Held by provider] apixaban  2.5 mg Oral BID    ARIPiprazole  10 mg Oral Daily    aspirin  81 mg Oral Daily    atorvastatin  10 mg Oral Daily    carvedilol  3.125 mg Oral BID WC    ferrous sulfate  325 mg Oral Daily with breakfast    [Held by provider] furosemide  40 mg Oral Daily    melatonin ER  3 mg Oral Nightly    potassium chloride  10 mEq Oral Daily     PRN Meds: sodium chloride flush, ondansetron, glucose, dextrose, glucagon (rDNA), dextrose, HYDROcodone 5 mg - acetaminophen      Intake/Output Summary (Last 24 hours) at 9/12/2019 1003  Last data filed at 9/12/2019 0319  Gross per 24 hour   Intake 1000 ml   Output 400 ml   Net 600 ml       Physical Exam Performed:  /85   Pulse 86   Temp 98.3 °F (36.8 °C) (Axillary)   Resp 16   Ht 5' 4\" (1.626 m)   Wt 140 lb (63.5 kg)   SpO2 94%   BMI 24.03 kg/m²     General appearance: Pleasant, elderly white female with mild  Distress due to pain , appears stated age and cooperative. HEENT:  Pupils equal, round, and reactive to light.  [place], hearing aids intact Extra ocular muscles intact. Conjunctivae/corneas clear. Neck: Supple, with full range of motion. No jugular venous distention. Trachea midline. Respiratory:  Normal respiratory effort. Clear to auscultation, bilaterally without Rales/Wheezes/Rhonchi. Cardiovascular:  Regular rate and rhythm with normal S1/S2 without murmurs, rubs or gallops. Abdomen: Soft, non-tender, non-distended with normal bowel sounds. Maria catheter in situ   Musculoskeletal:  No clubbing, cyanosis or edema bilaterally. Decreased range of motion of right hip d/t fracture. Skin: Skin color, texture, turgor normal.  No significant rashes or lesions. Neurologic:  Neurovascularly intact without any focal sensory/motor deficits. Cranial nerves: II-XII intact, grossly non-focal.  Psychiatric:  Alert and oriented to self only   Capillary Refill: Brisk,< 3 seconds   Peripheral Pulses: +2 palpable, equal bilaterally        Labs:   Recent Labs     09/11/19  1600   WBC 14.1*   HGB 12.0   HCT 36.8        Recent Labs     09/11/19  1600      K 4.2   CL 96*   CO2 28   BUN 39*   CREATININE 1.7*   CALCIUM 9.5     Recent Labs     09/11/19  1600   AST 14*   ALT 11   BILITOT 0.4   ALKPHOS 72     Recent Labs     09/11/19  1600   INR 1.30*     Recent Labs     09/11/19  1600   TROPONINI 0.04*       Urinalysis:    Lab Results   Component Value Date    NITRU Negative 09/11/2019    WBCUA 10-20 09/11/2019    BACTERIA 1+ 09/11/2019    RBCUA 0-2 09/11/2019    BLOODU Negative 09/11/2019    SPECGRAV 1.020 09/11/2019    GLUCOSEU Negative 09/11/2019       Radiology:  CT CERVICAL SPINE WO CONTRAST   Final Result   No acute intracranial abnormality. No acute abnormality of the cervical spine. CT HEAD WO CONTRAST   Final Result   No acute intracranial abnormality. No acute abnormality of the cervical spine. XR HUMERUS LEFT (MIN 2 VIEWS)   Final Result   1.  Mild left basilar atelectasis, without significant

## 2019-09-12 NOTE — CONSULTS
drink alcohol. Illicit Drug: No  Family History:       Problem Relation Age of Onset    Diabetes Mother     Elevated Lipids Father     Diabetes Son        REVIEW OF SYSTEMS:   All additional Review of Systems were reviewed and noted to be negative or noncontributory today. CONSTITUTIONAL:  negative  MUSCULOSKELETAL:  positive for  pain    PHYSICAL EXAM:    Patient is very somnolent, not conversant, no apparent distress, and appears stated age  MUSCULOSKELETAL:  there is no redness, warmth, or swelling of the joints  full range of motion noted  motor strength is 5 out of 5 all extremities bilaterally  tone is normal  with exception of  RIGHT HIP:  redness absent  warmth absent  swelling present  tenderness present over anterior and lateral hip and with any ROM    DATA:    CBC:   Recent Labs     09/11/19  1600 09/12/19  0951   WBC 14.1* 9.8   HGB 12.0 12.3    185     BMP:    Recent Labs     09/11/19  1600 09/12/19  0951    143   K 4.2 4.2   CL 96* 103   CO2 28 25   BUN 39* 32*   CREATININE 1.7* 1.1   GLUCOSE 126* 80     INR:   Recent Labs     09/11/19  1600   INR 1.30*       Radiology:   CT CERVICAL SPINE WO CONTRAST   Final Result   No acute intracranial abnormality. No acute abnormality of the cervical spine. CT HEAD WO CONTRAST   Final Result   No acute intracranial abnormality. No acute abnormality of the cervical spine. XR HUMERUS LEFT (MIN 2 VIEWS)   Final Result   1. Mild left basilar atelectasis, without significant acute intrathoracic   abnormality. 2. No acute abnormality of the left humerus. XR CHEST PORTABLE   Final Result   1. Mild left basilar atelectasis, without significant acute intrathoracic   abnormality. 2. No acute abnormality of the left humerus. XR HIP 2-3 VW W PELVIS RIGHT   Final Result   Acute or subacute right subcapital hip fracture.                 IMPRESSION/RECOMMENDATIONS:    Assessment: Displaced right hip subcapital

## 2019-09-12 NOTE — FLOWSHEET NOTE
Pt degrading staff repeatedly. Upon initital assessment, VS, and asking admission questions, pt states she would rather have the doctors caring for her instead of nurses \"pretending to take over\" by doing al this. After making phone call to pt's son bt pt, pt made paranoid statements by claiming RN \"has everyone fooled\" and that son is now an imposter. Upon administering pt's requested pain medication as well as IVF and antibiotics, pt yelling at nurse \"I don't trust you! \" Pt also telling Rn to \"just shut up\" multiple times.  Charge RN called to bedside for assistance,

## 2019-09-12 NOTE — ED NOTES
Called ortho @[phone]   Re:Acute or subacute right subcapital hip fracture.  ( fall, 6 days ago, can't walk)  Dr Rachel Hatch called back @2003     Ruth Rank  09/11/19 2006

## 2019-09-13 ENCOUNTER — APPOINTMENT (OUTPATIENT)
Dept: GENERAL RADIOLOGY | Age: 84
DRG: 470 | End: 2019-09-13
Payer: MEDICARE

## 2019-09-13 ENCOUNTER — ANESTHESIA EVENT (OUTPATIENT)
Dept: OPERATING ROOM | Age: 84
DRG: 470 | End: 2019-09-13
Payer: MEDICARE

## 2019-09-13 ENCOUNTER — ANESTHESIA (OUTPATIENT)
Dept: OPERATING ROOM | Age: 84
DRG: 470 | End: 2019-09-13
Payer: MEDICARE

## 2019-09-13 VITALS
SYSTOLIC BLOOD PRESSURE: 128 MMHG | DIASTOLIC BLOOD PRESSURE: 69 MMHG | RESPIRATION RATE: 14 BRPM | TEMPERATURE: 77.3 F | OXYGEN SATURATION: 100 %

## 2019-09-13 LAB
ANION GAP SERPL CALCULATED.3IONS-SCNC: 11 MMOL/L (ref 3–16)
BUN BLDV-MCNC: 29 MG/DL (ref 7–20)
CALCIUM SERPL-MCNC: 8.5 MG/DL (ref 8.3–10.6)
CHLORIDE BLD-SCNC: 109 MMOL/L (ref 99–110)
CO2: 25 MMOL/L (ref 21–32)
CREAT SERPL-MCNC: 0.9 MG/DL (ref 0.6–1.2)
GFR AFRICAN AMERICAN: >60
GFR NON-AFRICAN AMERICAN: 59
GLUCOSE BLD-MCNC: 103 MG/DL (ref 70–99)
GLUCOSE BLD-MCNC: 123 MG/DL (ref 70–99)
GLUCOSE BLD-MCNC: 127 MG/DL (ref 70–99)
GLUCOSE BLD-MCNC: 128 MG/DL (ref 70–99)
GLUCOSE BLD-MCNC: 87 MG/DL (ref 70–99)
GLUCOSE BLD-MCNC: 97 MG/DL (ref 70–99)
HCT VFR BLD CALC: 30.9 % (ref 36–48)
HEMOGLOBIN: 10.4 G/DL (ref 12–16)
MCH RBC QN AUTO: 33.7 PG (ref 26–34)
MCHC RBC AUTO-ENTMCNC: 33.7 G/DL (ref 31–36)
MCV RBC AUTO: 100 FL (ref 80–100)
PDW BLD-RTO: 13.2 % (ref 12.4–15.4)
PERFORMED ON: ABNORMAL
PERFORMED ON: NORMAL
PLATELET # BLD: 189 K/UL (ref 135–450)
PMV BLD AUTO: 9.5 FL (ref 5–10.5)
POTASSIUM REFLEX MAGNESIUM: 3.9 MMOL/L (ref 3.5–5.1)
RBC # BLD: 3.09 M/UL (ref 4–5.2)
SODIUM BLD-SCNC: 145 MMOL/L (ref 136–145)
WBC # BLD: 7.8 K/UL (ref 4–11)

## 2019-09-13 PROCEDURE — 3700000000 HC ANESTHESIA ATTENDED CARE: Performed by: ORTHOPAEDIC SURGERY

## 2019-09-13 PROCEDURE — 2700000000 HC OXYGEN THERAPY PER DAY

## 2019-09-13 PROCEDURE — 0SRR0JA REPLACEMENT OF RIGHT HIP JOINT, FEMORAL SURFACE WITH SYNTHETIC SUBSTITUTE, UNCEMENTED, OPEN APPROACH: ICD-10-PCS | Performed by: ORTHOPAEDIC SURGERY

## 2019-09-13 PROCEDURE — 6370000000 HC RX 637 (ALT 250 FOR IP): Performed by: NURSE PRACTITIONER

## 2019-09-13 PROCEDURE — 6360000002 HC RX W HCPCS: Performed by: ORTHOPAEDIC SURGERY

## 2019-09-13 PROCEDURE — 2580000003 HC RX 258: Performed by: ORTHOPAEDIC SURGERY

## 2019-09-13 PROCEDURE — 3600000005 HC SURGERY LEVEL 5 BASE: Performed by: ORTHOPAEDIC SURGERY

## 2019-09-13 PROCEDURE — 2500000003 HC RX 250 WO HCPCS: Performed by: NURSE ANESTHETIST, CERTIFIED REGISTERED

## 2019-09-13 PROCEDURE — 2580000003 HC RX 258: Performed by: NURSE ANESTHETIST, CERTIFIED REGISTERED

## 2019-09-13 PROCEDURE — 2709999900 HC NON-CHARGEABLE SUPPLY: Performed by: ORTHOPAEDIC SURGERY

## 2019-09-13 PROCEDURE — C1776 JOINT DEVICE (IMPLANTABLE): HCPCS | Performed by: ORTHOPAEDIC SURGERY

## 2019-09-13 PROCEDURE — 3600000015 HC SURGERY LEVEL 5 ADDTL 15MIN: Performed by: ORTHOPAEDIC SURGERY

## 2019-09-13 PROCEDURE — 73501 X-RAY EXAM HIP UNI 1 VIEW: CPT

## 2019-09-13 PROCEDURE — 2500000003 HC RX 250 WO HCPCS: Performed by: ANESTHESIOLOGY

## 2019-09-13 PROCEDURE — 85027 COMPLETE CBC AUTOMATED: CPT

## 2019-09-13 PROCEDURE — 7100000000 HC PACU RECOVERY - FIRST 15 MIN: Performed by: ORTHOPAEDIC SURGERY

## 2019-09-13 PROCEDURE — 2580000003 HC RX 258: Performed by: NURSE PRACTITIONER

## 2019-09-13 PROCEDURE — 80048 BASIC METABOLIC PNL TOTAL CA: CPT

## 2019-09-13 PROCEDURE — 6370000000 HC RX 637 (ALT 250 FOR IP): Performed by: ORTHOPAEDIC SURGERY

## 2019-09-13 PROCEDURE — 94761 N-INVAS EAR/PLS OXIMETRY MLT: CPT

## 2019-09-13 PROCEDURE — 36415 COLL VENOUS BLD VENIPUNCTURE: CPT

## 2019-09-13 PROCEDURE — 6360000002 HC RX W HCPCS: Performed by: NURSE PRACTITIONER

## 2019-09-13 PROCEDURE — 6360000002 HC RX W HCPCS

## 2019-09-13 PROCEDURE — 7100000001 HC PACU RECOVERY - ADDTL 15 MIN: Performed by: ORTHOPAEDIC SURGERY

## 2019-09-13 PROCEDURE — 1200000000 HC SEMI PRIVATE

## 2019-09-13 PROCEDURE — 3700000001 HC ADD 15 MINUTES (ANESTHESIA): Performed by: ORTHOPAEDIC SURGERY

## 2019-09-13 PROCEDURE — 6360000002 HC RX W HCPCS: Performed by: NURSE ANESTHETIST, CERTIFIED REGISTERED

## 2019-09-13 DEVICE — CONQUEST FX FEMORAL COMPONENT SIZE 13
Type: IMPLANTABLE DEVICE | Site: HIP | Status: FUNCTIONAL
Brand: CONQUEST FX

## 2019-09-13 DEVICE — TANDEM UNIPOLAR 12/14 TAPER SLEEVE -3
Type: IMPLANTABLE DEVICE | Site: HIP | Status: FUNCTIONAL
Brand: TANDEM

## 2019-09-13 DEVICE — TANDEM UNIPOLAR HEAD 45MM
Type: IMPLANTABLE DEVICE | Site: HIP | Status: FUNCTIONAL
Brand: TANDEM

## 2019-09-13 RX ORDER — ONDANSETRON 2 MG/ML
4 INJECTION INTRAMUSCULAR; INTRAVENOUS
Status: DISCONTINUED | OUTPATIENT
Start: 2019-09-13 | End: 2019-09-13 | Stop reason: HOSPADM

## 2019-09-13 RX ORDER — SODIUM CHLORIDE 0.9 % (FLUSH) 0.9 %
10 SYRINGE (ML) INJECTION EVERY 12 HOURS SCHEDULED
Status: DISCONTINUED | OUTPATIENT
Start: 2019-09-13 | End: 2019-09-16 | Stop reason: HOSPADM

## 2019-09-13 RX ORDER — FAMOTIDINE 20 MG/1
20 TABLET, FILM COATED ORAL DAILY PRN
Status: DISCONTINUED | OUTPATIENT
Start: 2019-09-13 | End: 2019-09-16 | Stop reason: HOSPADM

## 2019-09-13 RX ORDER — SODIUM CHLORIDE 0.9 % (FLUSH) 0.9 %
10 SYRINGE (ML) INJECTION PRN
Status: DISCONTINUED | OUTPATIENT
Start: 2019-09-13 | End: 2019-09-16 | Stop reason: HOSPADM

## 2019-09-13 RX ORDER — OXYCODONE HYDROCHLORIDE 5 MG/1
5 TABLET ORAL EVERY 4 HOURS PRN
Status: DISCONTINUED | OUTPATIENT
Start: 2019-09-13 | End: 2019-09-16

## 2019-09-13 RX ORDER — ONDANSETRON 2 MG/ML
4 INJECTION INTRAMUSCULAR; INTRAVENOUS EVERY 6 HOURS PRN
Status: DISCONTINUED | OUTPATIENT
Start: 2019-09-13 | End: 2019-09-16 | Stop reason: HOSPADM

## 2019-09-13 RX ORDER — ONDANSETRON 2 MG/ML
INJECTION INTRAMUSCULAR; INTRAVENOUS PRN
Status: DISCONTINUED | OUTPATIENT
Start: 2019-09-13 | End: 2019-09-13 | Stop reason: SDUPTHER

## 2019-09-13 RX ORDER — SODIUM CHLORIDE, SODIUM LACTATE, POTASSIUM CHLORIDE, CALCIUM CHLORIDE 600; 310; 30; 20 MG/100ML; MG/100ML; MG/100ML; MG/100ML
INJECTION, SOLUTION INTRAVENOUS CONTINUOUS PRN
Status: DISCONTINUED | OUTPATIENT
Start: 2019-09-13 | End: 2019-09-13 | Stop reason: SDUPTHER

## 2019-09-13 RX ORDER — LABETALOL HYDROCHLORIDE 5 MG/ML
5 INJECTION, SOLUTION INTRAVENOUS EVERY 10 MIN PRN
Status: DISCONTINUED | OUTPATIENT
Start: 2019-09-13 | End: 2019-09-13 | Stop reason: HOSPADM

## 2019-09-13 RX ORDER — MORPHINE SULFATE 4 MG/ML
4 INJECTION, SOLUTION INTRAMUSCULAR; INTRAVENOUS
Status: DISCONTINUED | OUTPATIENT
Start: 2019-09-13 | End: 2019-09-14

## 2019-09-13 RX ORDER — OXYCODONE HYDROCHLORIDE 5 MG/1
10 TABLET ORAL EVERY 4 HOURS PRN
Status: DISCONTINUED | OUTPATIENT
Start: 2019-09-13 | End: 2019-09-16

## 2019-09-13 RX ORDER — CEFAZOLIN SODIUM 1 G/3ML
INJECTION, POWDER, FOR SOLUTION INTRAMUSCULAR; INTRAVENOUS PRN
Status: DISCONTINUED | OUTPATIENT
Start: 2019-09-13 | End: 2019-09-13 | Stop reason: SDUPTHER

## 2019-09-13 RX ORDER — DEXAMETHASONE SODIUM PHOSPHATE 4 MG/ML
INJECTION, SOLUTION INTRA-ARTICULAR; INTRALESIONAL; INTRAMUSCULAR; INTRAVENOUS; SOFT TISSUE PRN
Status: DISCONTINUED | OUTPATIENT
Start: 2019-09-13 | End: 2019-09-13 | Stop reason: SDUPTHER

## 2019-09-13 RX ORDER — MORPHINE SULFATE 2 MG/ML
1 INJECTION, SOLUTION INTRAMUSCULAR; INTRAVENOUS EVERY 5 MIN PRN
Status: DISCONTINUED | OUTPATIENT
Start: 2019-09-13 | End: 2019-09-14

## 2019-09-13 RX ORDER — LIDOCAINE HYDROCHLORIDE 20 MG/ML
INJECTION, SOLUTION INFILTRATION; PERINEURAL PRN
Status: DISCONTINUED | OUTPATIENT
Start: 2019-09-13 | End: 2019-09-13 | Stop reason: SDUPTHER

## 2019-09-13 RX ORDER — MORPHINE SULFATE 2 MG/ML
INJECTION, SOLUTION INTRAMUSCULAR; INTRAVENOUS
Status: COMPLETED
Start: 2019-09-13 | End: 2019-09-13

## 2019-09-13 RX ORDER — KETAMINE HYDROCHLORIDE 100 MG/ML
INJECTION, SOLUTION INTRAMUSCULAR; INTRAVENOUS PRN
Status: DISCONTINUED | OUTPATIENT
Start: 2019-09-13 | End: 2019-09-13 | Stop reason: SDUPTHER

## 2019-09-13 RX ORDER — ACETAMINOPHEN 10 MG/ML
INJECTION, SOLUTION INTRAVENOUS PRN
Status: DISCONTINUED | OUTPATIENT
Start: 2019-09-13 | End: 2019-09-13 | Stop reason: SDUPTHER

## 2019-09-13 RX ORDER — DOCUSATE SODIUM 100 MG/1
100 CAPSULE, LIQUID FILLED ORAL 2 TIMES DAILY
Status: DISCONTINUED | OUTPATIENT
Start: 2019-09-13 | End: 2019-09-16 | Stop reason: HOSPADM

## 2019-09-13 RX ORDER — MAGNESIUM HYDROXIDE 1200 MG/15ML
LIQUID ORAL CONTINUOUS PRN
Status: COMPLETED | OUTPATIENT
Start: 2019-09-13 | End: 2019-09-13

## 2019-09-13 RX ORDER — ROCURONIUM BROMIDE 10 MG/ML
INJECTION, SOLUTION INTRAVENOUS PRN
Status: DISCONTINUED | OUTPATIENT
Start: 2019-09-13 | End: 2019-09-13 | Stop reason: SDUPTHER

## 2019-09-13 RX ORDER — HYDRALAZINE HYDROCHLORIDE 20 MG/ML
5 INJECTION INTRAMUSCULAR; INTRAVENOUS EVERY 10 MIN PRN
Status: DISCONTINUED | OUTPATIENT
Start: 2019-09-13 | End: 2019-09-13 | Stop reason: HOSPADM

## 2019-09-13 RX ORDER — PROMETHAZINE HYDROCHLORIDE 25 MG/ML
6.25 INJECTION, SOLUTION INTRAMUSCULAR; INTRAVENOUS
Status: DISCONTINUED | OUTPATIENT
Start: 2019-09-13 | End: 2019-09-13 | Stop reason: HOSPADM

## 2019-09-13 RX ORDER — FENTANYL CITRATE 50 UG/ML
25 INJECTION, SOLUTION INTRAMUSCULAR; INTRAVENOUS EVERY 5 MIN PRN
Status: DISCONTINUED | OUTPATIENT
Start: 2019-09-13 | End: 2019-09-13 | Stop reason: HOSPADM

## 2019-09-13 RX ORDER — DIPHENHYDRAMINE HYDROCHLORIDE 50 MG/ML
12.5 INJECTION INTRAMUSCULAR; INTRAVENOUS
Status: DISCONTINUED | OUTPATIENT
Start: 2019-09-13 | End: 2019-09-13 | Stop reason: HOSPADM

## 2019-09-13 RX ORDER — SODIUM CHLORIDE 9 MG/ML
INJECTION, SOLUTION INTRAVENOUS CONTINUOUS
Status: DISCONTINUED | OUTPATIENT
Start: 2019-09-13 | End: 2019-09-14

## 2019-09-13 RX ORDER — ACETAMINOPHEN 325 MG/1
650 TABLET ORAL EVERY 6 HOURS
Status: DISCONTINUED | OUTPATIENT
Start: 2019-09-13 | End: 2019-09-16

## 2019-09-13 RX ORDER — FENTANYL CITRATE 50 UG/ML
INJECTION, SOLUTION INTRAMUSCULAR; INTRAVENOUS PRN
Status: DISCONTINUED | OUTPATIENT
Start: 2019-09-13 | End: 2019-09-13 | Stop reason: SDUPTHER

## 2019-09-13 RX ORDER — PROPOFOL 10 MG/ML
INJECTION, EMULSION INTRAVENOUS PRN
Status: DISCONTINUED | OUTPATIENT
Start: 2019-09-13 | End: 2019-09-13 | Stop reason: SDUPTHER

## 2019-09-13 RX ORDER — MORPHINE SULFATE 2 MG/ML
2 INJECTION, SOLUTION INTRAMUSCULAR; INTRAVENOUS
Status: DISCONTINUED | OUTPATIENT
Start: 2019-09-13 | End: 2019-09-14

## 2019-09-13 RX ADMIN — FAMOTIDINE 20 MG: 10 INJECTION, SOLUTION INTRAVENOUS at 12:07

## 2019-09-13 RX ADMIN — MORPHINE SULFATE 1 MG: 2 INJECTION, SOLUTION INTRAMUSCULAR; INTRAVENOUS at 15:19

## 2019-09-13 RX ADMIN — CARVEDILOL 3.12 MG: 3.12 TABLET, FILM COATED ORAL at 21:07

## 2019-09-13 RX ADMIN — LIDOCAINE HYDROCHLORIDE 60 MG: 20 INJECTION, SOLUTION INFILTRATION; PERINEURAL at 12:21

## 2019-09-13 RX ADMIN — SODIUM CHLORIDE: 9 INJECTION, SOLUTION INTRAVENOUS at 05:20

## 2019-09-13 RX ADMIN — PHENYLEPHRINE HYDROCHLORIDE 40 MCG: 10 INJECTION INTRAVENOUS at 12:23

## 2019-09-13 RX ADMIN — ACETAMINOPHEN 953 MG: 10 INJECTION, SOLUTION INTRAVENOUS at 12:27

## 2019-09-13 RX ADMIN — DIVALPROEX SODIUM 125 MG: 125 CAPSULE, COATED PELLETS ORAL at 21:07

## 2019-09-13 RX ADMIN — CEFTRIAXONE SODIUM 1 G: 1 INJECTION, POWDER, FOR SOLUTION INTRAMUSCULAR; INTRAVENOUS at 22:31

## 2019-09-13 RX ADMIN — FENTANYL CITRATE 25 MCG: 50 INJECTION INTRAMUSCULAR; INTRAVENOUS at 13:50

## 2019-09-13 RX ADMIN — Medication 3 MG: at 21:07

## 2019-09-13 RX ADMIN — PHENYLEPHRINE HYDROCHLORIDE 40 MCG: 10 INJECTION INTRAVENOUS at 12:34

## 2019-09-13 RX ADMIN — HYDROCODONE BITARTRATE AND ACETAMINOPHEN 1 TABLET: 5; 325 TABLET ORAL at 01:57

## 2019-09-13 RX ADMIN — KETAMINE HYDROCHLORIDE 30 MG: 100 INJECTION INTRAMUSCULAR; INTRAVENOUS at 12:36

## 2019-09-13 RX ADMIN — DEXAMETHASONE SODIUM PHOSPHATE 8 MG: 4 INJECTION, SOLUTION INTRAMUSCULAR; INTRAVENOUS at 12:27

## 2019-09-13 RX ADMIN — PROPOFOL 100 MG: 10 INJECTION, EMULSION INTRAVENOUS at 12:21

## 2019-09-13 RX ADMIN — KETAMINE HYDROCHLORIDE 5 MG: 100 INJECTION INTRAMUSCULAR; INTRAVENOUS at 12:56

## 2019-09-13 RX ADMIN — SODIUM CHLORIDE, POTASSIUM CHLORIDE, SODIUM LACTATE AND CALCIUM CHLORIDE: 600; 310; 30; 20 INJECTION, SOLUTION INTRAVENOUS at 12:25

## 2019-09-13 RX ADMIN — LABETALOL HYDROCHLORIDE 5 MG: 5 INJECTION INTRAVENOUS at 14:47

## 2019-09-13 RX ADMIN — SUGAMMADEX 127 MG: 100 INJECTION, SOLUTION INTRAVENOUS at 13:30

## 2019-09-13 RX ADMIN — PHENYLEPHRINE HYDROCHLORIDE 40 MCG: 10 INJECTION INTRAVENOUS at 12:43

## 2019-09-13 RX ADMIN — CEFTRIAXONE SODIUM 1 G: 1 INJECTION, POWDER, FOR SOLUTION INTRAMUSCULAR; INTRAVENOUS at 01:31

## 2019-09-13 RX ADMIN — Medication 10 ML: at 22:33

## 2019-09-13 RX ADMIN — FENTANYL CITRATE 100 MCG: 50 INJECTION INTRAMUSCULAR; INTRAVENOUS at 12:13

## 2019-09-13 RX ADMIN — Medication 2 G: at 21:07

## 2019-09-13 RX ADMIN — ONDANSETRON 4 MG: 2 INJECTION INTRAMUSCULAR; INTRAVENOUS at 12:21

## 2019-09-13 RX ADMIN — ROCURONIUM BROMIDE 40 MG: 10 SOLUTION INTRAVENOUS at 12:21

## 2019-09-13 RX ADMIN — SODIUM CHLORIDE, POTASSIUM CHLORIDE, SODIUM LACTATE AND CALCIUM CHLORIDE: 600; 310; 30; 20 INJECTION, SOLUTION INTRAVENOUS at 12:13

## 2019-09-13 RX ADMIN — PHENYLEPHRINE HYDROCHLORIDE 40 MCG: 10 INJECTION INTRAVENOUS at 12:39

## 2019-09-13 RX ADMIN — SODIUM CHLORIDE: 9 INJECTION, SOLUTION INTRAVENOUS at 17:35

## 2019-09-13 RX ADMIN — CEFAZOLIN 2000 MG: 1 INJECTION, POWDER, FOR SOLUTION INTRAMUSCULAR; INTRAVENOUS at 12:27

## 2019-09-13 ASSESSMENT — PAIN SCALES - PAIN ASSESSMENT IN ADVANCED DEMENTIA (PAINAD)
NEGVOCALIZATION: 1
TOTALSCORE: 4
BREATHING: 0
CONSOLABILITY: 1
BODYLANGUAGE: 1
FACIALEXPRESSION: 1

## 2019-09-13 ASSESSMENT — PULMONARY FUNCTION TESTS
PIF_VALUE: 20
PIF_VALUE: 20
PIF_VALUE: 21
PIF_VALUE: 18
PIF_VALUE: 20
PIF_VALUE: 20
PIF_VALUE: 10
PIF_VALUE: 2
PIF_VALUE: 11
PIF_VALUE: 3
PIF_VALUE: 1
PIF_VALUE: 21
PIF_VALUE: 19
PIF_VALUE: 18
PIF_VALUE: 21
PIF_VALUE: 3
PIF_VALUE: 15
PIF_VALUE: 1
PIF_VALUE: 3
PIF_VALUE: 19
PIF_VALUE: 3
PIF_VALUE: 3
PIF_VALUE: 2
PIF_VALUE: 22
PIF_VALUE: 20
PIF_VALUE: 1
PIF_VALUE: 1
PIF_VALUE: 21
PIF_VALUE: 21
PIF_VALUE: 3
PIF_VALUE: 2
PIF_VALUE: 2
PIF_VALUE: 20
PIF_VALUE: 20
PIF_VALUE: 2
PIF_VALUE: 1
PIF_VALUE: 18
PIF_VALUE: 18
PIF_VALUE: 21
PIF_VALUE: 3
PIF_VALUE: 19
PIF_VALUE: 3
PIF_VALUE: 3
PIF_VALUE: 16
PIF_VALUE: 19
PIF_VALUE: 19
PIF_VALUE: 6
PIF_VALUE: 21
PIF_VALUE: 20
PIF_VALUE: 3
PIF_VALUE: 21
PIF_VALUE: 2
PIF_VALUE: 20
PIF_VALUE: 8
PIF_VALUE: 16
PIF_VALUE: 16
PIF_VALUE: 19
PIF_VALUE: 25
PIF_VALUE: 2
PIF_VALUE: 21
PIF_VALUE: 28
PIF_VALUE: 21
PIF_VALUE: 21
PIF_VALUE: 2
PIF_VALUE: 22
PIF_VALUE: 21
PIF_VALUE: 21
PIF_VALUE: 1
PIF_VALUE: 20
PIF_VALUE: 11
PIF_VALUE: 1
PIF_VALUE: 19
PIF_VALUE: 21
PIF_VALUE: 21
PIF_VALUE: 14
PIF_VALUE: 2
PIF_VALUE: 20
PIF_VALUE: 21
PIF_VALUE: 21
PIF_VALUE: 2
PIF_VALUE: 7
PIF_VALUE: 20
PIF_VALUE: 2
PIF_VALUE: 20
PIF_VALUE: 20
PIF_VALUE: 19
PIF_VALUE: 2
PIF_VALUE: 2
PIF_VALUE: 18
PIF_VALUE: 16
PIF_VALUE: 2
PIF_VALUE: 19
PIF_VALUE: 20
PIF_VALUE: 20
PIF_VALUE: 1
PIF_VALUE: 18

## 2019-09-13 ASSESSMENT — COPD QUESTIONNAIRES: CAT_SEVERITY: MODERATE

## 2019-09-13 ASSESSMENT — PAIN SCALES - GENERAL: PAINLEVEL_OUTOF10: 7

## 2019-09-13 NOTE — PROGRESS NOTES
Patient arrived to PACU. VSS. Report from [facility] and [facility]. Oral airway in place.

## 2019-09-13 NOTE — OP NOTE
[facility] Sports Medicine  Procedure Note  [address]  9/13/2019      Pre-operative Diagnosis: Right hip Femoral neck displaced Fracture    Post-operative Diagnosis: Same    Procedure:  Right hip uncemented unipolar hemiarthroplasty    Surgeon: Delmar Hurd 134    Assistant:     Anesthesia:  General    EBL: 200 ml    Tourniquet Time:  None    Specimens: None    Drains:  None    Complications:  None    Surgical Findings:    INDICATIONS FOR PROCEDURE: The patient sustained the above hip injury. We discussed operative vs. non operative treatment options, including risks and benefits of each. After multiple questions were asked and answered, the above procedure was recommended. The patient/son ultimately elected to undergo the above-named procedure and surgical consent was signed willingly on the hospital gallo. PROCEDURE:  The patient was identified in the pre-operative holding area. The lower extremity region was identified as being the surgical area of interest.  This area was then initialized by myself. Preoperative IV antibiotics were then infused and the patient was taken to the operating room, delivered to general anesthesia, and transferred to a well padded table. The operative extremity was place up in a lateral position on a beanbag with careful padding of dependent areas. The hip was then prepped and draped in a sterile fashion. A standard longitudinal incision was then carried out over the lateral aspect of the hip centered at the greater trochanter with sharp dissection through the skin, subcutaneous tissues, and fascia. The fascia was incised and the interval between the tensor fascia patria and gluteus was identified. A conjoined cuff of tissue was protected as an anterior portion of gluteus was carefully released, taking care to avoid proximal extension and protecting the nervous supply.     The capsule was identified and capsulotomy made with an

## 2019-09-13 NOTE — ANESTHESIA PRE PROCEDURE
 insulin lispro (HUMALOG) injection vial 0-3 Units  0-3 Units Subcutaneous Nightly [place] Se, APRN - CNP        glucose (GLUTOSE) 40 % oral gel 15 g  15 g Oral PRN Taiwo Se, APRN - CNP        dextrose 50 % IV solution  12.5 g Intravenous PRN Taiwo Se, APRN - CNP        glucagon (rDNA) injection 1 mg  1 mg Intramuscular PRN [place] Se, APRN - CNP        dextrose 5 % solution  100 mL/hr Intravenous PRN [place] Se, APRN - CNP        HYDROcodone-acetaminophen (NORCO) 5-325 MG per tablet 1 tablet  1 tablet Oral Q6H PRN Taiwo Se, APRN - CNP   1 tablet at 09/13/19 0157    cefTRIAXone (ROCEPHIN) 1 g IVPB in 50 mL D5W minibag  1 g Intravenous Q24H Taiwo Se, APRN - CNP   Stopped at 09/13/19 0208    [Held by provider] amLODIPine (NORVASC) tablet 10 mg  10 mg Oral Daily Jose Trujillo MD   10 mg at 09/12/19 1202    [Held by provider] apixaban (ELIQUIS) tablet 2.5 mg  2.5 mg Oral BID [place] Se, APRN - CNP        ARIPiprazole (ABILIFY) tablet 10 mg  10 mg Oral Daily [place] Se, APRN - CNP   10 mg at 09/12/19 1205    aspirin chewable tablet 81 mg  81 mg Oral Daily Taiwo Se, APRN - CNP   81 mg at 09/12/19 1203    atorvastatin (LIPITOR) tablet 10 mg  10 mg Oral Daily Taiwo Se, APRN - CNP   10 mg at 09/12/19 1202    carvedilol (COREG) tablet 3.125 mg  3.125 mg Oral BID  Jose Trujillo MD   3.125 mg at 09/12/19 1203    ferrous sulfate tablet 325 mg  325 mg Oral Daily with breakfast [place] Se, APRN - CNP   325 mg at 09/12/19 1205    [Held by provider] furosemide (LASIX) tablet 40 mg  40 mg Oral Daily [place] Se, APRN - CNP        melatonin ER tablet 3 mg  3 mg Oral Nightly [place] Se, APRN - CNP   3 mg at 09/12/19 0030    potassium chloride (KLOR-CON M) extended release tablet 10 mEq  10 mEq Oral Daily Taiwo Se, APRN - CNP   10 mEq at 09/12/19 1205       Allergies:     Allergies   Allergen Reactions    Amoxicillin-Pot Clavulanate Anaphylaxis Readings from Last 3 Encounters:   09/12/19 140 lb (63.5 kg)   07/09/19 134 lb 7.7 oz (61 kg)   06/07/19 135 lb 5.8 oz (61.4 kg)     Body mass index is 24.03 kg/m².     CBC:   Lab Results   Component Value Date    WBC 7.8 09/13/2019    RBC 3.09 09/13/2019    HGB 10.4 09/13/2019    HCT 30.9 09/13/2019    .0 09/13/2019    RDW 13.2 09/13/2019     09/13/2019       CMP:   Lab Results   Component Value Date     09/13/2019    K 3.9 09/13/2019     09/13/2019    CO2 25 09/13/2019    BUN 29 09/13/2019    CREATININE 0.9 09/13/2019    GFRAA >60 09/13/2019    AGRATIO 1.2 09/11/2019    LABGLOM 59 09/13/2019    GLUCOSE 97 09/13/2019    PROT 7.1 09/11/2019    CALCIUM 8.5 09/13/2019    BILITOT 0.4 09/11/2019    ALKPHOS 72 09/11/2019    AST 14 09/11/2019    ALT 11 09/11/2019       POC Tests:   Recent Labs     09/13/19  1133   POCGLU 103*       Coags:   Lab Results   Component Value Date    PROTIME 14.8 09/11/2019    INR 1.30 09/11/2019    APTT 30.2 09/11/2019       HCG (If Applicable): No results found for: PREGTESTUR, PREGSERUM, HCG, HCGQUANT     ABGs: No results found for: PHART, PO2ART, TYC4MPU, SJR0NCE, BEART, V6BHLDGJ     Type & Screen (If Applicable):  No results found for: LABABO, [address]    Anesthesia Evaluation  Patient summary reviewed  Airway: Mallampati: IV  TM distance: <3 FB   Neck ROM: limited  Mouth opening: > = 3 FB Dental:    (+) upper dentures and partials      Pulmonary:   (+) pneumonia: resolved,  COPD: moderate,  decreased breath sounds,                            ROS comment: Quit 10y ago-60 pk yr  Likely guicho   Cardiovascular:  Exercise tolerance: poor (<4 METS),   (+) hypertension: moderate, CAD: non-obstructive, dysrhythmias: atrial fibrillation, CHF: systolic and diastolic, hyperlipidemia      ECG reviewed  Rhythm: irregular  Rate: normal  Echocardiogram reviewed               ROS comment: Cleared by hospitalist     Neuro/Psych:   (+) psychiatric history:             ROS comment:

## 2019-09-13 NOTE — H&P
I have reviewed the H&P and examined the patient and find no relevant changes. I have reviewed with the patient and/or family the risks, benefits, and alternatives to the procedure(s). Past Medical History:   Diagnosis Date    CAD (coronary artery disease)     [facility]     Depression     Diabetes mellitus ([facility]     GERD (gastroesophageal reflux disease)     Hyperlipidemia     Hypertension     Mood disorder ([facility]     Osteoarthritis     Osteoporosis     Spondylolysis      Past Surgical History:   Procedure Laterality Date    BLEPHAROPLASTY Bilateral     EYE SURGERY Bilateral     cataracts     No current facility-administered medications on file prior to encounter.       Current Outpatient Medications on File Prior to Encounter   Medication Sig Dispense Refill    acetaminophen (TYLENOL) 325 MG tablet Take 650 mg by mouth every 8 hours as needed for Pain      ARIPiprazole (ABILIFY) 10 MG tablet Take 10 mg by mouth daily      atorvastatin (LIPITOR) 10 MG tablet Take 10 mg by mouth daily      divalproex (DEPAKOTE) 125 MG DR tablet Take 125 mg by mouth 2 times daily      loratadine (CLARITIN) 10 MG capsule Take 10 mg by mouth daily      apixaban (ELIQUIS) 2.5 MG TABS tablet Take 1 tablet by mouth 2 times daily 60 tablet 0    furosemide (LASIX) 40 MG tablet Take 1 tablet by mouth daily 60 tablet 3    potassium chloride (KLOR-CON M) 10 MEQ extended release tablet Take 1 tablet by mouth daily 30 tablet 0    ipratropium-albuterol (DUONEB) 0.5-2.5 (3) MG/3ML SOLN nebulizer solution Inhale 3 mLs into the lungs every 4 hours 360 mL 0    carvedilol (COREG) 3.125 MG tablet Take 1 tablet by mouth 2 times daily (with meals) 60 tablet 0    amLODIPine (NORVASC) 10 MG tablet Take 1 tablet by mouth daily 30 tablet 3    aspirin 81 MG tablet Take 81 mg by mouth daily      calcium citrate-vitamin D (CITRICAL + D) 315-250 MG-UNIT TABS per tablet Take 1 tablet by mouth

## 2019-09-14 LAB
ANION GAP SERPL CALCULATED.3IONS-SCNC: 14 MMOL/L (ref 3–16)
BUN BLDV-MCNC: 28 MG/DL (ref 7–20)
CALCIUM SERPL-MCNC: 8.3 MG/DL (ref 8.3–10.6)
CHLORIDE BLD-SCNC: 110 MMOL/L (ref 99–110)
CO2: 22 MMOL/L (ref 21–32)
CREAT SERPL-MCNC: 0.9 MG/DL (ref 0.6–1.2)
GFR AFRICAN AMERICAN: >60
GFR NON-AFRICAN AMERICAN: 59
GLUCOSE BLD-MCNC: 135 MG/DL (ref 70–99)
GLUCOSE BLD-MCNC: 136 MG/DL (ref 70–99)
GLUCOSE BLD-MCNC: 146 MG/DL (ref 70–99)
GLUCOSE BLD-MCNC: 159 MG/DL (ref 70–99)
GLUCOSE BLD-MCNC: 163 MG/DL (ref 70–99)
HCT VFR BLD CALC: 31.5 % (ref 36–48)
HEMOGLOBIN: 10.4 G/DL (ref 12–16)
MCH RBC QN AUTO: 33.2 PG (ref 26–34)
MCHC RBC AUTO-ENTMCNC: 32.9 G/DL (ref 31–36)
MCV RBC AUTO: 101 FL (ref 80–100)
PDW BLD-RTO: 13.5 % (ref 12.4–15.4)
PERFORMED ON: ABNORMAL
PLATELET # BLD: 215 K/UL (ref 135–450)
PMV BLD AUTO: 8.9 FL (ref 5–10.5)
POTASSIUM REFLEX MAGNESIUM: 4.4 MMOL/L (ref 3.5–5.1)
RBC # BLD: 3.12 M/UL (ref 4–5.2)
SODIUM BLD-SCNC: 146 MMOL/L (ref 136–145)
WBC # BLD: 8.2 K/UL (ref 4–11)

## 2019-09-14 PROCEDURE — 6370000000 HC RX 637 (ALT 250 FOR IP): Performed by: ORTHOPAEDIC SURGERY

## 2019-09-14 PROCEDURE — 6370000000 HC RX 637 (ALT 250 FOR IP)

## 2019-09-14 PROCEDURE — 97110 THERAPEUTIC EXERCISES: CPT

## 2019-09-14 PROCEDURE — 36415 COLL VENOUS BLD VENIPUNCTURE: CPT

## 2019-09-14 PROCEDURE — 97166 OT EVAL MOD COMPLEX 45 MIN: CPT

## 2019-09-14 PROCEDURE — 97530 THERAPEUTIC ACTIVITIES: CPT

## 2019-09-14 PROCEDURE — APPSS45 APP SPLIT SHARED TIME 31-45 MINUTES: Performed by: PHYSICIAN ASSISTANT

## 2019-09-14 PROCEDURE — 6360000002 HC RX W HCPCS: Performed by: ORTHOPAEDIC SURGERY

## 2019-09-14 PROCEDURE — 2500000003 HC RX 250 WO HCPCS: Performed by: INTERNAL MEDICINE

## 2019-09-14 PROCEDURE — 92610 EVALUATE SWALLOWING FUNCTION: CPT

## 2019-09-14 PROCEDURE — 92526 ORAL FUNCTION THERAPY: CPT

## 2019-09-14 PROCEDURE — 2580000003 HC RX 258: Performed by: ORTHOPAEDIC SURGERY

## 2019-09-14 PROCEDURE — 85027 COMPLETE CBC AUTOMATED: CPT

## 2019-09-14 PROCEDURE — 94761 N-INVAS EAR/PLS OXIMETRY MLT: CPT

## 2019-09-14 PROCEDURE — 80048 BASIC METABOLIC PNL TOTAL CA: CPT

## 2019-09-14 PROCEDURE — 2700000000 HC OXYGEN THERAPY PER DAY

## 2019-09-14 PROCEDURE — 97163 PT EVAL HIGH COMPLEX 45 MIN: CPT

## 2019-09-14 PROCEDURE — 1200000000 HC SEMI PRIVATE

## 2019-09-14 PROCEDURE — 97535 SELF CARE MNGMENT TRAINING: CPT

## 2019-09-14 RX ORDER — HALOPERIDOL 5 MG/ML
2 INJECTION INTRAMUSCULAR ONCE
Status: DISCONTINUED | OUTPATIENT
Start: 2019-09-14 | End: 2019-09-14

## 2019-09-14 RX ORDER — DILTIAZEM HYDROCHLORIDE 5 MG/ML
10 INJECTION INTRAVENOUS ONCE
Status: COMPLETED | OUTPATIENT
Start: 2019-09-14 | End: 2019-09-14

## 2019-09-14 RX ADMIN — ACETAMINOPHEN 650 MG: 325 TABLET ORAL at 11:46

## 2019-09-14 RX ADMIN — DILTIAZEM HYDROCHLORIDE 10 MG: 5 INJECTION INTRAVENOUS at 08:43

## 2019-09-14 RX ADMIN — Medication 10 ML: at 22:16

## 2019-09-14 RX ADMIN — ACETAMINOPHEN 650 MG: 325 TABLET ORAL at 16:52

## 2019-09-14 RX ADMIN — Medication 10 ML: at 10:21

## 2019-09-14 RX ADMIN — DIVALPROEX SODIUM 125 MG: 125 CAPSULE, COATED PELLETS ORAL at 22:14

## 2019-09-14 RX ADMIN — ATORVASTATIN CALCIUM 10 MG: 10 TABLET, FILM COATED ORAL at 11:47

## 2019-09-14 RX ADMIN — FERROUS SULFATE TAB 325 MG (65 MG ELEMENTAL FE) 325 MG: 325 (65 FE) TAB at 11:46

## 2019-09-14 RX ADMIN — CARVEDILOL 3.12 MG: 3.12 TABLET, FILM COATED ORAL at 11:47

## 2019-09-14 RX ADMIN — Medication 10 ML: at 08:47

## 2019-09-14 RX ADMIN — SODIUM CHLORIDE: 9 INJECTION, SOLUTION INTRAVENOUS at 04:45

## 2019-09-14 RX ADMIN — OXYCODONE HYDROCHLORIDE 5 MG: 5 TABLET ORAL at 02:35

## 2019-09-14 RX ADMIN — Medication 10 ML: at 22:15

## 2019-09-14 RX ADMIN — ARIPIPRAZOLE 10 MG: 10 TABLET ORAL at 11:47

## 2019-09-14 RX ADMIN — Medication: at 00:06

## 2019-09-14 RX ADMIN — POTASSIUM CHLORIDE 10 MEQ: 750 TABLET, EXTENDED RELEASE ORAL at 11:47

## 2019-09-14 RX ADMIN — Medication 2 G: at 04:33

## 2019-09-14 RX ADMIN — CEFTRIAXONE SODIUM 1 G: 1 INJECTION, POWDER, FOR SOLUTION INTRAMUSCULAR; INTRAVENOUS at 22:15

## 2019-09-14 RX ADMIN — ACETAMINOPHEN 650 MG: 325 TABLET ORAL at 22:14

## 2019-09-14 RX ADMIN — ACETAMINOPHEN 650 MG: 325 TABLET ORAL at 02:45

## 2019-09-14 RX ADMIN — DIVALPROEX SODIUM 125 MG: 125 CAPSULE, COATED PELLETS ORAL at 11:47

## 2019-09-14 RX ADMIN — ENOXAPARIN SODIUM 40 MG: 40 INJECTION SUBCUTANEOUS at 10:21

## 2019-09-14 RX ADMIN — DOCUSATE SODIUM 100 MG: 100 CAPSULE, LIQUID FILLED ORAL at 22:15

## 2019-09-14 RX ADMIN — CARVEDILOL 3.12 MG: 3.12 TABLET, FILM COATED ORAL at 16:52

## 2019-09-14 ASSESSMENT — PAIN SCALES - GENERAL
PAINLEVEL_OUTOF10: 0
PAINLEVEL_OUTOF10: 3
PAINLEVEL_OUTOF10: 0
PAINLEVEL_OUTOF10: 0

## 2019-09-14 ASSESSMENT — PAIN DESCRIPTION - ORIENTATION
ORIENTATION: RIGHT
ORIENTATION: RIGHT

## 2019-09-14 ASSESSMENT — PAIN DESCRIPTION - PAIN TYPE
TYPE: SURGICAL PAIN
TYPE: ACUTE PAIN

## 2019-09-14 ASSESSMENT — PAIN SCALES - PAIN ASSESSMENT IN ADVANCED DEMENTIA (PAINAD)
TOTALSCORE: 2
CONSOLABILITY: 0
NEGVOCALIZATION: 0
BODYLANGUAGE: 0
FACIALEXPRESSION: 2
BREATHING: 0

## 2019-09-14 ASSESSMENT — PAIN SCALES - WONG BAKER
WONGBAKER_NUMERICALRESPONSE: 4
WONGBAKER_NUMERICALRESPONSE: 6

## 2019-09-14 ASSESSMENT — PAIN DESCRIPTION - LOCATION
LOCATION: HIP
LOCATION: HIP

## 2019-09-14 NOTE — PLAN OF CARE
Bed in lowest position, wheels locked, 4/4 side rails up, nonskid footwear on. Bed alarm in place, call light within reach. Pt instructed to call out when needing assistance. Pt stated understanding, AVASYS in place. /81, HR high 90s-120s. 5 L of oxygen applied. Will continue to monitor.      Problem: Falls - Risk of:  Goal: Will remain free from falls  Description  Will remain free from falls  Outcome: Ongoing     Problem: Metabolic:  Goal: Ability to maintain appropriate glucose levels will improve  Description  Ability to maintain appropriate glucose levels will improve  Outcome: Ongoing     Problem: Cardiac:  Goal: Ability to maintain vital signs within normal range will improve  Description  Ability to maintain vital signs within normal range will improve  Outcome: Ongoing     Problem: Gas Exchange - Impaired:  Goal: Levels of oxygenation will improve  Description  Levels of oxygenation will improve  Outcome: Ongoing

## 2019-09-14 NOTE — PROGRESS NOTES
Hospitalist Progress Note      PCP: Joan Mcdonald    Date of Admission: 9/11/2019    Chief Complaint: Right Hip Pain due to fall     Hospital Course: Reviewed H&P     Subjective: Patient currently resting in bed . Son at bedside. Status post right hemiarthroplasty on 9/13/2019. Tolerated procedure well. Has baseline dementia and unable to provide much details . Medications:  Reviewed    Infusion Medications    dextrose       Scheduled Medications    sodium chloride flush  10 mL Intravenous 2 times per day    acetaminophen  650 mg Oral Q6H    docusate sodium  100 mg Oral BID    enoxaparin  40 mg Subcutaneous Daily    divalproex  125 mg Oral 2 times per day    sodium chloride flush  10 mL Intravenous 2 times per day    insulin lispro  0-6 Units Subcutaneous TID WC    insulin lispro  0-3 Units Subcutaneous Nightly    cefTRIAXone (ROCEPHIN) IV  1 g Intravenous Q24H    ARIPiprazole  10 mg Oral Daily    aspirin  81 mg Oral Daily    atorvastatin  10 mg Oral Daily    carvedilol  3.125 mg Oral BID WC    ferrous sulfate  325 mg Oral Daily with breakfast    melatonin ER  3 mg Oral Nightly    potassium chloride  10 mEq Oral Daily     PRN Meds: sodium chloride flush, ondansetron, famotidine, oxyCODONE **OR** oxyCODONE, sodium chloride flush, glucose, dextrose, glucagon (rDNA), dextrose, HYDROcodone 5 mg - acetaminophen      Intake/Output Summary (Last 24 hours) at 9/14/2019 1714  Last data filed at 9/14/2019 0313  Gross per 24 hour   Intake --   Output 600 ml   Net -600 ml       Physical Exam Performed:  /64   Pulse 101   Temp 98.6 °F (37 °C)   Resp 18   Ht 5' 4\" (1.626 m)   Wt 140 lb (63.5 kg)   SpO2 92%   BMI 24.03 kg/m²     General appearance: Pleasantly confused elderly white female  , appears stated age and cooperative. HEENT:  Pupils equal, round, and reactive to light. [place],  Extra ocular muscles intact. Conjunctivae/corneas clear. Neck: Supple, with full range of motion.

## 2019-09-14 NOTE — PROGRESS NOTES
Occupational Therapy   Occupational Therapy Initial Assessment/Treatment   Date: 2019   Patient Name: Alierza Ramsay  MRN: [id number]     : 1930    Date of Service: 2019    Discharge Recommendations:  [address]  OT Equipment Recommendations  Equipment Needed: No    Assessment   Performance deficits / Impairments: Decreased functional mobility ; Decreased safe awareness;Decreased balance;Decreased ADL status; Decreased cognition;Decreased vision/visual deficit; Decreased ROM; Decreased endurance;Decreased high-level IADLs  Assessment: Pt 81 yo female functioning with deficits in the areas listed above following R hip fx and RIGHT HIP HEMIARTHROPLASTY on . Pt lives at the [facility] but is unable to provide baseline functioning at this time. Pt is limited d/t decreased cognition and increased HR at this time. RN is aware of HR and O2 levels at this time. Pt dependent for bed mobility and sitting balance. Skilled OT services needed to return to baseline functioning. Prognosis: Fair  Decision Making: Medium Complexity  OT Education: OT Role;Plan of Care;Orientation  REQUIRES OT FOLLOW UP: Yes  Activity Tolerance  Activity Tolerance: Patient limited by pain;Treatment limited secondary to decreased cognition;Treatment limited secondary to medical complications (free text)  Activity Tolerance: O2 on 5L lows 90%, HR low 100s at rest, SEATED EOB pt O2 reading between 85%-92% HR 130s-140s  Safety Devices  Safety Devices in place: Yes  Type of devices: Call light within reach; Bed alarm in place; Left in bed;Nurse notified           Patient Diagnosis(es): The primary encounter diagnosis was Closed subcapital fracture of right femur, initial encounter ([facility] Diagnoses of Fall, initial encounter, ARIELA (acute kidney injury) ([facility] Elevated troponin, Leukocytosis, unspecified type, and Hypoxia were also pertinent to this visit.      has a past medical history of CAD (coronary artery disease), COPD (chronic obstructive pulmonary disease) ([facility] Depression, Diabetes mellitus ([facility] GERD (gastroesophageal reflux disease), Hyperlipidemia, Hypertension, Mood disorder ([facility] Osteoarthritis, Osteoporosis, and Spondylolysis. has a past surgical history that includes eye surgery (Bilateral); Blepharoplasty (Bilateral); and HEMIARTHROPLASTY HIP (Right, 9/13/2019). Restrictions  Restrictions/Precautions  Restrictions/Precautions: Weight Bearing, General Precautions, Fall Risk  Lower Extremity Weight Bearing Restrictions  Right Lower Extremity Weight Bearing: Weight Bearing As Tolerated  Position Activity Restriction  Hip Precautions: No ADduction, No hip extension, No hip external rotation  Other position/activity restrictions: bravo jim    Subjective   General  Chart Reviewed: Yes  Patient assessed for rehabilitation services?: Yes  Family / Caregiver Present: No  Referring Practitioner: Bruno Kaur MD  Subjective  Subjective: Pt confused throughout session, agreeable to therapy  General Comment  Comments: RN approved therapy, RN notified in room about -140s throughout session and varying O2 levels   Patient Currently in Pain: Yes(pt reporting some pain in L sided pain but unsure of what part of body)  Pain Assessment  Pain Assessment: Faces  Mccollum-Baker Pain Rating: Hurts little more  Pain Type: Acute pain  Pain Location: Hip  Pain Orientation: Right  Non-Pharmaceutical Pain Intervention(s): Therapeutic presence; Emotional support;Repositioned;Cold applied  Vital Signs  Pulse: 72  BP: 111/75  Level of Consciousness: Alert  Patient Currently in Pain: Yes(pt reporting some pain in L sided pain but unsure of what part of body)  Oxygen Therapy  SpO2: 92 %  Pulse Oximeter Device Mode: Intermittent  Pulse Oximeter Device Location: Finger  O2 Device: Nasal cannula  O2 Flow Rate (L/min): 5 L/min     Social/Functional History  Social/Functional History  Type of Home: Facility([facility]

## 2019-09-15 LAB
ANION GAP SERPL CALCULATED.3IONS-SCNC: 10 MMOL/L (ref 3–16)
BUN BLDV-MCNC: 34 MG/DL (ref 7–20)
CALCIUM SERPL-MCNC: 8.3 MG/DL (ref 8.3–10.6)
CHLORIDE BLD-SCNC: 109 MMOL/L (ref 99–110)
CO2: 25 MMOL/L (ref 21–32)
CREAT SERPL-MCNC: 0.9 MG/DL (ref 0.6–1.2)
GFR AFRICAN AMERICAN: >60
GFR NON-AFRICAN AMERICAN: 59
GLUCOSE BLD-MCNC: 113 MG/DL (ref 70–99)
GLUCOSE BLD-MCNC: 137 MG/DL (ref 70–99)
GLUCOSE BLD-MCNC: 138 MG/DL (ref 70–99)
GLUCOSE BLD-MCNC: 142 MG/DL (ref 70–99)
GLUCOSE BLD-MCNC: 157 MG/DL (ref 70–99)
HCT VFR BLD CALC: 27.2 % (ref 36–48)
HEMOGLOBIN: 9.1 G/DL (ref 12–16)
MCH RBC QN AUTO: 33.9 PG (ref 26–34)
MCHC RBC AUTO-ENTMCNC: 33.4 G/DL (ref 31–36)
MCV RBC AUTO: 101.6 FL (ref 80–100)
PDW BLD-RTO: 13.3 % (ref 12.4–15.4)
PERFORMED ON: ABNORMAL
PLATELET # BLD: 196 K/UL (ref 135–450)
PMV BLD AUTO: 9 FL (ref 5–10.5)
POTASSIUM REFLEX MAGNESIUM: 4.2 MMOL/L (ref 3.5–5.1)
RBC # BLD: 2.67 M/UL (ref 4–5.2)
SODIUM BLD-SCNC: 144 MMOL/L (ref 136–145)
WBC # BLD: 7.3 K/UL (ref 4–11)

## 2019-09-15 PROCEDURE — 2580000003 HC RX 258: Performed by: ORTHOPAEDIC SURGERY

## 2019-09-15 PROCEDURE — 6370000000 HC RX 637 (ALT 250 FOR IP): Performed by: ORTHOPAEDIC SURGERY

## 2019-09-15 PROCEDURE — 1200000000 HC SEMI PRIVATE

## 2019-09-15 PROCEDURE — 36415 COLL VENOUS BLD VENIPUNCTURE: CPT

## 2019-09-15 PROCEDURE — 85027 COMPLETE CBC AUTOMATED: CPT

## 2019-09-15 PROCEDURE — APPSS30 APP SPLIT SHARED TIME 16-30 MINUTES: Performed by: PHYSICIAN ASSISTANT

## 2019-09-15 PROCEDURE — 6360000002 HC RX W HCPCS: Performed by: ORTHOPAEDIC SURGERY

## 2019-09-15 PROCEDURE — 2700000000 HC OXYGEN THERAPY PER DAY

## 2019-09-15 PROCEDURE — 2580000003 HC RX 258

## 2019-09-15 PROCEDURE — 94761 N-INVAS EAR/PLS OXIMETRY MLT: CPT

## 2019-09-15 PROCEDURE — 80048 BASIC METABOLIC PNL TOTAL CA: CPT

## 2019-09-15 RX ORDER — SODIUM CHLORIDE 9 MG/ML
INJECTION, SOLUTION INTRAVENOUS
Status: COMPLETED
Start: 2019-09-15 | End: 2019-09-15

## 2019-09-15 RX ADMIN — CEFTRIAXONE SODIUM 1 G: 1 INJECTION, POWDER, FOR SOLUTION INTRAMUSCULAR; INTRAVENOUS at 20:39

## 2019-09-15 RX ADMIN — ARIPIPRAZOLE 10 MG: 10 TABLET ORAL at 11:13

## 2019-09-15 RX ADMIN — ACETAMINOPHEN 650 MG: 325 TABLET ORAL at 18:23

## 2019-09-15 RX ADMIN — INSULIN LISPRO 1 UNITS: 100 INJECTION, SOLUTION INTRAVENOUS; SUBCUTANEOUS at 18:25

## 2019-09-15 RX ADMIN — OXYCODONE HYDROCHLORIDE 5 MG: 5 TABLET ORAL at 03:21

## 2019-09-15 RX ADMIN — ACETAMINOPHEN 650 MG: 325 TABLET ORAL at 11:11

## 2019-09-15 RX ADMIN — ENOXAPARIN SODIUM 40 MG: 40 INJECTION SUBCUTANEOUS at 11:13

## 2019-09-15 RX ADMIN — CARVEDILOL 3.12 MG: 3.12 TABLET, FILM COATED ORAL at 11:13

## 2019-09-15 RX ADMIN — SODIUM CHLORIDE 250 ML: 9 INJECTION, SOLUTION INTRAVENOUS at 20:39

## 2019-09-15 RX ADMIN — ASPIRIN 81 MG 81 MG: 81 TABLET ORAL at 11:11

## 2019-09-15 RX ADMIN — DIVALPROEX SODIUM 125 MG: 125 CAPSULE, COATED PELLETS ORAL at 20:38

## 2019-09-15 RX ADMIN — Medication 10 ML: at 20:38

## 2019-09-15 RX ADMIN — DOCUSATE SODIUM 100 MG: 100 CAPSULE, LIQUID FILLED ORAL at 20:38

## 2019-09-15 RX ADMIN — Medication 3 MG: at 20:38

## 2019-09-15 RX ADMIN — DIVALPROEX SODIUM 125 MG: 125 CAPSULE, COATED PELLETS ORAL at 11:13

## 2019-09-15 RX ADMIN — Medication 10 ML: at 11:16

## 2019-09-15 RX ADMIN — FERROUS SULFATE TAB 325 MG (65 MG ELEMENTAL FE) 325 MG: 325 (65 FE) TAB at 11:11

## 2019-09-15 RX ADMIN — ATORVASTATIN CALCIUM 10 MG: 10 TABLET, FILM COATED ORAL at 11:12

## 2019-09-15 ASSESSMENT — PAIN DESCRIPTION - PAIN TYPE: TYPE: SURGICAL PAIN

## 2019-09-15 ASSESSMENT — PAIN DESCRIPTION - LOCATION: LOCATION: HIP

## 2019-09-15 ASSESSMENT — PAIN SCALES - GENERAL: PAINLEVEL_OUTOF10: 6

## 2019-09-15 ASSESSMENT — PAIN DESCRIPTION - ORIENTATION: ORIENTATION: RIGHT

## 2019-09-15 NOTE — PROGRESS NOTES
Department of Orthopedic Surgery  Physician Assistant   Progress Note    Subjective:     Post-Operative Day: 2 Status Post right Hip Hemiarthroplasty  Systemic or Specific Complaints:Pain Control    Objective:     Patient Vitals for the past 24 hrs:   BP Temp Temp src Pulse Resp SpO2   09/15/19 1110 136/88 -- -- 114 -- --   09/15/19 1030 119/79 -- -- -- -- --   09/15/19 1017 123/69 -- -- 107 -- --   09/15/19 1015 (!) 127/91 -- -- 104 -- --   09/15/19 1012 -- -- -- 107 -- --   09/15/19 1009 102/69 98.5 °F (36.9 °C) Axillary 100 19 95 %   09/15/19 0930 92/67 -- -- -- -- --   09/15/19 0923 (!) 81/56 -- -- -- -- --   09/15/19 0920 92/61 98.7 °F (37.1 °C) Oral 119 18 93 %   09/15/19 0321 122/81 97.7 °F (36.5 °C) Oral 89 16 92 %   09/14/19 2157 -- -- -- 76 -- --   09/14/19 2148 101/66 98 °F (36.7 °C) Oral 76 18 95 %   09/14/19 1500 106/64 98.6 °F (37 °C) -- 101 -- 92 %       General: alert, appears stated age and cooperative   Wound: Wound clean and dry no evidence of infection. , Wound Intact and Positive for Edema   Motion: Painful range of Motion   DVT Exam: No evidence of DVT seen on physical exam.       NVI in lower extremity. Thigh swollen but soft. Moving foot and ankle. Data Review  CBC:   Lab Results   Component Value Date    WBC 7.3 09/15/2019    RBC 2.67 09/15/2019    HGB 9.1 09/15/2019    HCT 27.2 09/15/2019     09/15/2019       Assessment:     Status Post right Hip Hemiarthroplasty. Doing well postoperatively.      Plan:      1: Continues current post-op course :  2:  Continue Deep venous thrombosis prophylaxis  3:  Continue physical therapy  4:  Continue Pain Control

## 2019-09-16 VITALS
BODY MASS INDEX: 23.9 KG/M2 | OXYGEN SATURATION: 99 % | HEIGHT: 64 IN | RESPIRATION RATE: 16 BRPM | SYSTOLIC BLOOD PRESSURE: 126 MMHG | WEIGHT: 140 LBS | HEART RATE: 84 BPM | TEMPERATURE: 97.6 F | DIASTOLIC BLOOD PRESSURE: 72 MMHG

## 2019-09-16 LAB
ANION GAP SERPL CALCULATED.3IONS-SCNC: 11 MMOL/L (ref 3–16)
BUN BLDV-MCNC: 30 MG/DL (ref 7–20)
CALCIUM SERPL-MCNC: 8.5 MG/DL (ref 8.3–10.6)
CHLORIDE BLD-SCNC: 108 MMOL/L (ref 99–110)
CO2: 25 MMOL/L (ref 21–32)
CREAT SERPL-MCNC: 0.8 MG/DL (ref 0.6–1.2)
GFR AFRICAN AMERICAN: >60
GFR NON-AFRICAN AMERICAN: >60
GLUCOSE BLD-MCNC: 123 MG/DL (ref 70–99)
GLUCOSE BLD-MCNC: 129 MG/DL (ref 70–99)
GLUCOSE BLD-MCNC: 173 MG/DL (ref 70–99)
HCT VFR BLD CALC: 27.9 % (ref 36–48)
HEMOGLOBIN: 9.4 G/DL (ref 12–16)
MCH RBC QN AUTO: 33.9 PG (ref 26–34)
MCHC RBC AUTO-ENTMCNC: 33.9 G/DL (ref 31–36)
MCV RBC AUTO: 99.9 FL (ref 80–100)
PDW BLD-RTO: 13.1 % (ref 12.4–15.4)
PERFORMED ON: ABNORMAL
PERFORMED ON: ABNORMAL
PLATELET # BLD: 219 K/UL (ref 135–450)
PMV BLD AUTO: 9.1 FL (ref 5–10.5)
POTASSIUM REFLEX MAGNESIUM: 3.9 MMOL/L (ref 3.5–5.1)
RBC # BLD: 2.79 M/UL (ref 4–5.2)
SODIUM BLD-SCNC: 144 MMOL/L (ref 136–145)
WBC # BLD: 8.8 K/UL (ref 4–11)

## 2019-09-16 PROCEDURE — 85027 COMPLETE CBC AUTOMATED: CPT

## 2019-09-16 PROCEDURE — 2700000000 HC OXYGEN THERAPY PER DAY

## 2019-09-16 PROCEDURE — 6370000000 HC RX 637 (ALT 250 FOR IP): Performed by: ORTHOPAEDIC SURGERY

## 2019-09-16 PROCEDURE — 2580000003 HC RX 258: Performed by: ORTHOPAEDIC SURGERY

## 2019-09-16 PROCEDURE — 36415 COLL VENOUS BLD VENIPUNCTURE: CPT

## 2019-09-16 PROCEDURE — 97530 THERAPEUTIC ACTIVITIES: CPT

## 2019-09-16 PROCEDURE — 80048 BASIC METABOLIC PNL TOTAL CA: CPT

## 2019-09-16 PROCEDURE — 92526 ORAL FUNCTION THERAPY: CPT

## 2019-09-16 PROCEDURE — 6360000002 HC RX W HCPCS: Performed by: ORTHOPAEDIC SURGERY

## 2019-09-16 PROCEDURE — 6370000000 HC RX 637 (ALT 250 FOR IP): Performed by: PHYSICIAN ASSISTANT

## 2019-09-16 PROCEDURE — 6360000002 HC RX W HCPCS: Performed by: PHYSICIAN ASSISTANT

## 2019-09-16 PROCEDURE — 94761 N-INVAS EAR/PLS OXIMETRY MLT: CPT

## 2019-09-16 PROCEDURE — APPSS30 APP SPLIT SHARED TIME 16-30 MINUTES: Performed by: PHYSICIAN ASSISTANT

## 2019-09-16 PROCEDURE — 97535 SELF CARE MNGMENT TRAINING: CPT

## 2019-09-16 RX ORDER — FAMOTIDINE 20 MG/1
20 TABLET, FILM COATED ORAL DAILY PRN
Qty: 60 TABLET | Refills: 3 | Status: SHIPPED | OUTPATIENT
Start: 2019-09-16

## 2019-09-16 RX ORDER — ACETAMINOPHEN 650 MG/1
650 SUPPOSITORY RECTAL
Status: DISCONTINUED | OUTPATIENT
Start: 2019-09-16 | End: 2019-09-16

## 2019-09-16 RX ORDER — ACETAMINOPHEN 650 MG/1
650 SUPPOSITORY RECTAL EVERY 4 HOURS PRN
Status: DISCONTINUED | OUTPATIENT
Start: 2019-09-16 | End: 2019-09-16 | Stop reason: SDUPTHER

## 2019-09-16 RX ORDER — KETOROLAC TROMETHAMINE 30 MG/ML
15 INJECTION, SOLUTION INTRAMUSCULAR; INTRAVENOUS EVERY 8 HOURS PRN
Status: DISCONTINUED | OUTPATIENT
Start: 2019-09-16 | End: 2019-09-16 | Stop reason: HOSPADM

## 2019-09-16 RX ORDER — OXYCODONE HCL 5 MG/5 ML
2.5 SOLUTION, ORAL ORAL EVERY 4 HOURS PRN
Status: DISCONTINUED | OUTPATIENT
Start: 2019-09-16 | End: 2019-09-16 | Stop reason: HOSPADM

## 2019-09-16 RX ORDER — ACETAMINOPHEN 650 MG/1
650 SUPPOSITORY RECTAL EVERY 4 HOURS PRN
Status: DISCONTINUED | OUTPATIENT
Start: 2019-09-16 | End: 2019-09-16

## 2019-09-16 RX ORDER — ACETAMINOPHEN 650 MG/1
650 SUPPOSITORY RECTAL EVERY 4 HOURS PRN
Status: DISCONTINUED | OUTPATIENT
Start: 2019-09-16 | End: 2019-09-16 | Stop reason: HOSPADM

## 2019-09-16 RX ADMIN — KETOROLAC TROMETHAMINE 15 MG: 30 INJECTION, SOLUTION INTRAMUSCULAR at 04:55

## 2019-09-16 RX ADMIN — ACETAMINOPHEN 650 MG: 650 SUPPOSITORY RECTAL at 04:56

## 2019-09-16 RX ADMIN — DIVALPROEX SODIUM 125 MG: 125 CAPSULE, COATED PELLETS ORAL at 10:11

## 2019-09-16 RX ADMIN — CARVEDILOL 3.12 MG: 3.12 TABLET, FILM COATED ORAL at 10:02

## 2019-09-16 RX ADMIN — Medication 10 ML: at 10:00

## 2019-09-16 RX ADMIN — Medication 2.5 MG: at 11:34

## 2019-09-16 RX ADMIN — ASPIRIN 81 MG 81 MG: 81 TABLET ORAL at 10:02

## 2019-09-16 RX ADMIN — ENOXAPARIN SODIUM 40 MG: 40 INJECTION SUBCUTANEOUS at 10:00

## 2019-09-16 RX ADMIN — FERROUS SULFATE TAB 325 MG (65 MG ELEMENTAL FE) 325 MG: 325 (65 FE) TAB at 10:02

## 2019-09-16 RX ADMIN — ARIPIPRAZOLE 10 MG: 10 TABLET ORAL at 10:02

## 2019-09-16 RX ADMIN — ATORVASTATIN CALCIUM 10 MG: 10 TABLET, FILM COATED ORAL at 10:02

## 2019-09-16 ASSESSMENT — PAIN SCALES - GENERAL
PAINLEVEL_OUTOF10: 10
PAINLEVEL_OUTOF10: 2
PAINLEVEL_OUTOF10: 2

## 2019-09-16 NOTE — DISCHARGE SUMMARY
weeks    Code Status:  Full Code     Activity: activity as tolerated    Diet: cardiac diet and diabetic diet      Discharge Medications:     Current Discharge Medication List           Details   famotidine (PEPCID) 20 MG tablet Take 1 tablet by mouth daily as needed (for GERD or Indigestion.)  Qty: 60 tablet, Refills: 3              Details   acetaminophen (TYLENOL) 325 MG tablet Take 650 mg by mouth every 8 hours as needed for Pain      ARIPiprazole (ABILIFY) 10 MG tablet Take 10 mg by mouth daily      atorvastatin (LIPITOR) 10 MG tablet Take 10 mg by mouth daily      divalproex (DEPAKOTE) 125 MG DR tablet Take 125 mg by mouth 2 times daily      loratadine (CLARITIN) 10 MG capsule Take 10 mg by mouth daily      apixaban (ELIQUIS) 2.5 MG TABS tablet Take 1 tablet by mouth 2 times daily  Qty: 60 tablet, Refills: 0      furosemide (LASIX) 40 MG tablet Take 1 tablet by mouth daily  Qty: 60 tablet, Refills: 3      potassium chloride (KLOR-CON M) 10 MEQ extended release tablet Take 1 tablet by mouth daily  Qty: 30 tablet, Refills: 0      ipratropium-albuterol (DUONEB) 0.5-2.5 (3) MG/3ML SOLN nebulizer solution Inhale 3 mLs into the lungs every 4 hours  Qty: 360 mL, Refills: 0      carvedilol (COREG) 3.125 MG tablet Take 1 tablet by mouth 2 times daily (with meals)  Qty: 60 tablet, Refills: 0      aspirin 81 MG tablet Take 81 mg by mouth daily      calcium citrate-vitamin D (CITRICAL + D) 315-250 MG-UNIT TABS per tablet Take 1 tablet by mouth daily (with breakfast)      docusate sodium (COLACE) 100 MG capsule Take 100 mg by mouth daily      ferrous sulfate 325 (65 Fe) MG tablet Take 325 mg by mouth daily (with breakfast)      fluticasone (FLONASE) 50 MCG/ACT nasal spray 1 spray by Each Nare route daily as needed for Rhinitis      lactulose (CHRONULAC) 10 GM/15ML solution Take 20 g by mouth daily      melatonin 3 MG TABS tablet Take 3 mg by mouth daily Give 1 and 1/2 tablet      polyethylene glycol (GLYCOLAX) powder Take

## 2019-09-26 ENCOUNTER — CARE COORDINATION (OUTPATIENT)
Dept: CASE MANAGEMENT | Age: 84
End: 2019-09-26

## 2019-10-10 ENCOUNTER — CARE COORDINATION (OUTPATIENT)
Dept: CASE MANAGEMENT | Age: 84
End: 2019-10-10

## 2019-10-11 PROBLEM — R77.8 ELEVATED TROPONIN: Status: RESOLVED | Noted: 2019-09-11 | Resolved: 2019-10-11

## 2020-02-12 NOTE — DISCHARGE SUMMARY
Hospital Medicine Discharge Summary    Patient ID: Lyly Ansari      Patient's PCP: Patti Reaves    Admit Date: 6/3/2019     Discharge Date: 6/7/2019      Admitting Physician: Shaji Santillan MD     Discharge Physician: SOFIA Torres - CNP     Discharge Diagnoses: Active Hospital Problems    Diagnosis    Acute diastolic CHF (congestive heart failure) ([facility]) [I50.31]    A-fib ([facility]) [I48.91]    Dementia [F03.90]    CHF (congestive heart failure), NYHA class I, acute on chronic, combined ([facility] [address] [I50.43]    Acute respiratory failure with hypoxia ([facility] [address] [J96.01]    Depression [F32.9]       The patient was seen and examined on day of discharge and this discharge summary is in conjunction with any daily progress note from day of discharge. Hospital Course: 80 y. o. female who presented to [facility] with shortness of breath. She is a poor historian. PMHx sig for blindness, COPD, HTN, CAD.     She comes from [facility]. She was sent for acute dyspnea, but she says she has been short of breath for months and her son says she has been short of breath for a week. She endorses a cough, chest pain, abdominal pain, and nausea, but no vomiting.     She fractured her patella earlier this spring and has been in a wheelchair for several weeks.      In the ED, she was reported to be hypoxic on admission, and was placed on supplemental O2. CT PE was negative for PE and negative for pneumonia, but suggested possible bronchitis or an aspiration event.             Physical Exam Performed:     /84   Pulse 86   Temp 98.8 °F (37.1 °C) (Axillary)   Resp 16   Ht 5' 4\" (1.626 m)   Wt 135 lb 5.8 oz (61.4 kg)   SpO2 96%   BMI 23.23 kg/m²     General appearance: Elderly female in no apparent distress, appears stated age and cooperative. HEENT: Pupils equal, round, and reactive to light. Conjunctivae/corneas clear. Neck: Supple, with full range of motion. No jugular venous distention.  Trachea neededHistorical Med      nicotine polacrilex (NICORETTE) 2 MG lozenge Take 2 mg by mouth as needed for Smoking cessationHistorical Med      desvenlafaxine succinate (PRISTIQ) 100 MG TB24 extended release tablet Take by mouth every other dayHistorical Med      rosuvastatin (CRESTOR) 5 MG tablet Take 5 mg by mouth nightlyHistorical Med             Time Spent on discharge is more than 45 minutes in the examination, evaluation, counseling and review of medications and discharge plan. Signed:    SOFIA May - CNP   6/8/2019      Thank you Tramaine Jeter for the opportunity to be involved in this patient's care. If you have any questions or concerns please feel free to contact me at [phone]. Yes

## 2020-06-10 NOTE — PROGRESS NOTES
Hospitalist Progress Note      PCP: King Graves    Date of Admission: 6/3/2019    Chief Complaint: SOB    Hospital Course:  80 y.o. female who presented to [facility] with shortness of breath. She is a poor historian. PMHx sig for blindness, COPD, HTN, CAD.     She comes from [facility]. She was sent for acute dyspnea, but she says she has been short of breath for months and her son says she has been short of breath for a week. She endorses a cough, chest pain, abdominal pain, and nausea, but no vomiting.     She fractured her patella earlier this spring and has been in a wheelchair for several weeks.      In the ED, she was reported to be hypoxic on admission, and was placed on supplemental O2. CT PE was negative for PE and negative for pneumonia, but suggested possible bronchitis or an aspiration event.      Subjective:  Pt confused. Yelling out for me to leave her alone. Does not answer questions, refuses  EMR reviewed. Medications:  Reviewed    Infusion Medications   Scheduled Medications    furosemide  40 mg Oral Daily    QUEtiapine  25 mg Oral Nightly    desvenlafaxine succinate  100 mg Oral Every Other Day    melatonin  5 mg Oral Nightly    apixaban  2.5 mg Oral BID    aspirin  81 mg Oral Daily    carvedilol  3.125 mg Oral BID WC    sodium chloride flush  10 mL Intravenous 2 times per day    ipratropium-albuterol  3 mL Inhalation Q4H WA     PRN Meds: melatonin, sodium chloride flush, magnesium hydroxide, ondansetron, acetaminophen      Intake/Output Summary (Last 24 hours) at 6/6/2019 1137  Last data filed at 6/6/2019 1116  Gross per 24 hour   Intake 160 ml   Output 1625 ml   Net -1465 ml       Physical Exam Performed:    /85   Pulse 76   Temp 98.3 °F (36.8 °C) (Axillary)   Resp 12   Ht 5' 4\" (1.626 m)   Wt 140 lb 14 oz (63.9 kg)   SpO2 97%   BMI 24.18 kg/m²     General appearance: Elderly female in no apparent distress, appears stated age and cooperative.   HEENT: Pt to return call.   Pupils equal, round, and reactive to light. Conjunctivae/corneas clear. Neck: Supple, with full range of motion. No jugular venous distention. Trachea midline. Respiratory:  Normal respiratory effort. Clear to auscultation, bilaterally without Rales/Wheezes/Rhonchi. Cardiovascular: Irregularly irregular with normal S1/S2 without murmurs, rubs or gallops. Abdomen: Soft, non-tender, non-distended with normal bowel sounds. Musculoskeletal: No clubbing, cyanosis or edema bilaterally. Full range of motion without deformity. Skin: Skin color, texture, turgor normal.  No rashes or lesions. Neurologic:  Neurovascularly intact without any focal sensory/motor deficits. Cranial nerves: II-XII intact, grossly non-focal.  Psychiatric: Alert and oriented to person only. Limited insight. Capillary Refill: Brisk,< 3 seconds   Peripheral Pulses: +2 palpable, equal bilaterally       Labs:   Recent Labs     06/03/19  1750   WBC 9.6   HGB 11.1*   HCT 33.1*        Recent Labs     06/04/19  0429 06/05/19  0425 06/06/19  0425    143 141   K 3.9 3.6 3.5   CL 99 100 100   CO2 26 30 29   BUN 29* 34* 27*   CREATININE 0.8 1.0 0.8   CALCIUM 8.7 9.0 9.0     Recent Labs     06/03/19  1750   AST 17   ALT 15   BILITOT 0.3   ALKPHOS 77     No results for input(s): INR in the last 72 hours. Recent Labs     06/03/19  1750   TROPONINI <0.01       Urinalysis:      Lab Results   Component Value Date    NITRU Negative 06/03/2019    WBCUA 6-10 11/30/2018    BACTERIA 4+ 11/30/2018    RBCUA 3-5 11/30/2018    BLOODU Negative 06/03/2019    SPECGRAV <=1.005 06/03/2019    GLUCOSEU Negative 06/03/2019       Radiology:  CT CHEST PULMONARY EMBOLISM W CONTRAST   Final Result   1. Negative CT angiogram for acute pulmonary embolism. 2. Bronchial wall thickening within the right lower lobe bronchi which could   indicate a bronchitis or sequelae of aspiration. There is however no   pneumonia evident.    3. Small bilateral pleural effusions and atelectasis within the lower lobes. 4. Age indeterminate but likely late subacute/early chronic T7 compression   fracture. XR CHEST PORTABLE   Final Result   Findings suggest congestive heart failure                 Assessment/Plan:    Active Hospital Problems    Diagnosis    Acute diastolic CHF (congestive heart failure) ([facility]) [I50.31]    A-fib ([facility]) [I48.91]    Dementia [F03.90]    CHF (congestive heart failure), NYHA class I, acute on chronic, combined ([facility]) [I50.43]    Acute respiratory failure with hypoxia ([facility]) [J96.01]    Depression [F32.9]     Acute on chronic dCHF  -initially on Lasix 40 mg IV BID. Now transitioned to 40 mg PO daily  -Ins and Out, daily weights  -ECHO - EF 55-60% with elevated left ventricular diastolic filling pressure.  -continue ASA, BB.   -follow BMP    New onset Afib, rate controlled. -continue BB.  -[id number] Score for Atrial Fibrillation Stroke Risk   Risk   Factors  Component Value   C CHF Yes 1   H HTN Yes 1   A2 Age >= 76 Yes,  (80 y.o.) 2   D DM Yes 1   S2 Prior Stroke/TIA No 0   V Vascular Disease No 0   A Age 74-69 No,  (80 y.o.) 0   Sc Sex female 1    [id number]  Score  6       -Eliquis 2.5 mg BID started 6/5 (dose reduced due to age and weight). -consult cardiology. Possible aspiration, no evidence of PNA  -SLP evaluation completed. Acute hypoxic respiratory failure   - likely due to CHF. - supplemental O2 to keep sats > 92%. Wean as tolerated. Metabolic encephalopathy - multifactorial.  -underlying dementia. -supportive care. Add Seroquel 25 mg nightly to see if helps with night time confusion/agitation.     COPD without exacerbation  - continue duonebs      HTN with hx of CAD  -BP soft on admission  -hold home amlodipine for now.  -continue Lasix and BB.     Depression  -continue home Pristiq       DVT Prophylaxis: Eliquis  Diet: DIET DENTAL SOFT;  Dietary Nutrition Supplements: Frozen Oral Supplement, Standard High Calorie Oral

## 2024-04-26 NOTE — PROGRESS NOTES
Speech Language Pathology  Speech note: She had recently been moved from chair to bed, reports feeling tired and declined tx at 915 am. Positive reinforcement and verbal encouragement given. No charges. Joaquina Peñaloza CCC-SLP. D [employer]  7-12-19 [facility] has the 2.4 in stock

## (undated) DEVICE — FEMORAL CANAL TIP, IRRIGATION/SUCTION

## (undated) DEVICE — STERILE LATEX POWDER-FREE SURGICAL GLOVESWITH NITRILE AND EMOLLIENT COATINGS: Brand: PROTEXIS

## (undated) DEVICE — GAUZE,SPONGE,4"X4",8PLY,STRL,LF,10/TRAY: Brand: MEDLINE

## (undated) DEVICE — BOWL AND CEMENT CARTRIDGE WITH BREAKAWAY FEMORAL NOZZLE AND MEDIUM PRESSURIZER: Brand: ACM

## (undated) DEVICE — SOLUTION IV IRRIG POUR BRL 0.9% SODIUM CHL 2F7124

## (undated) DEVICE — Device

## (undated) DEVICE — BANDAGE GZ W6INXL2YD POLY STRTCH CNFRM CVR RL

## (undated) DEVICE — SUTURE PDS II SZ 0 L18IN ABSRB VLT L36MM CT-1 1/2 CIR Z740D

## (undated) DEVICE — GOWN SIRUS NONREIN LG W/TWL: Brand: MEDLINE INDUSTRIES, INC.